# Patient Record
Sex: FEMALE | Race: WHITE | NOT HISPANIC OR LATINO | Employment: OTHER | URBAN - NONMETROPOLITAN AREA
[De-identification: names, ages, dates, MRNs, and addresses within clinical notes are randomized per-mention and may not be internally consistent; named-entity substitution may affect disease eponyms.]

---

## 2017-01-03 RX ORDER — NEBIVOLOL HYDROCHLORIDE 20 MG/1
TABLET ORAL
Qty: 90 TABLET | Refills: 1 | Status: SHIPPED | OUTPATIENT
Start: 2017-01-03 | End: 2017-05-11 | Stop reason: SDUPTHER

## 2017-01-03 RX ORDER — GLIPIZIDE 2.5 MG/1
TABLET, EXTENDED RELEASE ORAL
Qty: 90 TABLET | Refills: 1 | Status: SHIPPED | OUTPATIENT
Start: 2017-01-03 | End: 2017-05-11 | Stop reason: SDUPTHER

## 2017-01-09 RX ORDER — HYDROCHLOROTHIAZIDE 25 MG/1
25 TABLET ORAL DAILY
Qty: 90 TABLET | Refills: 1 | Status: SHIPPED | OUTPATIENT
Start: 2017-01-09 | End: 2017-05-11 | Stop reason: SDUPTHER

## 2017-01-09 RX ORDER — LOSARTAN POTASSIUM 100 MG/1
100 TABLET ORAL DAILY
Qty: 90 TABLET | Refills: 1 | Status: SHIPPED | OUTPATIENT
Start: 2017-01-09 | End: 2017-05-15 | Stop reason: SDUPTHER

## 2017-05-03 ENCOUNTER — OFFICE VISIT (OUTPATIENT)
Dept: GASTROENTEROLOGY | Facility: CLINIC | Age: 72
End: 2017-05-03

## 2017-05-03 VITALS
WEIGHT: 216 LBS | SYSTOLIC BLOOD PRESSURE: 122 MMHG | BODY MASS INDEX: 40.78 KG/M2 | HEART RATE: 88 BPM | DIASTOLIC BLOOD PRESSURE: 72 MMHG | OXYGEN SATURATION: 94 % | HEIGHT: 61 IN

## 2017-05-03 DIAGNOSIS — Z86.010 HX OF COLONIC POLYPS: Primary | ICD-10-CM

## 2017-05-03 DIAGNOSIS — E11.9 TYPE 2 DIABETES MELLITUS WITHOUT COMPLICATION, WITHOUT LONG-TERM CURRENT USE OF INSULIN (HCC): ICD-10-CM

## 2017-05-03 DIAGNOSIS — I10 ESSENTIAL HYPERTENSION: ICD-10-CM

## 2017-05-03 PROCEDURE — S0260 H&P FOR SURGERY: HCPCS | Performed by: CLINICAL NURSE SPECIALIST

## 2017-05-11 RX ORDER — GLIPIZIDE 2.5 MG/1
TABLET, EXTENDED RELEASE ORAL
Qty: 90 TABLET | Refills: 1 | Status: SHIPPED | OUTPATIENT
Start: 2017-05-11 | End: 2017-05-15 | Stop reason: SDUPTHER

## 2017-05-11 RX ORDER — NEBIVOLOL HYDROCHLORIDE 20 MG/1
TABLET ORAL
Qty: 90 TABLET | Refills: 1 | Status: SHIPPED | OUTPATIENT
Start: 2017-05-11 | End: 2017-05-15 | Stop reason: SDUPTHER

## 2017-05-11 RX ORDER — HYDROCHLOROTHIAZIDE 25 MG/1
TABLET ORAL
Qty: 90 TABLET | Refills: 1 | Status: SHIPPED | OUTPATIENT
Start: 2017-05-11 | End: 2017-05-15 | Stop reason: SDUPTHER

## 2017-05-15 ENCOUNTER — OFFICE VISIT (OUTPATIENT)
Dept: FAMILY MEDICINE CLINIC | Facility: CLINIC | Age: 72
End: 2017-05-15

## 2017-05-15 VITALS
WEIGHT: 218 LBS | HEIGHT: 61 IN | HEART RATE: 68 BPM | SYSTOLIC BLOOD PRESSURE: 150 MMHG | BODY MASS INDEX: 41.16 KG/M2 | RESPIRATION RATE: 16 BRPM | OXYGEN SATURATION: 97 % | DIASTOLIC BLOOD PRESSURE: 90 MMHG

## 2017-05-15 DIAGNOSIS — E03.9 ACQUIRED HYPOTHYROIDISM: ICD-10-CM

## 2017-05-15 DIAGNOSIS — E11.9 TYPE 2 DIABETES MELLITUS WITHOUT COMPLICATION, WITHOUT LONG-TERM CURRENT USE OF INSULIN (HCC): ICD-10-CM

## 2017-05-15 DIAGNOSIS — E78.2 MIXED HYPERLIPIDEMIA: ICD-10-CM

## 2017-05-15 DIAGNOSIS — N18.30 CKD (CHRONIC KIDNEY DISEASE) STAGE 3, GFR 30-59 ML/MIN (HCC): ICD-10-CM

## 2017-05-15 DIAGNOSIS — I10 ESSENTIAL HYPERTENSION: Primary | ICD-10-CM

## 2017-05-15 PROCEDURE — 99214 OFFICE O/P EST MOD 30 MIN: CPT | Performed by: FAMILY MEDICINE

## 2017-05-15 RX ORDER — HYDROCHLOROTHIAZIDE 25 MG/1
25 TABLET ORAL DAILY
Qty: 90 TABLET | Refills: 4 | Status: SHIPPED | OUTPATIENT
Start: 2017-05-15 | End: 2018-05-12 | Stop reason: SDUPTHER

## 2017-05-15 RX ORDER — LOSARTAN POTASSIUM 100 MG/1
100 TABLET ORAL DAILY
Qty: 90 TABLET | Refills: 4 | Status: SHIPPED | OUTPATIENT
Start: 2017-05-15 | End: 2018-05-12 | Stop reason: SDUPTHER

## 2017-05-15 RX ORDER — GLIPIZIDE 2.5 MG/1
2.5 TABLET, EXTENDED RELEASE ORAL DAILY
Qty: 90 TABLET | Refills: 4 | Status: SHIPPED | OUTPATIENT
Start: 2017-05-15 | End: 2018-05-12 | Stop reason: SDUPTHER

## 2017-05-15 RX ORDER — NEBIVOLOL 20 MG/1
20 TABLET ORAL DAILY
Qty: 90 TABLET | Refills: 4 | Status: SHIPPED | OUTPATIENT
Start: 2017-05-15 | End: 2018-05-12 | Stop reason: SDUPTHER

## 2017-05-15 RX ORDER — POLYETHYLENE GLYCOL 3350, SODIUM CHLORIDE, POTASSIUM CHLORIDE, SODIUM BICARBONATE, AND SODIUM SULFATE 240; 5.84; 2.98; 6.72; 22.72 G/4L; G/4L; G/4L; G/4L; G/4L
POWDER, FOR SOLUTION ORAL
COMMUNITY
Start: 2017-05-03 | End: 2017-11-13

## 2017-05-15 RX ORDER — LEVOTHYROXINE SODIUM 0.1 MG/1
100 TABLET ORAL DAILY
Qty: 90 TABLET | Refills: 4 | Status: SHIPPED | OUTPATIENT
Start: 2017-05-15 | End: 2018-06-07 | Stop reason: SDUPTHER

## 2017-05-16 LAB
CHOLEST SERPL-MCNC: 213 MG/DL (ref 130–200)
HBA1C MFR BLD: 5.3 %
HDLC SERPL-MCNC: 41 MG/DL
LDLC SERPL CALC-MCNC: 137 MG/DL (ref 0–99)
TRIGL SERPL-MCNC: 176 MG/DL (ref 0–149)
VLDLC SERPL CALC-MCNC: 35.2 MG/DL

## 2017-05-18 DIAGNOSIS — E78.5 HYPERLIPIDEMIA, UNSPECIFIED HYPERLIPIDEMIA TYPE: Primary | ICD-10-CM

## 2017-05-18 RX ORDER — ATORVASTATIN CALCIUM 10 MG/1
10 TABLET, FILM COATED ORAL DAILY
Qty: 30 TABLET | Refills: 4 | Status: SHIPPED | OUTPATIENT
Start: 2017-05-18 | End: 2017-08-16 | Stop reason: SDUPTHER

## 2017-07-31 ENCOUNTER — OUTSIDE FACILITY SERVICE (OUTPATIENT)
Dept: GASTROENTEROLOGY | Facility: CLINIC | Age: 72
End: 2017-07-31

## 2017-07-31 PROCEDURE — 45385 COLONOSCOPY W/LESION REMOVAL: CPT | Performed by: INTERNAL MEDICINE

## 2017-07-31 PROCEDURE — 88305 TISSUE EXAM BY PATHOLOGIST: CPT | Performed by: INTERNAL MEDICINE

## 2017-08-01 ENCOUNTER — LAB REQUISITION (OUTPATIENT)
Dept: LAB | Facility: HOSPITAL | Age: 72
End: 2017-08-01

## 2017-08-01 DIAGNOSIS — Z00.00 ENCOUNTER FOR GENERAL ADULT MEDICAL EXAMINATION WITHOUT ABNORMAL FINDINGS: ICD-10-CM

## 2017-08-02 LAB
CYTO UR: NORMAL
LAB AP CASE REPORT: NORMAL
LAB AP CLINICAL INFORMATION: NORMAL
Lab: NORMAL
PATH REPORT.FINAL DX SPEC: NORMAL
PATH REPORT.GROSS SPEC: NORMAL

## 2017-08-16 DIAGNOSIS — E78.5 HYPERLIPIDEMIA, UNSPECIFIED HYPERLIPIDEMIA TYPE: ICD-10-CM

## 2017-08-16 RX ORDER — ATORVASTATIN CALCIUM 10 MG/1
10 TABLET, FILM COATED ORAL DAILY
Qty: 90 TABLET | Refills: 1 | Status: SHIPPED | OUTPATIENT
Start: 2017-08-16 | End: 2017-12-18 | Stop reason: SDUPTHER

## 2017-09-28 ENCOUNTER — RESULTS ENCOUNTER (OUTPATIENT)
Dept: FAMILY MEDICINE CLINIC | Facility: CLINIC | Age: 72
End: 2017-09-28

## 2017-09-28 DIAGNOSIS — E78.5 HYPERLIPIDEMIA, UNSPECIFIED HYPERLIPIDEMIA TYPE: ICD-10-CM

## 2017-11-13 ENCOUNTER — OFFICE VISIT (OUTPATIENT)
Dept: FAMILY MEDICINE CLINIC | Facility: CLINIC | Age: 72
End: 2017-11-13

## 2017-11-13 VITALS
HEART RATE: 74 BPM | DIASTOLIC BLOOD PRESSURE: 80 MMHG | BODY MASS INDEX: 42.1 KG/M2 | SYSTOLIC BLOOD PRESSURE: 132 MMHG | HEIGHT: 61 IN | WEIGHT: 223 LBS | RESPIRATION RATE: 16 BRPM | OXYGEN SATURATION: 93 %

## 2017-11-13 DIAGNOSIS — E03.9 ACQUIRED HYPOTHYROIDISM: ICD-10-CM

## 2017-11-13 DIAGNOSIS — E78.2 MIXED HYPERLIPIDEMIA: ICD-10-CM

## 2017-11-13 DIAGNOSIS — E11.9 TYPE 2 DIABETES MELLITUS WITHOUT COMPLICATION, WITHOUT LONG-TERM CURRENT USE OF INSULIN (HCC): ICD-10-CM

## 2017-11-13 DIAGNOSIS — N18.30 CKD (CHRONIC KIDNEY DISEASE) STAGE 3, GFR 30-59 ML/MIN (HCC): ICD-10-CM

## 2017-11-13 DIAGNOSIS — I10 ESSENTIAL HYPERTENSION: Primary | ICD-10-CM

## 2017-11-13 DIAGNOSIS — Z23 NEED FOR VACCINATION: ICD-10-CM

## 2017-11-13 PROCEDURE — 99214 OFFICE O/P EST MOD 30 MIN: CPT | Performed by: FAMILY MEDICINE

## 2017-11-13 PROCEDURE — G0008 ADMIN INFLUENZA VIRUS VAC: HCPCS | Performed by: FAMILY MEDICINE

## 2017-11-13 PROCEDURE — 90686 IIV4 VACC NO PRSV 0.5 ML IM: CPT | Performed by: FAMILY MEDICINE

## 2017-11-13 NOTE — PROGRESS NOTES
Subjective   Cee Quiles is a 72 y.o. female.     Chief Complaint   Patient presents with   • Follow-up     6 mo       History of Present Illness     Cee is feeling good--denies any hypoglycemic [id number] lipid tx witout myalgia s--sheis toelraign synthroid wituotu heat or cold intolerances--bp is stable wituotu cp or ha  She continues to follow with [id number] for ckd    Current Outpatient Prescriptions:   •  atorvastatin (LIPITOR) 10 MG tablet, Take 1 tablet by mouth Daily., Disp: 90 tablet, Rfl: 1  •  glipiZIDE (GLUCOTROL) 2.5 MG 24 hr tablet, Take 1 tablet by mouth Daily., Disp: 90 tablet, Rfl: 4  •  hydrochlorothiazide (HYDRODIURIL) 25 MG tablet, Take 1 tablet by mouth Daily., Disp: 90 tablet, Rfl: 4  •  levothyroxine (SYNTHROID) 100 MCG tablet, Take 1 tablet by mouth Daily., Disp: 90 tablet, Rfl: 4  •  losartan (COZAAR) 100 MG tablet, Take 1 tablet by mouth Daily., Disp: 90 tablet, Rfl: 4  •  nebivolol (BYSTOLIC) 20 MG tablet, Take 1 tablet by mouth Daily., Disp: 90 tablet, Rfl: 4  Allergies   Allergen Reactions   • Keflex [Cephalexin] Anaphylaxis   • Lortab [Hydrocodone-Acetaminophen] Nausea Only       Past Medical History:   Diagnosis Date   • Arthritis    • Diabetes    • Diabetes mellitus    • Hx of colonic polyps    • Hyperlipidemia    • Hypertension    • Hypertension    • Hypothyroid    • Kidney disease      Past Surgical History:   Procedure Laterality Date   • CHOLECYSTECTOMY     • COLONOSCOPY W/ POLYPECTOMY  02/08/2012    Pedunculated polyp cecal pit, Hyperplastic polyp at 15 cm repeat exam in 5 years   • KNEE SURGERY     • OVARIAN CYST DRAINAGE         Review of Systems   Constitutional: Negative.    HENT: Negative.    Eyes: Negative.    Respiratory: Negative.    Cardiovascular: Negative.    Gastrointestinal: Negative.    Endocrine: Negative.    Genitourinary: Negative.    Musculoskeletal: Negative.    Skin: Negative.    Allergic/Immunologic: Negative.    Neurological: Negative.   "  Hematological: Negative.    Psychiatric/Behavioral: Negative.        Objective  /80  Pulse 74  Resp 16  Ht 61\" (154.9 cm)  Wt 223 lb (101 kg)  SpO2 93%  BMI 42.14 kg/m2  Physical Exam   Constitutional: She is oriented to person, place, and time. She appears well-developed and well-nourished.   HENT:   Head: Normocephalic and atraumatic.   Right Ear: External ear normal.   Left Ear: External ear normal.   Nose: Nose normal.   Mouth/Throat: Oropharynx is clear and moist.   Eyes: Conjunctivae and EOM are normal. Pupils are equal, round, and reactive to light.   Neck: Normal range of motion. Neck supple.   Cardiovascular: Normal rate, regular rhythm, normal heart sounds and intact distal pulses.    Pulmonary/Chest: Effort normal and breath sounds normal.   Abdominal: Soft. Bowel sounds are normal.   Musculoskeletal: Normal range of motion.    Cee had a diabetic foot exam performed today.    Neurological Sensory Findings - Unaltered hot/cold right ankle/foot discrimination and unaltered hot/cold left ankle/foot discrimination. Unaltered sharp/dull right ankle/foot discrimination and unaltered sharp/dull left ankle/foot discrimination. No right ankle/foot altered proprioception and no left ankle/foot altered proprioception    Vascular Status -  Her exam exhibits right foot vasculature normal. Her exam exhibits no right foot edema. Her exam exhibits left foot vasculature normal. Her exam exhibits no left foot edema.   Skin Integrity  -  Her right foot skin is intact.     Cee 's left foot skin is intact. .  Neurological: She is alert and oriented to person, place, and time. She has normal reflexes.   Skin: Skin is warm and dry.   Psychiatric: She has a normal mood and affect. Her behavior is normal. Judgment and thought content normal.   Nursing note and vitals reviewed.      Assessment/Plan   Cee was seen today for follow-up.    Diagnoses and all orders for this visit:    Essential hypertension  -    "  CBC w AUTO Differential  -     Comprehensive Metabolic Panel    Mixed hyperlipidemia  -     Lipid panel    Type 2 diabetes mellitus without complication, without long-term current use of insulin  -     Hemoglobin A1c  -     MicroAlbumin, Urine, Random - Urine, Clean Catch    Acquired hypothyroidism  -     TSH    CKD (chronic kidney disease) stage 3, GFR 30-59 ml/min    flu shot today  She says her mammo is up to date  She says her colonoscopy is up to date         Orders Placed This Encounter   Procedures   • Comprehensive Metabolic Panel   • Lipid panel   • Hemoglobin A1c   • TSH   • MicroAlbumin, Urine, Random - Urine, Clean Catch   • CBC w AUTO Differential     Order Specific Question:   Manual Differential     Answer:   No   Current outpatient and discharge medications have been reconciled for the patient.  Neville Sandoval MD    Follow up: 6 month(s)

## 2017-11-14 LAB
ALBUMIN SERPL-MCNC: 4.3 G/DL (ref 3.5–5)
ALBUMIN/GLOB SERPL: 1.5 G/DL (ref 1.1–2.5)
ALP SERPL-CCNC: 75 U/L (ref 24–120)
ALT SERPL-CCNC: 28 U/L (ref 0–54)
AST SERPL-CCNC: 19 U/L (ref 7–45)
BASOPHILS # BLD AUTO: 0.03 10*3/MM3 (ref 0–0.2)
BASOPHILS NFR BLD AUTO: 0.4 % (ref 0–2)
BILIRUB SERPL-MCNC: 0.9 MG/DL (ref 0.1–1)
BUN SERPL-MCNC: 22 MG/DL (ref 5–21)
BUN/CREAT SERPL: 18.3 (ref 7–25)
CALCIUM SERPL-MCNC: 9.8 MG/DL (ref 8.4–10.4)
CHLORIDE SERPL-SCNC: 100 MMOL/L (ref 98–110)
CHOLEST SERPL-MCNC: 138 MG/DL (ref 130–200)
CO2 SERPL-SCNC: 28 MMOL/L (ref 24–31)
CREAT SERPL-MCNC: 1.2 MG/DL (ref 0.5–1.4)
EOSINOPHIL # BLD AUTO: 0.22 10*3/MM3 (ref 0–0.7)
EOSINOPHIL NFR BLD AUTO: 3.3 % (ref 0–4)
ERYTHROCYTE [DISTWIDTH] IN BLOOD BY AUTOMATED COUNT: 13.9 % (ref 12–15)
GFR SERPLBLD CREATININE-BSD FMLA CKD-EPI: 44 ML/MIN/1.73
GFR SERPLBLD CREATININE-BSD FMLA CKD-EPI: 54 ML/MIN/1.73
GLOBULIN SER CALC-MCNC: 2.9 GM/DL
GLUCOSE SERPL-MCNC: 114 MG/DL (ref 70–100)
HBA1C MFR BLD: 5.4 %
HCT VFR BLD AUTO: 41.4 % (ref 37–47)
HDLC SERPL-MCNC: 44 MG/DL
HGB BLD-MCNC: 13.7 G/DL (ref 12–16)
IMM GRANULOCYTES # BLD: 0.03 10*3/MM3 (ref 0–0.03)
IMM GRANULOCYTES NFR BLD: 0.4 % (ref 0–5)
LDLC SERPL CALC-MCNC: 72 MG/DL (ref 0–99)
LYMPHOCYTES # BLD AUTO: 1.43 10*3/MM3 (ref 0.72–4.86)
LYMPHOCYTES NFR BLD AUTO: 21.2 % (ref 15–45)
MCH RBC QN AUTO: 28.8 PG (ref 28–32)
MCHC RBC AUTO-ENTMCNC: 33.1 G/DL (ref 33–36)
MCV RBC AUTO: 87.2 FL (ref 82–98)
MICROALBUMIN UR-MCNC: <3 UG/ML
MONOCYTES # BLD AUTO: 0.47 10*3/MM3 (ref 0.19–1.3)
MONOCYTES NFR BLD AUTO: 7 % (ref 4–12)
NEUTROPHILS # BLD AUTO: 4.55 10*3/MM3 (ref 1.87–8.4)
NEUTROPHILS NFR BLD AUTO: 67.7 % (ref 39–78)
PLATELET # BLD AUTO: 260 10*3/MM3 (ref 130–400)
POTASSIUM SERPL-SCNC: 4.1 MMOL/L (ref 3.5–5.3)
PROT SERPL-MCNC: 7.2 G/DL (ref 6.3–8.7)
RBC # BLD AUTO: 4.75 10*6/MM3 (ref 4.2–5.4)
SODIUM SERPL-SCNC: 142 MMOL/L (ref 135–145)
TRIGL SERPL-MCNC: 111 MG/DL (ref 0–149)
TSH SERPL DL<=0.005 MIU/L-ACNC: 4.82 MIU/ML (ref 0.47–4.68)
VLDLC SERPL CALC-MCNC: 22.2 MG/DL
WBC # BLD AUTO: 6.73 10*3/MM3 (ref 4.8–10.8)

## 2017-12-18 DIAGNOSIS — E78.5 HYPERLIPIDEMIA, UNSPECIFIED HYPERLIPIDEMIA TYPE: ICD-10-CM

## 2017-12-19 RX ORDER — ATORVASTATIN CALCIUM 10 MG/1
TABLET, FILM COATED ORAL
Qty: 90 TABLET | Refills: 2 | Status: SHIPPED | OUTPATIENT
Start: 2017-12-19 | End: 2018-07-26 | Stop reason: SDUPTHER

## 2018-05-15 RX ORDER — NEBIVOLOL HYDROCHLORIDE 20 MG/1
20 TABLET ORAL DAILY
Qty: 90 TABLET | Refills: 1 | Status: SHIPPED | OUTPATIENT
Start: 2018-05-15 | End: 2018-11-08 | Stop reason: SDUPTHER

## 2018-05-15 RX ORDER — LOSARTAN POTASSIUM 100 MG/1
100 TABLET ORAL DAILY
Qty: 90 TABLET | Refills: 1 | Status: SHIPPED | OUTPATIENT
Start: 2018-05-15 | End: 2018-11-08 | Stop reason: SDUPTHER

## 2018-05-15 RX ORDER — HYDROCHLOROTHIAZIDE 25 MG/1
25 TABLET ORAL DAILY
Qty: 90 TABLET | Refills: 1 | Status: SHIPPED | OUTPATIENT
Start: 2018-05-15 | End: 2018-11-08 | Stop reason: SDUPTHER

## 2018-05-15 RX ORDER — GLIPIZIDE 2.5 MG/1
2.5 TABLET, EXTENDED RELEASE ORAL DAILY
Qty: 90 TABLET | Refills: 1 | Status: SHIPPED | OUTPATIENT
Start: 2018-05-15 | End: 2018-11-08 | Stop reason: SDUPTHER

## 2018-05-18 ENCOUNTER — OFFICE VISIT (OUTPATIENT)
Dept: FAMILY MEDICINE CLINIC | Facility: CLINIC | Age: 73
End: 2018-05-18

## 2018-05-18 VITALS
HEART RATE: 72 BPM | OXYGEN SATURATION: 98 % | HEIGHT: 61 IN | DIASTOLIC BLOOD PRESSURE: 82 MMHG | BODY MASS INDEX: 42.48 KG/M2 | WEIGHT: 225 LBS | RESPIRATION RATE: 16 BRPM | SYSTOLIC BLOOD PRESSURE: 130 MMHG

## 2018-05-18 DIAGNOSIS — N18.30 CKD (CHRONIC KIDNEY DISEASE) STAGE 3, GFR 30-59 ML/MIN (HCC): ICD-10-CM

## 2018-05-18 DIAGNOSIS — I10 ESSENTIAL HYPERTENSION: Primary | ICD-10-CM

## 2018-05-18 DIAGNOSIS — E11.9 TYPE 2 DIABETES MELLITUS WITHOUT COMPLICATION, WITHOUT LONG-TERM CURRENT USE OF INSULIN (HCC): ICD-10-CM

## 2018-05-18 DIAGNOSIS — E03.9 ACQUIRED HYPOTHYROIDISM: ICD-10-CM

## 2018-05-18 DIAGNOSIS — E78.2 MIXED HYPERLIPIDEMIA: ICD-10-CM

## 2018-05-18 PROCEDURE — 99214 OFFICE O/P EST MOD 30 MIN: CPT | Performed by: FAMILY MEDICINE

## 2018-05-18 PROCEDURE — G0439 PPPS, SUBSEQ VISIT: HCPCS | Performed by: FAMILY MEDICINE

## 2018-05-18 NOTE — PROGRESS NOTES
QUICK REFERENCE INFORMATION:  The ABCs of the Annual Wellness Visit    Subsequent Medicare Wellness Visit    HEALTH RISK ASSESSMENT    1945    Recent Hospitalizations:  No hospitalization(s) within the last year..        Current Medical Providers:  Patient Care Team:  Neville Sandoval MD as PCP - General  Neville Sandoval MD as PCP - Claims Attributed        Smoking Status:  History   Smoking Status   • Never Smoker   Smokeless Tobacco   • Never Used       Alcohol Consumption:  History   Alcohol Use No       Depression Screen:   No flowsheet data found.    Health Habits and Functional and Cognitive Screening:  No flowsheet data found.        Does the patient have evidence of cognitive impairment? No    Aspirin use counseling: Does not need ASA (and currently is not on it)      Recent Lab Results:  CMP:  Lab Results   Component Value Date     (H) 11/13/2017    BUN 22 (H) 11/13/2017    CREATININE 1.20 11/13/2017    EGFRIFNONA 44 (L) 11/13/2017    EGFRIFAFRI 54 (L) 11/13/2017    BCR 18.3 11/13/2017     11/13/2017    K 4.1 11/13/2017    CO2 28.0 11/13/2017    CALCIUM 9.8 11/13/2017    PROTENTOTREF 7.2 11/13/2017    ALBUMIN 4.30 11/13/2017    LABGLOBREF 2.9 11/13/2017    LABIL2 1.5 11/13/2017    BILITOT 0.9 11/13/2017    ALKPHOS 75 11/13/2017    AST 19 11/13/2017    ALT 28 11/13/2017     Lipid Panel:  Lab Results   Component Value Date    TRIG 111 11/13/2017    HDL 44 (L) 11/13/2017    VLDL 22.2 11/13/2017     HbA1c:  Lab Results   Component Value Date    HGBA1C 5.40 11/13/2017       Visual Acuity:  No exam data present    Age-appropriate Screening Schedule:  Refer to the list below for future screening recommendations based on patient's age, sex and/or medical conditions. Orders for these recommended tests are listed in the plan section. The patient has been provided with a written plan.    Health Maintenance   Topic Date Due   • TDAP/TD VACCINES (1 - Tdap) 01/24/1966   • ZOSTER VACCINE (1 of  2) 01/24/1997   • PNEUMOCOCCAL VACCINES (65+ LOW/MEDIUM RISK) (1 of 2 - PCV13) 01/24/2012   • DIABETIC EYE EXAM  10/21/2016   • HEMOGLOBIN A1C  05/13/2018   • INFLUENZA VACCINE  08/01/2018   • DIABETIC FOOT EXAM  11/13/2018   • LIPID PANEL  11/13/2018   • URINE MICROALBUMIN  11/13/2018   • MAMMOGRAM  12/16/2018   • COLONOSCOPY  08/14/2022        Subjective   History of Present Illness    Cee Quiles is a 73 y.o. female who presents for an Subsequent Wellness Visit.    The following portions of the patient's history were reviewed and updated as appropriate: allergies, current medications, past family history, past medical history, past social history, past surgical history and problem list.    Outpatient Medications Prior to Visit   Medication Sig Dispense Refill   • atorvastatin (LIPITOR) 10 MG tablet TAKE 1 TABLET BY MOUTH  DAILY 90 tablet 2   • BYSTOLIC 20 MG tablet TAKE 1 TABLET BY MOUTH  DAILY 90 tablet 1   • glipiZIDE (GLUCOTROL XL) 2.5 MG 24 hr tablet TAKE 1 TABLET BY MOUTH  DAILY 90 tablet 1   • hydrochlorothiazide (HYDRODIURIL) 25 MG tablet TAKE 1 TABLET BY MOUTH  DAILY 90 tablet 1   • levothyroxine (SYNTHROID) 100 MCG tablet Take 1 tablet by mouth Daily. 90 tablet 4   • losartan (COZAAR) 100 MG tablet TAKE 1 TABLET BY MOUTH  DAILY 90 tablet 1     No facility-administered medications prior to visit.        Patient Active Problem List   Diagnosis   • Type 2 diabetes mellitus without complication   • Essential hypertension   • Acquired hypothyroidism   • Chronic pain of right knee   • CKD (chronic kidney disease) stage 3, GFR 30-59 ml/min   • Mixed hyperlipidemia       Advance Care Planning:  has NO advance directive - information provided to the patient today    Identification of Risk Factors:  Risk factors include: weight .    Review of Systems    Compared to one year ago, the patient feels her physical health is the same.  Compared to one year ago, the patient feels her mental health is the  "same.    Objective     Physical Exam    Vitals:    05/18/18 0904   BP: 130/82   Pulse: 72   Resp: 16   SpO2: 98%   Weight: 102 kg (225 lb)   Height: 154.9 cm (61\")       Patient's Body mass index is 42.51 kg/m². BMI is above normal parameters. Recommendations include: exercise counseling and nutrition counseling.      Assessment/Plan   Patient Self-Management and Personalized Health Advice  The patient has been provided with information about: exercise and preventive services including:   · Advance directive.    Visit Diagnoses:    ICD-10-CM ICD-9-CM   1. Essential hypertension I10 401.9   2. Mixed hyperlipidemia E78.2 272.2   3. Type 2 diabetes mellitus without complication, without long-term current use of insulin E11.9 250.00   4. Acquired hypothyroidism E03.9 244.9   5. CKD (chronic kidney disease) stage 3, GFR 30-59 ml/min N18.3 585.3       No orders of the defined types were placed in this encounter.      Outpatient Encounter Prescriptions as of 5/18/2018   Medication Sig Dispense Refill   • atorvastatin (LIPITOR) 10 MG tablet TAKE 1 TABLET BY MOUTH  DAILY 90 tablet 2   • BYSTOLIC 20 MG tablet TAKE 1 TABLET BY MOUTH  DAILY 90 tablet 1   • glipiZIDE (GLUCOTROL XL) 2.5 MG 24 hr tablet TAKE 1 TABLET BY MOUTH  DAILY 90 tablet 1   • hydrochlorothiazide (HYDRODIURIL) 25 MG tablet TAKE 1 TABLET BY MOUTH  DAILY 90 tablet 1   • levothyroxine (SYNTHROID) 100 MCG tablet Take 1 tablet by mouth Daily. 90 tablet 4   • losartan (COZAAR) 100 MG tablet TAKE 1 TABLET BY MOUTH  DAILY 90 tablet 1     No facility-administered encounter medications on file as of 5/18/2018.        Reviewed use of high risk medication in the elderly: no  Reviewed for potential of harmful drug interactions in the elderly: no    Follow Up:  No Follow-up on file.     An After Visit Summary and PPPS with all of these plans were given to the patient.         "

## 2018-05-18 NOTE — PROGRESS NOTES
Subjective   Cee Quiles is a 73 y.o. female.     Chief Complaint   Patient presents with   • Follow-up     6 mo   htn       History of Present Illness     she nots good bp control witout cp or brokos--sh eis seeing dr jara for her ckd--she toleagting synthroid wituout heat or cold intolamnces..she iis toleaing lipitiror wituout myalgias       Current Outpatient Prescriptions:   •  atorvastatin (LIPITOR) 10 MG tablet, TAKE 1 TABLET BY MOUTH  DAILY, Disp: 90 tablet, Rfl: 2  •  BYSTOLIC 20 MG tablet, TAKE 1 TABLET BY MOUTH  DAILY, Disp: 90 tablet, Rfl: 1  •  glipiZIDE (GLUCOTROL XL) 2.5 MG 24 hr tablet, TAKE 1 TABLET BY MOUTH  DAILY, Disp: 90 tablet, Rfl: 1  •  hydrochlorothiazide (HYDRODIURIL) 25 MG tablet, TAKE 1 TABLET BY MOUTH  DAILY, Disp: 90 tablet, Rfl: 1  •  levothyroxine (SYNTHROID) 100 MCG tablet, Take 1 tablet by mouth Daily., Disp: 90 tablet, Rfl: 4  •  losartan (COZAAR) 100 MG tablet, TAKE 1 TABLET BY MOUTH  DAILY, Disp: 90 tablet, Rfl: 1  Allergies   Allergen Reactions   • Keflex [Cephalexin] Anaphylaxis   • Lortab [Hydrocodone-Acetaminophen] Nausea Only       Past Medical History:   Diagnosis Date   • Arthritis    • Diabetes    • Diabetes mellitus    • Hx of colonic polyps    • Hyperlipidemia    • Hypertension    • Hypertension    • Hypothyroid    • Kidney disease      Past Surgical History:   Procedure Laterality Date   • CHOLECYSTECTOMY     • COLONOSCOPY W/ POLYPECTOMY  02/08/2012    Pedunculated polyp cecal pit, Hyperplastic polyp at 15 cm repeat exam in 5 years   • KNEE SURGERY     • OVARIAN CYST DRAINAGE         Review of Systems   Constitutional: Negative.    HENT: Negative.    Eyes: Negative.    Respiratory: Negative.    Cardiovascular: Negative.    Gastrointestinal: Negative.    Endocrine: Negative.    Genitourinary: Negative.    Musculoskeletal: Negative.    Skin: Negative.    Allergic/Immunologic: Negative.    Neurological: Negative.    Hematological: Negative.    Psychiatric/Behavioral:  Negative.        Objective   Physical Exam   Constitutional: She is oriented to person, place, and time. She appears well-developed and well-nourished.   HENT:   Head: Normocephalic and atraumatic.   Right Ear: External ear normal.   Left Ear: External ear normal.   Nose: Nose normal.   Mouth/Throat: Oropharynx is clear and moist.   Eyes: Conjunctivae and EOM are normal. Pupils are equal, round, and reactive to light.   Neck: Normal range of motion. Neck supple.   Cardiovascular: Normal rate, regular rhythm, normal heart sounds and intact distal pulses.    Pulmonary/Chest: Effort normal and breath sounds normal.   Abdominal: Soft. Bowel sounds are normal.   Musculoskeletal: Normal range of motion.    Cee had a diabetic foot exam performed today.   During the foot exam she had a monofilament test performed.    Neurological Sensory Findings - Unaltered hot/cold right ankle/foot discrimination and unaltered hot/cold left ankle/foot discrimination. Unaltered sharp/dull right ankle/foot discrimination and unaltered sharp/dull left ankle/foot discrimination. No right ankle/foot altered proprioception and no left ankle/foot altered proprioception  Vascular Status -  Her right foot exhibits abnormal foot vasculature . Her right foot exhibits no edema. Her left foot exhibits abnormal foot vasculature . Her left foot exhibits no edema.  Skin Integrity  -  Her right foot skin is not intact.  Her left foot skin is not intact..  Neurological: She is alert and oriented to person, place, and time.   Skin: Skin is warm. Capillary refill takes less than 2 seconds.   Psychiatric: She has a normal mood and affect. Her behavior is normal. Judgment and thought content normal.   Nursing note and vitals reviewed.      Assessment/Plan   Cee was seen today for follow-up.    Diagnoses and all orders for this visit:    Essential hypertension    Mixed hyperlipidemia  -     Lipid Panel w/ Chol/HDL Ratio    Type 2 diabetes mellitus without  complication, without long-term current use of insulin  -     Hemoglobin A1c  -     Lipid Panel w/ Chol/HDL Ratio  -     MicroAlbumin, Urine, Random - Urine, Clean Catch    Acquired hypothyroidism    CKD (chronic kidney disease) stage 3, GFR 30-59 ml/min                 Orders Placed This Encounter   Procedures   • Hemoglobin A1c   • Lipid Panel w/ Chol/HDL Ratio   • MicroAlbumin, Urine, Random - Urine, Clean Catch   Current outpatient and discharge medications have been reconciled for the patient.  Reviewed by: Neville Sandoval MD    Follow up: 6 month(s)

## 2018-05-19 LAB
CHOLEST SERPL-MCNC: 140 MG/DL (ref 130–200)
CHOLEST/HDLC SERPL: 3.89 {RATIO}
HBA1C MFR BLD: 5.1 %
HDLC SERPL-MCNC: 36 MG/DL
LDLC SERPL CALC-MCNC: 77 MG/DL (ref 0–99)
MICROALBUMIN UR-MCNC: <3 UG/ML
TRIGL SERPL-MCNC: 135 MG/DL (ref 0–149)
VLDLC SERPL CALC-MCNC: 27 MG/DL

## 2018-06-07 RX ORDER — LEVOTHYROXINE SODIUM 0.1 MG/1
100 TABLET ORAL DAILY
Qty: 90 TABLET | Refills: 1 | Status: SHIPPED | OUTPATIENT
Start: 2018-06-07 | End: 2018-11-08 | Stop reason: SDUPTHER

## 2018-07-26 DIAGNOSIS — E78.5 HYPERLIPIDEMIA, UNSPECIFIED HYPERLIPIDEMIA TYPE: ICD-10-CM

## 2018-07-26 RX ORDER — ATORVASTATIN CALCIUM 10 MG/1
TABLET, FILM COATED ORAL
Qty: 90 TABLET | Refills: 1 | Status: SHIPPED | OUTPATIENT
Start: 2018-07-26 | End: 2019-01-24 | Stop reason: SDUPTHER

## 2018-10-31 ENCOUNTER — FLU SHOT (OUTPATIENT)
Dept: FAMILY MEDICINE CLINIC | Facility: CLINIC | Age: 73
End: 2018-10-31

## 2018-10-31 DIAGNOSIS — E11.9 DIABETES MELLITUS WITHOUT COMPLICATION (HCC): Primary | ICD-10-CM

## 2018-10-31 LAB
CHOLEST SERPL-MCNC: 200 MG/DL (ref 130–200)
HBA1C MFR BLD: 5.1 %
HDLC SERPL-MCNC: 42 MG/DL
LDLC SERPL CALC-MCNC: 131 MG/DL (ref 0–99)
TRIGL SERPL-MCNC: 136 MG/DL (ref 0–149)
VLDLC SERPL CALC-MCNC: 27.2 MG/DL

## 2018-10-31 PROCEDURE — 90662 IIV NO PRSV INCREASED AG IM: CPT | Performed by: FAMILY MEDICINE

## 2018-10-31 PROCEDURE — G0008 ADMIN INFLUENZA VIRUS VAC: HCPCS | Performed by: FAMILY MEDICINE

## 2018-11-08 RX ORDER — NEBIVOLOL HYDROCHLORIDE 20 MG/1
20 TABLET ORAL DAILY
Qty: 90 TABLET | Refills: 3 | Status: SHIPPED | OUTPATIENT
Start: 2018-11-08 | End: 2020-01-17 | Stop reason: SDUPTHER

## 2018-11-08 RX ORDER — GLIPIZIDE 2.5 MG/1
2.5 TABLET, EXTENDED RELEASE ORAL DAILY
Qty: 90 TABLET | Refills: 3 | Status: SHIPPED | OUTPATIENT
Start: 2018-11-08 | End: 2020-01-17 | Stop reason: SDUPTHER

## 2018-11-08 RX ORDER — LEVOTHYROXINE SODIUM 0.1 MG/1
100 TABLET ORAL DAILY
Qty: 90 TABLET | Refills: 3 | Status: SHIPPED | OUTPATIENT
Start: 2018-11-08 | End: 2020-01-17 | Stop reason: SDUPTHER

## 2018-11-08 RX ORDER — LOSARTAN POTASSIUM 100 MG/1
100 TABLET ORAL DAILY
Qty: 90 TABLET | Refills: 3 | Status: SHIPPED | OUTPATIENT
Start: 2018-11-08 | End: 2019-11-18 | Stop reason: RX

## 2018-11-08 RX ORDER — HYDROCHLOROTHIAZIDE 25 MG/1
25 TABLET ORAL DAILY
Qty: 90 TABLET | Refills: 3 | Status: SHIPPED | OUTPATIENT
Start: 2018-11-08 | End: 2020-01-17 | Stop reason: DRUGHIGH

## 2018-11-16 ENCOUNTER — OFFICE VISIT (OUTPATIENT)
Dept: FAMILY MEDICINE CLINIC | Facility: CLINIC | Age: 73
End: 2018-11-16

## 2018-11-16 VITALS
RESPIRATION RATE: 16 BRPM | HEART RATE: 67 BPM | DIASTOLIC BLOOD PRESSURE: 84 MMHG | SYSTOLIC BLOOD PRESSURE: 138 MMHG | BODY MASS INDEX: 42.29 KG/M2 | OXYGEN SATURATION: 97 % | HEIGHT: 61 IN | WEIGHT: 224 LBS

## 2018-11-16 DIAGNOSIS — E03.9 ACQUIRED HYPOTHYROIDISM: ICD-10-CM

## 2018-11-16 DIAGNOSIS — E66.01 MORBIDLY OBESE (HCC): ICD-10-CM

## 2018-11-16 DIAGNOSIS — E78.2 MIXED HYPERLIPIDEMIA: ICD-10-CM

## 2018-11-16 DIAGNOSIS — E11.9 TYPE 2 DIABETES MELLITUS WITHOUT COMPLICATION, WITHOUT LONG-TERM CURRENT USE OF INSULIN (HCC): ICD-10-CM

## 2018-11-16 DIAGNOSIS — N18.30 CKD (CHRONIC KIDNEY DISEASE) STAGE 3, GFR 30-59 ML/MIN (HCC): ICD-10-CM

## 2018-11-16 DIAGNOSIS — Z12.31 SCREENING MAMMOGRAM, ENCOUNTER FOR: ICD-10-CM

## 2018-11-16 DIAGNOSIS — I10 ESSENTIAL HYPERTENSION: Primary | ICD-10-CM

## 2018-11-16 PROCEDURE — 99214 OFFICE O/P EST MOD 30 MIN: CPT | Performed by: FAMILY MEDICINE

## 2018-11-16 NOTE — PROGRESS NOTES
Subjective   Cee Quiles is a 73 y.o. female.     Chief Complaint   Patient presents with   • Follow-up     6 mo   htn       History of Present Illness     she notes good bp control witout cp or ha--bs have been stable wituout hyhpoglycemia---toleragin syntrhoid heat or coltdint=tetlace--stilll seeing nephroilogy q 6mos      Current Outpatient Medications:   •  atorvastatin (LIPITOR) 10 MG tablet, TAKE 1 TABLET BY MOUTH  DAILY, Disp: 90 tablet, Rfl: 1  •  BYSTOLIC 20 MG tablet, TAKE 1 TABLET BY MOUTH  DAILY, Disp: 90 tablet, Rfl: 3  •  glipiZIDE (GLUCOTROL XL) 2.5 MG 24 hr tablet, TAKE 1 TABLET BY MOUTH  DAILY, Disp: 90 tablet, Rfl: 3  •  hydrochlorothiazide (HYDRODIURIL) 25 MG tablet, TAKE 1 TABLET BY MOUTH  DAILY, Disp: 90 tablet, Rfl: 3  •  levothyroxine (SYNTHROID, LEVOTHROID) 100 MCG tablet, TAKE 1 TABLET BY MOUTH  DAILY, Disp: 90 tablet, Rfl: 3  •  losartan (COZAAR) 100 MG tablet, TAKE 1 TABLET BY MOUTH  DAILY, Disp: 90 tablet, Rfl: 3  Allergies   Allergen Reactions   • Keflex [Cephalexin] Anaphylaxis   • Lortab [Hydrocodone-Acetaminophen] Nausea Only       Past Medical History:   Diagnosis Date   • Arthritis    • Diabetes (CMS/HCC)    • Diabetes mellitus (CMS/HCC)    • Hx of colonic polyps    • Hyperlipidemia    • Hypertension    • Hypertension    • Hypothyroid    • Kidney disease      Past Surgical History:   Procedure Laterality Date   • CHOLECYSTECTOMY     • COLONOSCOPY W/ POLYPECTOMY  02/08/2012    Pedunculated polyp cecal pit, Hyperplastic polyp at 15 cm repeat exam in 5 years   • KNEE SURGERY     • OVARIAN CYST DRAINAGE         Review of Systems   Constitutional: Negative.    HENT: Negative.    Eyes: Negative.    Respiratory: Negative.    Cardiovascular: Negative.    Gastrointestinal: Negative.    Endocrine: Negative.    Genitourinary: Negative.    Musculoskeletal: Negative.    Skin: Negative.    Allergic/Immunologic: Negative.    Neurological: Negative.    Hematological: Negative.   "  Psychiatric/Behavioral: Negative.        Objective  /84   Pulse 67   Resp 16   Ht 154.9 cm (61\")   Wt 102 kg (224 lb)   SpO2 97%   BMI 42.32 kg/m²   Physical Exam   Constitutional: She is oriented to person, place, and time. She appears well-developed and well-nourished.   HENT:   Head: Normocephalic and atraumatic.   Right Ear: External ear normal.   Left Ear: External ear normal.   Nose: Nose normal.   Mouth/Throat: Oropharynx is clear and moist.   Eyes: Conjunctivae and EOM are normal. Pupils are equal, round, and reactive to light.   Neck: Normal range of motion. Neck supple. Thyromegaly present.   Cardiovascular: Normal rate, regular rhythm, normal heart sounds and intact distal pulses.   Pulmonary/Chest: Effort normal and breath sounds normal.   Abdominal: Soft. Bowel sounds are normal.   Musculoskeletal: Normal range of motion.   Neurological: She is alert and oriented to person, place, and time.   Skin: Skin is warm. Capillary refill takes less than 2 seconds.   Psychiatric: She has a normal mood and affect. Her behavior is normal. Judgment and thought content normal.   Nursing note and vitals reviewed.      Assessment/Plan   Cee was seen today for follow-up.    Diagnoses and all orders for this visit:    Essential hypertension    Mixed hyperlipidemia    Type 2 diabetes mellitus without complication, without long-term current use of insulin (CMS/[facility])    Acquired hypothyroidism    CKD (chronic kidney disease) stage 3, GFR 30-59 ml/min (CMS/[facility])    Morbidly obese (CMS/[facility])    Screening mammogram, encounter for  -     Mammo Screening Bilateral With CAD; Future          She is seeing kirstin tinoco her next colon       Orders Placed This Encounter   Procedures   • Mammo Screening Bilateral With CAD     Standing Status:   Future     Standing Expiration Date:   11/16/2019     Order Specific Question:   Reason for Exam:     Answer:   screening mammo       Follow up: 6 month(s)  "

## 2018-12-17 DIAGNOSIS — R92.8 ABNORMAL MAMMOGRAM: Primary | ICD-10-CM

## 2018-12-17 DIAGNOSIS — Z12.31 SCREENING MAMMOGRAM, ENCOUNTER FOR: ICD-10-CM

## 2018-12-21 DIAGNOSIS — R92.8 ABNORMAL MAMMOGRAM: Primary | ICD-10-CM

## 2018-12-21 DIAGNOSIS — E11.22 TYPE 2 DIABETES MELLITUS WITH DIABETIC CHRONIC KIDNEY DISEASE, UNSPECIFIED CKD STAGE, UNSPECIFIED WHETHER LONG TERM INSULIN USE (HCC): ICD-10-CM

## 2018-12-21 DIAGNOSIS — R92.8 ABNORMAL MAMMOGRAM: ICD-10-CM

## 2019-01-11 ENCOUNTER — TRANSCRIBE ORDERS (OUTPATIENT)
Dept: ADMINISTRATIVE | Facility: HOSPITAL | Age: 74
End: 2019-01-11

## 2019-01-11 DIAGNOSIS — N63.11 MASS OF UPPER OUTER QUADRANT OF RIGHT BREAST: ICD-10-CM

## 2019-01-11 DIAGNOSIS — N63.0 LUMP OR MASS IN BREAST: ICD-10-CM

## 2019-01-11 DIAGNOSIS — N63.10 BREAST MASS, RIGHT: ICD-10-CM

## 2019-01-11 DIAGNOSIS — N63.15 BREAST LUMP ON RIGHT SIDE AT 12 O'CLOCK POSITION: Primary | ICD-10-CM

## 2019-01-16 ENCOUNTER — HOSPITAL ENCOUNTER (OUTPATIENT)
Dept: MAMMOGRAPHY | Facility: HOSPITAL | Age: 74
Discharge: HOME | End: 2019-01-16
Attending: SPECIALIST

## 2019-01-16 ENCOUNTER — HOSPITAL ENCOUNTER (OUTPATIENT)
Dept: ULTRASOUND IMAGING | Facility: HOSPITAL | Age: 74
Discharge: HOME | End: 2019-01-16
Attending: SPECIALIST | Admitting: RADIOLOGY

## 2019-01-16 VITALS — OXYGEN SATURATION: 93 % | HEART RATE: 67 BPM | SYSTOLIC BLOOD PRESSURE: 138 MMHG | DIASTOLIC BLOOD PRESSURE: 61 MMHG

## 2019-01-16 DIAGNOSIS — N63.11 MASS OF UPPER OUTER QUADRANT OF RIGHT BREAST: ICD-10-CM

## 2019-01-16 PROCEDURE — 88342 IMHCHEM/IMCYTCHM 1ST ANTB: CPT | Performed by: SPECIALIST

## 2019-01-16 PROCEDURE — 88341 IMHCHEM/IMCYTCHM EA ADD ANTB: CPT | Performed by: SPECIALIST

## 2019-01-16 PROCEDURE — 88361 TUMOR IMMUNOHISTOCHEM/COMPUT: CPT

## 2019-01-16 PROCEDURE — 76642 ULTRASOUND BREAST LIMITED: CPT

## 2019-01-16 PROCEDURE — 88305 TISSUE EXAM BY PATHOLOGIST: CPT | Performed by: SPECIALIST

## 2019-01-16 RX ORDER — LIDOCAINE HYDROCHLORIDE AND EPINEPHRINE 10; 10 MG/ML; UG/ML
5 INJECTION, SOLUTION INFILTRATION; PERINEURAL ONCE
Status: DISCONTINUED | OUTPATIENT
Start: 2019-01-16 | End: 2019-02-13 | Stop reason: HOSPADM

## 2019-01-16 RX ORDER — LIDOCAINE HYDROCHLORIDE 10 MG/ML
5 INJECTION, SOLUTION INFILTRATION; PERINEURAL ONCE
Status: DISCONTINUED | OUTPATIENT
Start: 2019-01-16 | End: 2019-02-13 | Stop reason: HOSPADM

## 2019-01-23 ENCOUNTER — TRANSCRIBE ORDERS (OUTPATIENT)
Dept: ADMINISTRATIVE | Facility: HOSPITAL | Age: 74
End: 2019-01-23

## 2019-01-23 DIAGNOSIS — D05.01 LOBULAR CARCINOMA IN SITU (LCIS) OF BOTH BREASTS: Primary | ICD-10-CM

## 2019-01-23 DIAGNOSIS — D05.02 LOBULAR CARCINOMA IN SITU (LCIS) OF BOTH BREASTS: Primary | ICD-10-CM

## 2019-01-23 DIAGNOSIS — D05.91 CARCINOMA IN SITU OF RIGHT BREAST, UNSPECIFIED TYPE: Primary | ICD-10-CM

## 2019-01-24 DIAGNOSIS — E78.5 HYPERLIPIDEMIA, UNSPECIFIED HYPERLIPIDEMIA TYPE: ICD-10-CM

## 2019-01-24 RX ORDER — ATORVASTATIN CALCIUM 10 MG/1
TABLET, FILM COATED ORAL
Qty: 90 TABLET | Refills: 1 | Status: ON HOLD | OUTPATIENT
Start: 2019-01-24 | End: 2019-02-12

## 2019-01-30 ENCOUNTER — HOSPITAL ENCOUNTER (OUTPATIENT)
Dept: MRI IMAGING | Facility: HOSPITAL | Age: 74
Discharge: HOME | End: 2019-01-30
Attending: SPECIALIST | Admitting: SPECIALIST

## 2019-01-30 DIAGNOSIS — D05.01 LOBULAR CARCINOMA IN SITU (LCIS) OF BOTH BREASTS: ICD-10-CM

## 2019-01-30 DIAGNOSIS — D05.02 LOBULAR CARCINOMA IN SITU (LCIS) OF BOTH BREASTS: ICD-10-CM

## 2019-01-30 LAB
CREAT BLD-MCNC: 1.39 MG/DL (ref 0.5–1.4)
GFR SERPL CREATININE-BSD FRML MDRD: 37 ML/MIN/1.73

## 2019-01-30 PROCEDURE — 82565 ASSAY OF CREATININE: CPT | Performed by: SPECIALIST

## 2019-01-30 PROCEDURE — 82565 ASSAY OF CREATININE: CPT

## 2019-01-30 PROCEDURE — A9577 INJ MULTIHANCE: HCPCS | Performed by: SPECIALIST

## 2019-01-30 PROCEDURE — 0 GADOBENATE DIMEGLUMINE 529 MG/ML SOLUTION: Performed by: SPECIALIST

## 2019-01-30 PROCEDURE — C8937 CAD BREAST MRI: HCPCS

## 2019-01-30 PROCEDURE — C8908 MRI W/O FOL W/CONT, BREAST,: HCPCS

## 2019-01-30 RX ADMIN — GADOBENATE DIMEGLUMINE 20 ML: 529 INJECTION, SOLUTION INTRAVENOUS at 15:25

## 2019-01-31 LAB — CREAT BLDA-MCNC: 1.5 MG/DL (ref 0.6–1.3)

## 2019-02-01 ENCOUNTER — HOSPITAL ENCOUNTER (OUTPATIENT)
Dept: GENERAL RADIOLOGY | Facility: HOSPITAL | Age: 74
Discharge: HOME | End: 2019-02-01
Admitting: SPECIALIST

## 2019-02-01 ENCOUNTER — TRANSCRIBE ORDERS (OUTPATIENT)
Dept: ADMINISTRATIVE | Facility: HOSPITAL | Age: 74
End: 2019-02-01

## 2019-02-01 ENCOUNTER — APPOINTMENT (OUTPATIENT)
Dept: PREADMISSION TESTING | Facility: HOSPITAL | Age: 74
End: 2019-02-01

## 2019-02-01 VITALS
WEIGHT: 222.22 LBS | SYSTOLIC BLOOD PRESSURE: 153 MMHG | HEART RATE: 62 BPM | DIASTOLIC BLOOD PRESSURE: 81 MMHG | BODY MASS INDEX: 43.63 KG/M2 | RESPIRATION RATE: 16 BRPM | OXYGEN SATURATION: 96 % | HEIGHT: 60 IN

## 2019-02-01 DIAGNOSIS — R92.8 ABNORMAL MAMMOGRAM: Primary | ICD-10-CM

## 2019-02-01 LAB
ALBUMIN SERPL-MCNC: 4.5 G/DL (ref 3.5–5)
ALBUMIN/GLOB SERPL: 1.4 G/DL (ref 1.1–2.5)
ALP SERPL-CCNC: 68 U/L (ref 24–120)
ALT SERPL W P-5'-P-CCNC: <15 U/L (ref 0–54)
ANION GAP SERPL CALCULATED.3IONS-SCNC: 8 MMOL/L (ref 4–13)
AST SERPL-CCNC: 23 U/L (ref 7–45)
BASOPHILS # BLD AUTO: 0.04 10*3/MM3 (ref 0–0.2)
BASOPHILS NFR BLD AUTO: 0.6 % (ref 0–2)
BILIRUB SERPL-MCNC: 0.7 MG/DL (ref 0.1–1)
BUN BLD-MCNC: 27 MG/DL (ref 5–21)
BUN/CREAT SERPL: 19.9 (ref 7–25)
CALCIUM SPEC-SCNC: 9.3 MG/DL (ref 8.4–10.4)
CHLORIDE SERPL-SCNC: 100 MMOL/L (ref 98–110)
CO2 SERPL-SCNC: 30 MMOL/L (ref 24–31)
CREAT BLD-MCNC: 1.36 MG/DL (ref 0.5–1.4)
DEPRECATED RDW RBC AUTO: 41.5 FL (ref 40–54)
EOSINOPHIL # BLD AUTO: 0.17 10*3/MM3 (ref 0–0.7)
EOSINOPHIL NFR BLD AUTO: 2.7 % (ref 0–4)
ERYTHROCYTE [DISTWIDTH] IN BLOOD BY AUTOMATED COUNT: 13.3 % (ref 12–15)
GFR SERPL CREATININE-BSD FRML MDRD: 38 ML/MIN/1.73
GLOBULIN UR ELPH-MCNC: 3.2 GM/DL
GLUCOSE BLD-MCNC: 78 MG/DL (ref 70–100)
HCT VFR BLD AUTO: 40.3 % (ref 37–47)
HGB BLD-MCNC: 13.7 G/DL (ref 12–16)
IMM GRANULOCYTES # BLD AUTO: 0.04 10*3/MM3 (ref 0–0.03)
IMM GRANULOCYTES NFR BLD AUTO: 0.6 % (ref 0–5)
LYMPHOCYTES # BLD AUTO: 1.18 10*3/MM3 (ref 0.72–4.86)
LYMPHOCYTES NFR BLD AUTO: 18.5 % (ref 15–45)
MCH RBC QN AUTO: 29.4 PG (ref 28–32)
MCHC RBC AUTO-ENTMCNC: 34 G/DL (ref 33–36)
MCV RBC AUTO: 86.5 FL (ref 82–98)
MONOCYTES # BLD AUTO: 0.35 10*3/MM3 (ref 0.19–1.3)
MONOCYTES NFR BLD AUTO: 5.5 % (ref 4–12)
NEUTROPHILS # BLD AUTO: 4.6 10*3/MM3 (ref 1.87–8.4)
NEUTROPHILS NFR BLD AUTO: 72.1 % (ref 39–78)
NRBC BLD AUTO-RTO: 0 /100 WBC (ref 0–0)
PLATELET # BLD AUTO: 260 10*3/MM3 (ref 130–400)
PMV BLD AUTO: 9.4 FL (ref 6–12)
POTASSIUM BLD-SCNC: 3.9 MMOL/L (ref 3.5–5.3)
PROT SERPL-MCNC: 7.7 G/DL (ref 6.3–8.7)
RBC # BLD AUTO: 4.66 10*6/MM3 (ref 4.2–5.4)
SODIUM BLD-SCNC: 138 MMOL/L (ref 135–145)
WBC NRBC COR # BLD: 6.38 10*3/MM3 (ref 4.8–10.8)

## 2019-02-01 PROCEDURE — 71046 X-RAY EXAM CHEST 2 VIEWS: CPT

## 2019-02-01 PROCEDURE — 93010 ELECTROCARDIOGRAM REPORT: CPT | Performed by: INTERNAL MEDICINE

## 2019-02-01 PROCEDURE — 93005 ELECTROCARDIOGRAM TRACING: CPT

## 2019-02-01 PROCEDURE — 80053 COMPREHEN METABOLIC PANEL: CPT | Performed by: SPECIALIST

## 2019-02-01 PROCEDURE — 85025 COMPLETE CBC W/AUTO DIFF WBC: CPT | Performed by: SPECIALIST

## 2019-02-01 PROCEDURE — 36415 COLL VENOUS BLD VENIPUNCTURE: CPT

## 2019-02-01 RX ORDER — MECLIZINE HYDROCHLORIDE 25 MG/1
25 TABLET ORAL 3 TIMES DAILY PRN
COMMUNITY

## 2019-02-01 RX ORDER — ASPIRIN 325 MG
325 TABLET ORAL AS NEEDED
COMMUNITY
End: 2020-05-18

## 2019-02-01 NOTE — DISCHARGE INSTRUCTIONS
DAY OF SURGERY INSTRUCTIONS        YOUR SURGEON: DR. CHELSI HARRIS    PROCEDURE: RIGHT BREAST LUMPECTOMY WITH SENTINEL LYMPH NODE BIOPSY    DATE OF SURGERY: February 12,2019    ARRIVAL TIME: AS DIRECTED BY OFFICE    YOU MAY TAKE THE FOLLOWING MEDICATION(S) THE MORNING OF SURGERY WITH A SIP OF WATER: BYSTOLIC                MANAGING PAIN AFTER SURGERY    We know you are probably wondering what your pain will be like after surgery.  Following surgery it is unrealistic to expect you will not have pain.   Pain is how our bodies let us know that something is wrong or cautions us to be careful.  That said, our goal is to make your pain tolerable.    Methods we may use to treat your pain include (oral or IV medications, PCAs, epidurals, nerve blocks, etc.)   While some procedures require IV pain medications for a short time after surgery, transitioning to pain medications by mouth allows for better management of pain.   Your nurse will encourage you to take oral pain medications whenever possible.  IV medications work almost immediately, but only last a short while.  Taking medications by mouth allows for a more constant level of medication in your blood stream for a longer period of time.      Once your pain is out of control it is harder to get back under control.  It is important you are aware when your next dose of pain medication is due.  If you are admitted, your nurse may write the time of your next dose on the white board in your room to help you remember.      We are interested in your pain and encourage you to inform us about aggravating factors during your visit.   Many times a simple repositioning every few hours can make a big difference.    If your physician says it is okay, do not let your pain prevent you from getting out of bed. Be sure to call your nurse for assistance prior to getting up so you do not fall.      Before surgery, please decide your tolerable pain goal.  These faces help describe the pain  ratings we use on a 0-10 scale.   Be prepared to tell us your goal and whether or not you take pain or anxiety medications at home.          BEFORE YOU COME TO THE HOSPITAL  (Pre-op instructions)  • Do not eat, drink, smoke or chew gum after midnight the night before surgery.  This also includes no mints.  • Morning of surgery take only the medicines you have been instructed with a sip of water unless otherwise instructed  by your physician.  • Do not shave, wear makeup or dark nail polish.  • Remove all jewelry including rings.  • Leave anything you consider valuable at home.  • Leave your suitcase in the car until after your surgery.  • Bring the following with you if applicable:  o Picture ID and insurance, Medicare or Medicaid cards  o Co-pay/deductible required by insurance (cash, check, credit card)  o Copy of advance directive, living will or power-of- documents if not brought to PAT  o CPAP or BIPAP mask and tubing  o Relaxation aids (MP3 player, book, magazine)  • On the day of surgery check in at registration located at the main entrance of the hospital.       Outpatient Surgery Guidelines, Adult  Outpatient procedures are those for which the person having the procedure is allowed to go home the same day as the procedure. Various procedures are done on an outpatient basis. You should follow some general guidelines if you will be having an outpatient procedure.  LET YOUR HEALTH CARE PROVIDER KNOW ABOUT:  · Any allergies you have.  · All medicines you are taking, including vitamins, herbs, eye drops, creams, and over-the-counter medicines.  · Previous problems you or members of your family have had with the use of anesthetics.  · Any blood disorders you have.  · Previous surgeries you have had.  · Medical conditions you have.  RISKS AND COMPLICATIONS  Your health care provider will discuss possible risks and complications with you before surgery. Common risks and complications include:    · Problems  due to the use of anesthetics.  · Blood loss and replacement (does not apply to minor surgical procedures).  · Temporary increase in pain due to surgery.  · Uncorrected pain or problems that the surgery was meant to correct.  · Infection.  · New damage.  BEFORE THE PROCEDURE  · Ask your health care provider about changing or stopping your regular medicines. You may need to stop taking certain medicines in the days or weeks before the procedure.  · Stop smoking at least 2 weeks before surgery. This lowers your risk for complications during and after surgery. Ask your health care provider for help with this if needed.  · Eat your usual meals and a light supper the day before surgery. Continue fluid intake. Do not drink alcohol.  · Do not eat or drink after midnight the night before your surgery.   · Arrange for someone to take you home and to stay with you for 24 hours after the procedure. Medicine given for your procedure may affect your ability to drive or to care for yourself.  · Call your health care provider's office if you develop an illness or problem that may prevent you from safely having your procedure.  AFTER THE PROCEDURE  After surgery, you will be taken to a recovery area, where your progress will be monitored. If there are no complications, you will be allowed to go home when you are awake, stable, and taking fluids well. You may have numbness around the surgical site. Healing will take some time. You will have tenderness at the surgical site and may have some swelling and bruising. You may also have some nausea.  HOME CARE INSTRUCTIONS  · Do not drive for 24 hours, or as directed by your health care provider. Do not drive while taking prescription pain medicines.  · Do not drink alcohol for 24 hours.  · Do not make important decisions or sign legal documents for 24 hours.  · You may resume a normal diet and activities as directed.  · Do not lift anything heavier than 10 pounds (4.5 kg) or play contact  sports until your health care provider says it is okay.  · Change your bandages (dressings) as directed.  · Only take over-the-counter or prescription medicines as directed by your health care provider.  · Follow up with your health care provider as directed.  SEEK MEDICAL CARE IF:  · You have increased bleeding (more than a small spot) from the surgical site.  · You have redness, swelling, or increasing pain in the wound.  · You see pus coming from the wound.  · You have a fever.  · You notice a bad smell coming from the wound or dressing.  · You feel lightheaded or faint.  · You develop a rash.  · You have trouble breathing.  · You develop allergies.  MAKE SURE YOU:  · Understand these instructions.  · Will watch your condition.  · Will get help right away if you are not doing well or get worse.     This information is not intended to replace advice given to you by your health care provider. Make sure you discuss any questions you have with your health care provider.     Document Released: 09/12/2002 Document Revised: 05/03/2016 Document Reviewed: 05/22/2014  [employer] Interactive Patient Education ©2016 [employer]       Fall Prevention in Hospitals, Adult  As a hospital patient, your condition and the treatments you receive can increase your risk for falls. Some additional risk factors for falls in a hospital include:  · Being in an unfamiliar environment.  · Being on bed rest.  · Your surgery.  · Taking certain medicines.  · Your tubing requirements, such as intravenous (IV) therapy or catheters.  It is important that you learn how to decrease fall risks while at the hospital. Below are important tips that can help prevent falls.  SAFETY TIPS FOR PREVENTING FALLS  Talk about your risk of falling.  · Ask your health care provider why you are at risk for falling. Is it your medicine, illness, tubing placement, or something else?  · Make a plan with your health care provider to keep you safe from falls.  · Ask  your health care provider or pharmacist about side effects of your medicines. Some medicines can make you dizzy or affect your coordination.  Ask for help.  · Ask for help before getting out of bed. You may need to press your call button.  · Ask for assistance in getting safely to the toilet.  · Ask for a walker or cane to be put at your bedside. Ask that most of the side rails on your bed be placed up before your health care provider leaves the room.  · Ask family or friends to sit with you.  · Ask for things that are out of your reach, such as your glasses, hearing aids, telephone, bedside table, or call button.  Follow these tips to avoid falling:  · Stay lying or seated, rather than standing, while waiting for help.  · Wear rubber-soled slippers or shoes whenever you walk in the hospital.  · Avoid quick, sudden movements.  ¨ Change positions slowly.  ¨ Sit on the side of your bed before standing.  ¨ Stand up slowly and wait before you start to walk.  · Let your health care provider know if there is a spill on the floor.  · Pay careful attention to the medical equipment, electrical cords, and tubes around you.  · When you need help, use your call button by your bed or in the bathroom. Wait for one of your health care providers to help you.  · If you feel dizzy or unsure of your footing, return to bed and wait for assistance.  · Avoid being distracted by the TV, telephone, or another person in your room.  · Do not lean or support yourself on rolling objects, such as IV poles or bedside tables.     This information is not intended to replace advice given to you by your health care provider. Make sure you discuss any questions you have with your health care provider.     Document Released: 12/15/2001 Document Revised: 01/08/2016 Document Reviewed: 08/25/2013  [employer] Interactive Patient Education ©2016 [employer]       Surgical Site Infections FAQs  What is a Surgical Site Infection (SSI)?  A surgical site  infection is an infection that occurs after surgery in the part of the body where the surgery took place. Most patients who have surgery do not develop an infection. However, infections develop in about 1 to 3 out of every 100 patients who have surgery.  Some of the common symptoms of a surgical site infection are:  · Redness and pain around the area where you had surgery  · Drainage of cloudy fluid from your surgical wound  · Fever  Can SSIs be treated?  Yes. Most surgical site infections can be treated with antibiotics. The antibiotic given to you depends on the bacteria (germs) causing the infection. Sometimes patients with SSIs also need another surgery to treat the infection.  What are some of the things that hospitals are doing to prevent SSIs?  To prevent SSIs, doctors, nurses, and other healthcare providers:  · Clean their hands and arms up to their elbows with an antiseptic agent just before the surgery.  · Clean their hands with soap and water or an alcohol-based hand rub before and after caring for each patient.  · May remove some of your hair immediately before your surgery using electric clippers if the hair is in the same area where the procedure will occur. They should not shave you with a razor.  · Wear special hair covers, masks, gowns, and gloves during surgery to keep the surgery area clean.  · Give you antibiotics before your surgery starts. In most cases, you should get antibiotics within 60 minutes before the surgery starts and the antibiotics should be stopped within 24 hours after surgery.  · Clean the skin at the site of your surgery with a special soap that kills germs.  What can I do to help prevent SSIs?  Before your surgery:  · Tell your doctor about other medical problems you may have. Health problems such as allergies, diabetes, and obesity could affect your surgery and your treatment.  · Quit smoking. Patients who smoke get more infections. Talk to your doctor about how you can quit  before your surgery.  · Do not shave near where you will have surgery. Shaving with a razor can irritate your skin and make it easier to develop an infection.  At the time of your surgery:  · Speak up if someone tries to shave you with a razor before surgery. Ask why you need to be shaved and talk with your surgeon if you have any concerns.  · Ask if you will get antibiotics before surgery.  After your surgery:  · Make sure that your healthcare providers clean their hands before examining you, either with soap and water or an alcohol-based hand rub.  · If you do not see your providers clean their hands, please ask them to do so.  · Family and friends who visit you should not touch the surgical wound or dressings.  · Family and friends should clean their hands with soap and water or an alcohol-based hand rub before and after visiting you. If you do not see them clean their hands, ask them to clean their hands.  What do I need to do when I go home from the hospital?  · Before you go home, your doctor or nurse should explain everything you need to know about taking care of your wound. Make sure you understand how to care for your wound before you leave the hospital.  · Always clean your hands before and after caring for your wound.  · Before you go home, make sure you know who to contact if you have questions or problems after you get home.  · If you have any symptoms of an infection, such as redness and pain at the surgery site, drainage, or fever, call your doctor immediately.  If you have additional questions, please ask your doctor or nurse.  Developed and co-sponsored by The Society for Healthcare Epidemiology of [place] (SHEA); Infectious Diseases Society of [place] (IDSA); American Hospital Association; Association for Professionals in Infection Control and Epidemiology (APIC); Centers for Disease Control and Prevention (CDC); and The Joint Commission.     This information is not intended to replace advice given  to you by your health care provider. Make sure you discuss any questions you have with your health care provider.     Document Released: 12/23/2014 Document Revised: 01/08/2016 Document Reviewed: 03/02/2016  [employer] Interactive Patient Education ©2016 [employer]       [facility]  CHG 4% Patient Instruction Sheet    Preparing the Skin Before Surgery  Preparing or “prepping” skin before surgery can reduce the risk of infection at the surgical site. To make the process easier,[facility] has chosen 4% Chlorhexidine Gluconate (CHG) antiseptic solution.   The steps below outline the prepping process and should be carefully followed.                                                                                                                                                      Prep the skin at the following time(s):                                                      We recommend you shower the night before surgery, and again the morning of surgery with the 4% CHG antiseptic solution using half of the bottle and a cloth each time.  Dress in clean clothes/sleepwear after showering.  See instructions below for application.          Do not apply any lotions or moisturizers.       Do not shave the area to be prepped for at least 2 days prior to surgery.    Clipping the hair may be done immediately prior to your surgery at the hospital    if needed.    Directions:  Thoroughly rinse your body with water.  Apply 4% CHG to a cloth and wash skin gently, paying special attention to the operative site.  Rinse again thoroughly.  Once you have begun using this product do not apply anything else to your skin. If itching or redness persists, rinse affected areas and discontinue use.    When using this product:  • Keep out of eyes, ears, and mouth.  • If solution should contact these areas, rinse out promptly and thoroughly with water.  • For external use only.  • Do not use in genital area, on your face or  head.      PATIENT/FAMILY/RESPONSIBLE PARTY VERBALIZES UNDERSTANDING OF ABOVE EDUCATION.  COPY OF PAIN SCALE GIVEN AND REVIEWED WITH VERBALIZED UNDERSTANDING.

## 2019-02-06 ENCOUNTER — APPOINTMENT (OUTPATIENT)
Dept: MAMMOGRAPHY | Facility: HOSPITAL | Age: 74
End: 2019-02-06
Attending: SPECIALIST

## 2019-02-06 ENCOUNTER — APPOINTMENT (OUTPATIENT)
Dept: ULTRASOUND IMAGING | Facility: HOSPITAL | Age: 74
End: 2019-02-06
Attending: SPECIALIST

## 2019-02-06 ENCOUNTER — APPOINTMENT (OUTPATIENT)
Dept: NUCLEAR MEDICINE | Facility: HOSPITAL | Age: 74
End: 2019-02-06
Attending: SPECIALIST

## 2019-02-07 ENCOUNTER — HOSPITAL ENCOUNTER (OUTPATIENT)
Dept: ULTRASOUND IMAGING | Facility: HOSPITAL | Age: 74
End: 2019-02-07
Attending: SPECIALIST

## 2019-02-07 ENCOUNTER — HOSPITAL ENCOUNTER (OUTPATIENT)
Dept: ULTRASOUND IMAGING | Facility: HOSPITAL | Age: 74
Discharge: HOME | End: 2019-02-07
Attending: SPECIALIST | Admitting: SPECIALIST

## 2019-02-07 ENCOUNTER — HOSPITAL ENCOUNTER (OUTPATIENT)
Dept: MAMMOGRAPHY | Facility: HOSPITAL | Age: 74
Discharge: HOME | End: 2019-02-07
Attending: SPECIALIST

## 2019-02-07 DIAGNOSIS — R92.8 ABNORMAL MAMMOGRAM: ICD-10-CM

## 2019-02-07 PROCEDURE — 76642 ULTRASOUND BREAST LIMITED: CPT

## 2019-02-07 PROCEDURE — 88305 TISSUE EXAM BY PATHOLOGIST: CPT | Performed by: SPECIALIST

## 2019-02-07 RX ORDER — LIDOCAINE HYDROCHLORIDE AND EPINEPHRINE 10; 10 MG/ML; UG/ML
5 INJECTION, SOLUTION INFILTRATION; PERINEURAL ONCE
Status: DISCONTINUED | OUTPATIENT
Start: 2019-02-07 | End: 2019-02-13 | Stop reason: HOSPADM

## 2019-02-07 RX ORDER — LIDOCAINE HYDROCHLORIDE 10 MG/ML
5 INJECTION, SOLUTION INFILTRATION; PERINEURAL ONCE
Status: DISCONTINUED | OUTPATIENT
Start: 2019-02-07 | End: 2019-02-13 | Stop reason: HOSPADM

## 2019-02-08 LAB
CYTO UR: NORMAL
LAB AP CASE REPORT: NORMAL
LAB AP CLINICAL INFORMATION: NORMAL
LAB AP INTRADEPARTMENTAL CONSULT: NORMAL
PATH REPORT.FINAL DX SPEC: NORMAL
PATH REPORT.GROSS SPEC: NORMAL

## 2019-02-11 ENCOUNTER — TRANSCRIBE ORDERS (OUTPATIENT)
Dept: ADMINISTRATIVE | Facility: HOSPITAL | Age: 74
End: 2019-02-11

## 2019-02-11 DIAGNOSIS — D05.91 CARCINOMA IN SITU OF RIGHT BREAST, UNSPECIFIED TYPE: Primary | ICD-10-CM

## 2019-02-12 ENCOUNTER — HOSPITAL ENCOUNTER (OUTPATIENT)
Dept: NUCLEAR MEDICINE | Facility: HOSPITAL | Age: 74
Discharge: HOME | End: 2019-02-12
Attending: SPECIALIST

## 2019-02-12 ENCOUNTER — ANESTHESIA EVENT (OUTPATIENT)
Dept: PERIOP | Facility: HOSPITAL | Age: 74
End: 2019-02-12

## 2019-02-12 ENCOUNTER — ANESTHESIA (OUTPATIENT)
Dept: PERIOP | Facility: HOSPITAL | Age: 74
End: 2019-02-12

## 2019-02-12 ENCOUNTER — HOSPITAL ENCOUNTER (OUTPATIENT)
Facility: HOSPITAL | Age: 74
Discharge: HOME | End: 2019-02-13
Attending: SPECIALIST | Admitting: SPECIALIST

## 2019-02-12 DIAGNOSIS — D05.91 CARCINOMA IN SITU OF RIGHT BREAST, UNSPECIFIED TYPE: ICD-10-CM

## 2019-02-12 DIAGNOSIS — C50.919 BREAST CA (HCC): ICD-10-CM

## 2019-02-12 DIAGNOSIS — Z74.09 IMPAIRED FUNCTIONAL MOBILITY AND ACTIVITY TOLERANCE: ICD-10-CM

## 2019-02-12 LAB
GLUCOSE BLDC GLUCOMTR-MCNC: 123 MG/DL (ref 70–130)
GLUCOSE BLDC GLUCOMTR-MCNC: 183 MG/DL (ref 70–130)

## 2019-02-12 PROCEDURE — 25010000002 DEXAMETHASONE PER 1 MG: Performed by: NURSE ANESTHETIST, CERTIFIED REGISTERED

## 2019-02-12 PROCEDURE — A9270 NON-COVERED ITEM OR SERVICE: HCPCS | Performed by: SPECIALIST

## 2019-02-12 PROCEDURE — 63710000001 METHYLCELLULOSE (LAXATIVE) 500 MG TABLET: Performed by: SPECIALIST

## 2019-02-12 PROCEDURE — 25010000002 FENTANYL CITRATE (PF) 250 MCG/5ML SOLUTION: Performed by: NURSE ANESTHETIST, CERTIFIED REGISTERED

## 2019-02-12 PROCEDURE — G0378 HOSPITAL OBSERVATION PER HR: HCPCS

## 2019-02-12 PROCEDURE — 25010000002 ONDANSETRON PER 1 MG: Performed by: NURSE ANESTHETIST, CERTIFIED REGISTERED

## 2019-02-12 PROCEDURE — 25010000002 VANCOMYCIN PER 500 MG: Performed by: SPECIALIST

## 2019-02-12 PROCEDURE — 63710000001 SUCRALFATE 1 GM/10ML SUSPENSION: Performed by: SPECIALIST

## 2019-02-12 PROCEDURE — 63710000001 LEVOTHYROXINE 100 MCG TABLET: Performed by: SPECIALIST

## 2019-02-12 PROCEDURE — A9270 NON-COVERED ITEM OR SERVICE: HCPCS | Performed by: ANESTHESIOLOGY

## 2019-02-12 PROCEDURE — 63710000001 ATORVASTATIN 10 MG TABLET: Performed by: SPECIALIST

## 2019-02-12 PROCEDURE — 25010000002 DEXAMETHASONE PER 1 MG: Performed by: ANESTHESIOLOGY

## 2019-02-12 PROCEDURE — 63710000001 OXYCODONE-ACETAMINOPHEN 10-325 MG TABLET: Performed by: ANESTHESIOLOGY

## 2019-02-12 PROCEDURE — 78195 LYMPH SYSTEM IMAGING: CPT

## 2019-02-12 PROCEDURE — A9541 TC99M SULFUR COLLOID: HCPCS | Performed by: SPECIALIST

## 2019-02-12 PROCEDURE — 0 TECHNETIUM FILTERED SULFUR COLLOID: Performed by: SPECIALIST

## 2019-02-12 PROCEDURE — 82962 GLUCOSE BLOOD TEST: CPT

## 2019-02-12 PROCEDURE — 88307 TISSUE EXAM BY PATHOLOGIST: CPT | Performed by: SPECIALIST

## 2019-02-12 PROCEDURE — 63710000001 HYDROCHLOROTHIAZIDE 25 MG TABLET: Performed by: SPECIALIST

## 2019-02-12 PROCEDURE — 63710000001 FAMOTIDINE 20 MG TABLET: Performed by: SPECIALIST

## 2019-02-12 PROCEDURE — 25010000002 PROPOFOL 1000 MG/ML EMULSION: Performed by: NURSE ANESTHETIST, CERTIFIED REGISTERED

## 2019-02-12 PROCEDURE — 63710000001 OXYCODONE-ACETAMINOPHEN 5-325 MG TABLET: Performed by: SPECIALIST

## 2019-02-12 PROCEDURE — 25010000002 PROPOFOL 10 MG/ML EMULSION: Performed by: NURSE ANESTHETIST, CERTIFIED REGISTERED

## 2019-02-12 PROCEDURE — 25010000002 VANCOMYCIN 1 G RECONSTITUTED SOLUTION: Performed by: SPECIALIST

## 2019-02-12 RX ORDER — MAGNESIUM HYDROXIDE 1200 MG/15ML
LIQUID ORAL AS NEEDED
Status: DISCONTINUED | OUTPATIENT
Start: 2019-02-12 | End: 2019-02-12 | Stop reason: HOSPADM

## 2019-02-12 RX ORDER — FENTANYL CITRATE 50 UG/ML
INJECTION, SOLUTION INTRAMUSCULAR; INTRAVENOUS AS NEEDED
Status: DISCONTINUED | OUTPATIENT
Start: 2019-02-12 | End: 2019-02-12 | Stop reason: SURG

## 2019-02-12 RX ORDER — FENTANYL CITRATE 50 UG/ML
25 INJECTION, SOLUTION INTRAMUSCULAR; INTRAVENOUS AS NEEDED
Status: DISCONTINUED | OUTPATIENT
Start: 2019-02-12 | End: 2019-02-12

## 2019-02-12 RX ORDER — OXYCODONE AND ACETAMINOPHEN 10; 325 MG/1; MG/1
1 TABLET ORAL ONCE AS NEEDED
Status: COMPLETED | OUTPATIENT
Start: 2019-02-12 | End: 2019-02-12

## 2019-02-12 RX ORDER — ROCURONIUM BROMIDE 10 MG/ML
INJECTION, SOLUTION INTRAVENOUS AS NEEDED
Status: DISCONTINUED | OUTPATIENT
Start: 2019-02-12 | End: 2019-02-12 | Stop reason: SURG

## 2019-02-12 RX ORDER — OXYCODONE HYDROCHLORIDE AND ACETAMINOPHEN 5; 325 MG/1; MG/1
1 TABLET ORAL EVERY 4 HOURS PRN
Status: DISCONTINUED | OUTPATIENT
Start: 2019-02-12 | End: 2019-02-14 | Stop reason: HOSPADM

## 2019-02-12 RX ORDER — SIMETHICONE 80 MG
80 TABLET,CHEWABLE ORAL 4 TIMES DAILY PRN
Status: DISCONTINUED | OUTPATIENT
Start: 2019-02-12 | End: 2019-02-14 | Stop reason: HOSPADM

## 2019-02-12 RX ORDER — MECLIZINE HYDROCHLORIDE 25 MG/1
25 TABLET ORAL DAILY PRN
Status: DISCONTINUED | OUTPATIENT
Start: 2019-02-12 | End: 2019-02-14 | Stop reason: HOSPADM

## 2019-02-12 RX ORDER — SODIUM CHLORIDE, SODIUM LACTATE, POTASSIUM CHLORIDE, CALCIUM CHLORIDE 600; 310; 30; 20 MG/100ML; MG/100ML; MG/100ML; MG/100ML
1000 INJECTION, SOLUTION INTRAVENOUS CONTINUOUS
Status: DISCONTINUED | OUTPATIENT
Start: 2019-02-12 | End: 2019-02-12

## 2019-02-12 RX ORDER — FAMOTIDINE 10 MG/ML
20 INJECTION, SOLUTION INTRAVENOUS
Status: DISCONTINUED | OUTPATIENT
Start: 2019-02-12 | End: 2019-02-12 | Stop reason: HOSPADM

## 2019-02-12 RX ORDER — LIDOCAINE AND PRILOCAINE 25; 25 MG/G; MG/G
CREAM TOPICAL ONCE
Status: COMPLETED | OUTPATIENT
Start: 2019-02-12 | End: 2019-02-12

## 2019-02-12 RX ORDER — PHENYLEPHRINE HCL IN 0.9% NACL 0.8MG/10ML
SYRINGE (ML) INTRAVENOUS AS NEEDED
Status: DISCONTINUED | OUTPATIENT
Start: 2019-02-12 | End: 2019-02-12 | Stop reason: SURG

## 2019-02-12 RX ORDER — ONDANSETRON 2 MG/ML
INJECTION INTRAMUSCULAR; INTRAVENOUS AS NEEDED
Status: DISCONTINUED | OUTPATIENT
Start: 2019-02-12 | End: 2019-02-12 | Stop reason: SURG

## 2019-02-12 RX ORDER — SODIUM CHLORIDE 0.9 % (FLUSH) 0.9 %
3 SYRINGE (ML) INJECTION EVERY 12 HOURS SCHEDULED
Status: DISCONTINUED | OUTPATIENT
Start: 2019-02-12 | End: 2019-02-12 | Stop reason: HOSPADM

## 2019-02-12 RX ORDER — MEPERIDINE HYDROCHLORIDE 25 MG/ML
12.5 INJECTION INTRAMUSCULAR; INTRAVENOUS; SUBCUTANEOUS
Status: DISCONTINUED | OUTPATIENT
Start: 2019-02-12 | End: 2019-02-12

## 2019-02-12 RX ORDER — METOCLOPRAMIDE HYDROCHLORIDE 5 MG/ML
5 INJECTION INTRAMUSCULAR; INTRAVENOUS
Status: DISCONTINUED | OUTPATIENT
Start: 2019-02-12 | End: 2019-02-12

## 2019-02-12 RX ORDER — ALBUTEROL SULFATE 2.5 MG/3ML
2.5 SOLUTION RESPIRATORY (INHALATION) EVERY 6 HOURS PRN
Status: DISCONTINUED | OUTPATIENT
Start: 2019-02-12 | End: 2019-02-14 | Stop reason: HOSPADM

## 2019-02-12 RX ORDER — ATORVASTATIN CALCIUM 10 MG/1
10 TABLET, FILM COATED ORAL DAILY
Status: DISCONTINUED | OUTPATIENT
Start: 2019-02-12 | End: 2019-02-14 | Stop reason: HOSPADM

## 2019-02-12 RX ORDER — LABETALOL HYDROCHLORIDE 5 MG/ML
5 INJECTION, SOLUTION INTRAVENOUS
Status: DISCONTINUED | OUTPATIENT
Start: 2019-02-12 | End: 2019-02-12

## 2019-02-12 RX ORDER — SODIUM CHLORIDE 0.9 % (FLUSH) 0.9 %
1-10 SYRINGE (ML) INJECTION AS NEEDED
Status: DISCONTINUED | OUTPATIENT
Start: 2019-02-12 | End: 2019-02-12 | Stop reason: HOSPADM

## 2019-02-12 RX ORDER — NEBIVOLOL 2.5 MG/1
20 TABLET ORAL
Status: DISCONTINUED | OUTPATIENT
Start: 2019-02-13 | End: 2019-02-14 | Stop reason: HOSPADM

## 2019-02-12 RX ORDER — DEXAMETHASONE SODIUM PHOSPHATE 4 MG/ML
4 INJECTION, SOLUTION INTRA-ARTICULAR; INTRALESIONAL; INTRAMUSCULAR; INTRAVENOUS; SOFT TISSUE ONCE AS NEEDED
Status: COMPLETED | OUTPATIENT
Start: 2019-02-12 | End: 2019-02-12

## 2019-02-12 RX ORDER — VANCOMYCIN HYDROCHLORIDE 1 G/200ML
1000 INJECTION, SOLUTION INTRAVENOUS EVERY 12 HOURS
Status: COMPLETED | OUTPATIENT
Start: 2019-02-12 | End: 2019-02-13

## 2019-02-12 RX ORDER — FLUMAZENIL 0.1 MG/ML
0.2 INJECTION INTRAVENOUS AS NEEDED
Status: DISCONTINUED | OUTPATIENT
Start: 2019-02-12 | End: 2019-02-12

## 2019-02-12 RX ORDER — ACETAMINOPHEN 500 MG
1000 TABLET ORAL ONCE
Status: COMPLETED | OUTPATIENT
Start: 2019-02-12 | End: 2019-02-12

## 2019-02-12 RX ORDER — HYDROCHLOROTHIAZIDE 25 MG/1
25 TABLET ORAL DAILY
Status: DISCONTINUED | OUTPATIENT
Start: 2019-02-12 | End: 2019-02-14 | Stop reason: HOSPADM

## 2019-02-12 RX ORDER — KETAMINE HYDROCHLORIDE 50 MG/ML
INJECTION, SOLUTION, CONCENTRATE INTRAMUSCULAR; INTRAVENOUS AS NEEDED
Status: DISCONTINUED | OUTPATIENT
Start: 2019-02-12 | End: 2019-02-12 | Stop reason: SURG

## 2019-02-12 RX ORDER — DEXAMETHASONE SODIUM PHOSPHATE 4 MG/ML
INJECTION, SOLUTION INTRA-ARTICULAR; INTRALESIONAL; INTRAMUSCULAR; INTRAVENOUS; SOFT TISSUE AS NEEDED
Status: DISCONTINUED | OUTPATIENT
Start: 2019-02-12 | End: 2019-02-12 | Stop reason: SURG

## 2019-02-12 RX ORDER — PROPOFOL 10 MG/ML
VIAL (ML) INTRAVENOUS AS NEEDED
Status: DISCONTINUED | OUTPATIENT
Start: 2019-02-12 | End: 2019-02-12 | Stop reason: SURG

## 2019-02-12 RX ORDER — GLIPIZIDE 5 MG/1
1.25 TABLET ORAL
Status: DISCONTINUED | OUTPATIENT
Start: 2019-02-12 | End: 2019-02-14 | Stop reason: HOSPADM

## 2019-02-12 RX ORDER — ONDANSETRON 2 MG/ML
4 INJECTION INTRAMUSCULAR; INTRAVENOUS AS NEEDED
Status: DISCONTINUED | OUTPATIENT
Start: 2019-02-12 | End: 2019-02-12

## 2019-02-12 RX ORDER — LEVOTHYROXINE SODIUM 0.1 MG/1
100 TABLET ORAL
Status: DISCONTINUED | OUTPATIENT
Start: 2019-02-12 | End: 2019-02-14 | Stop reason: HOSPADM

## 2019-02-12 RX ORDER — ONDANSETRON 8 MG/1
8 TABLET, ORALLY DISINTEGRATING ORAL EVERY 6 HOURS PRN
Status: DISCONTINUED | OUTPATIENT
Start: 2019-02-12 | End: 2019-02-14 | Stop reason: HOSPADM

## 2019-02-12 RX ORDER — HYDRALAZINE HYDROCHLORIDE 20 MG/ML
5 INJECTION INTRAMUSCULAR; INTRAVENOUS
Status: DISCONTINUED | OUTPATIENT
Start: 2019-02-12 | End: 2019-02-12

## 2019-02-12 RX ORDER — FENTANYL CITRATE 50 UG/ML
INJECTION, SOLUTION INTRAMUSCULAR; INTRAVENOUS AS NEEDED
Status: DISCONTINUED | OUTPATIENT
Start: 2019-02-12 | End: 2019-02-12

## 2019-02-12 RX ORDER — SODIUM CHLORIDE 0.9 % (FLUSH) 0.9 %
3 SYRINGE (ML) INJECTION AS NEEDED
Status: DISCONTINUED | OUTPATIENT
Start: 2019-02-12 | End: 2019-02-12 | Stop reason: HOSPADM

## 2019-02-12 RX ORDER — SUCRALFATE ORAL 1 G/10ML
1 SUSPENSION ORAL EVERY 6 HOURS SCHEDULED
Status: DISCONTINUED | OUTPATIENT
Start: 2019-02-12 | End: 2019-02-14 | Stop reason: HOSPADM

## 2019-02-12 RX ORDER — IPRATROPIUM BROMIDE AND ALBUTEROL SULFATE 2.5; .5 MG/3ML; MG/3ML
3 SOLUTION RESPIRATORY (INHALATION) ONCE AS NEEDED
Status: DISCONTINUED | OUTPATIENT
Start: 2019-02-12 | End: 2019-02-12

## 2019-02-12 RX ORDER — LORAZEPAM 1 MG/1
1 TABLET ORAL EVERY 6 HOURS PRN
Status: DISCONTINUED | OUTPATIENT
Start: 2019-02-12 | End: 2019-02-14 | Stop reason: HOSPADM

## 2019-02-12 RX ORDER — ATORVASTATIN CALCIUM 10 MG/1
10 TABLET, FILM COATED ORAL DAILY
COMMUNITY
End: 2019-06-28 | Stop reason: SDUPTHER

## 2019-02-12 RX ORDER — MORPHINE SULFATE 2 MG/ML
2 INJECTION, SOLUTION INTRAMUSCULAR; INTRAVENOUS
Status: DISCONTINUED | OUTPATIENT
Start: 2019-02-12 | End: 2019-02-12

## 2019-02-12 RX ORDER — SODIUM CHLORIDE, SODIUM LACTATE, POTASSIUM CHLORIDE, CALCIUM CHLORIDE 600; 310; 30; 20 MG/100ML; MG/100ML; MG/100ML; MG/100ML
100 INJECTION, SOLUTION INTRAVENOUS CONTINUOUS
Status: DISCONTINUED | OUTPATIENT
Start: 2019-02-12 | End: 2019-02-12

## 2019-02-12 RX ORDER — NALOXONE HCL 0.4 MG/ML
0.04 VIAL (ML) INJECTION AS NEEDED
Status: DISCONTINUED | OUTPATIENT
Start: 2019-02-12 | End: 2019-02-12

## 2019-02-12 RX ORDER — SODIUM CHLORIDE AND POTASSIUM CHLORIDE 150; 450 MG/100ML; MG/100ML
75 INJECTION, SOLUTION INTRAVENOUS CONTINUOUS
Status: DISCONTINUED | OUTPATIENT
Start: 2019-02-12 | End: 2019-02-14 | Stop reason: HOSPADM

## 2019-02-12 RX ORDER — FAMOTIDINE 20 MG/1
20 TABLET, FILM COATED ORAL 2 TIMES DAILY
Status: DISCONTINUED | OUTPATIENT
Start: 2019-02-12 | End: 2019-02-14 | Stop reason: HOSPADM

## 2019-02-12 RX ADMIN — EPHEDRINE SULFATE 15 MG: 50 INJECTION INTRAMUSCULAR; INTRAVENOUS; SUBCUTANEOUS at 11:55

## 2019-02-12 RX ADMIN — POTASSIUM CHLORIDE AND SODIUM CHLORIDE 75 ML/HR: 450; 150 INJECTION, SOLUTION INTRAVENOUS at 16:03

## 2019-02-12 RX ADMIN — HYDROCHLOROTHIAZIDE 25 MG: 25 TABLET ORAL at 16:03

## 2019-02-12 RX ADMIN — DEXAMETHASONE SODIUM PHOSPHATE 4 MG: 4 INJECTION, SOLUTION INTRA-ARTICULAR; INTRALESIONAL; INTRAMUSCULAR; INTRAVENOUS; SOFT TISSUE at 09:03

## 2019-02-12 RX ADMIN — TECHNETIUM TC 99M SULFUR COLLOID 1 DOSE: KIT at 07:45

## 2019-02-12 RX ADMIN — SODIUM CHLORIDE, POTASSIUM CHLORIDE, SODIUM LACTATE AND CALCIUM CHLORIDE: 600; 310; 30; 20 INJECTION, SOLUTION INTRAVENOUS at 13:27

## 2019-02-12 RX ADMIN — PROPOFOL: 10 INJECTION, EMULSION INTRAVENOUS at 13:00

## 2019-02-12 RX ADMIN — PROPOFOL 100 MCG/KG/MIN: 10 INJECTION, EMULSION INTRAVENOUS at 10:00

## 2019-02-12 RX ADMIN — FAMOTIDINE 20 MG: 20 TABLET, FILM COATED ORAL at 21:07

## 2019-02-12 RX ADMIN — PROPOFOL 150 MG: 10 INJECTION, EMULSION INTRAVENOUS at 09:57

## 2019-02-12 RX ADMIN — Medication 80 MCG: at 11:15

## 2019-02-12 RX ADMIN — FENTANYL CITRATE 50 MCG: 50 INJECTION INTRAMUSCULAR; INTRAVENOUS at 09:57

## 2019-02-12 RX ADMIN — VANCOMYCIN HYDROCHLORIDE 1000 MG: 1 INJECTION, SOLUTION INTRAVENOUS at 21:07

## 2019-02-12 RX ADMIN — Medication 80 MCG: at 11:33

## 2019-02-12 RX ADMIN — OXYCODONE HYDROCHLORIDE AND ACETAMINOPHEN 1 TABLET: 5; 325 TABLET ORAL at 18:56

## 2019-02-12 RX ADMIN — OXYCODONE HYDROCHLORIDE AND ACETAMINOPHEN 1 TABLET: 10; 325 TABLET ORAL at 14:56

## 2019-02-12 RX ADMIN — SUCRALFATE 1 G: 1 SUSPENSION ORAL at 23:50

## 2019-02-12 RX ADMIN — ATORVASTATIN CALCIUM 10 MG: 10 TABLET, FILM COATED ORAL at 16:03

## 2019-02-12 RX ADMIN — ROCURONIUM BROMIDE 35 MG: 10 INJECTION INTRAVENOUS at 09:57

## 2019-02-12 RX ADMIN — FENTANYL CITRATE 50 MCG: 50 INJECTION INTRAMUSCULAR; INTRAVENOUS at 13:31

## 2019-02-12 RX ADMIN — ACETAMINOPHEN 1000 MG: 500 TABLET, FILM COATED ORAL at 09:03

## 2019-02-12 RX ADMIN — SUCRALFATE 1 G: 1 SUSPENSION ORAL at 16:03

## 2019-02-12 RX ADMIN — KETAMINE HYDROCHLORIDE 200 MG: 50 INJECTION, SOLUTION INTRAMUSCULAR; INTRAVENOUS at 10:00

## 2019-02-12 RX ADMIN — METHYLCELLULOSE 1000 MG: 500 TABLET ORAL at 21:08

## 2019-02-12 RX ADMIN — FENTANYL CITRATE 50 MCG: 50 INJECTION INTRAMUSCULAR; INTRAVENOUS at 10:45

## 2019-02-12 RX ADMIN — Medication 80 MCG: at 10:15

## 2019-02-12 RX ADMIN — Medication 80 MCG: at 11:30

## 2019-02-12 RX ADMIN — LIDOCAINE AND PRILOCAINE: 25; 25 CREAM TOPICAL at 06:45

## 2019-02-12 RX ADMIN — VANCOMYCIN HYDROCHLORIDE 1000 MG: 1 INJECTION, POWDER, LYOPHILIZED, FOR SOLUTION INTRAVENOUS at 09:16

## 2019-02-12 RX ADMIN — DEXAMETHASONE SODIUM PHOSPHATE 4 MG: 4 INJECTION, SOLUTION INTRAMUSCULAR; INTRAVENOUS at 13:27

## 2019-02-12 RX ADMIN — FENTANYL CITRATE 50 MCG: 50 INJECTION INTRAMUSCULAR; INTRAVENOUS at 10:20

## 2019-02-12 RX ADMIN — ONDANSETRON HYDROCHLORIDE 4 MG: 2 SOLUTION INTRAMUSCULAR; INTRAVENOUS at 13:27

## 2019-02-12 RX ADMIN — SODIUM CHLORIDE, POTASSIUM CHLORIDE, SODIUM LACTATE AND CALCIUM CHLORIDE 1000 ML: 600; 310; 30; 20 INJECTION, SOLUTION INTRAVENOUS at 06:24

## 2019-02-12 RX ADMIN — LEVOTHYROXINE SODIUM 100 MCG: 100 TABLET ORAL at 16:03

## 2019-02-12 RX ADMIN — FENTANYL CITRATE 50 MCG: 50 INJECTION INTRAMUSCULAR; INTRAVENOUS at 12:05

## 2019-02-12 RX ADMIN — FAMOTIDINE 20 MG: 10 INJECTION, SOLUTION INTRAVENOUS at 09:04

## 2019-02-12 NOTE — ANESTHESIA PREPROCEDURE EVALUATION
Anesthesia Evaluation     Patient summary reviewed and Nursing notes reviewed   history of anesthetic complications: PONV  NPO Solid Status: > 8 hours  NPO Liquid Status: > 8 hours           Airway   Mallampati: II  TM distance: >3 FB  Neck ROM: full  No difficulty expected  Dental      Pulmonary     breath sounds clear to auscultation  (-) asthma, sleep apnea, not a smoker  Cardiovascular   Exercise tolerance: good (4-7 METS) (Limited by knees, no soa or chest pain)    ECG reviewed  Patient on routine beta blocker and Beta blocker given within 24 hours of surgery  Rhythm: regular  Rate: normal    (+) hypertension, hyperlipidemia,       Neuro/Psych  (+) dizziness/light headedness,     (-) seizures, TIA, headaches  GI/Hepatic/Renal/Endo    (+) morbid obesity,  renal disease CRI, diabetes mellitus, hypothyroidism,   (-) GERD, liver disease    Musculoskeletal     Abdominal    Substance History      OB/GYN          Other   (+) arthritis   history of cancer                    Anesthesia Plan    ASA 3     general   total IV anesthesia(Pt with severe PONV, plan for TIVA )  intravenous induction   Anesthetic plan, all risks, benefits, and alternatives have been provided, discussed and informed consent has been obtained with: patient.

## 2019-02-12 NOTE — ANESTHESIA PROCEDURE NOTES
Airway  Urgency: elective    Airway not difficult    General Information and Staff    Patient location during procedure: OR  CRNA: Norris Morton CRNA    Indications and Patient Condition  Indications for airway management: airway protection    Preoxygenated: yes  MILS maintained throughout  Mask difficulty assessment: 1 - vent by mask    Final Airway Details  Final airway type: endotracheal airway      Successful airway: ETT  Cuffed: yes   Successful intubation technique: direct laryngoscopy  Facilitating devices/methods: intubating stylet  Endotracheal tube insertion site: oral  Blade: Vivian  Blade size: 3  ETT size (mm): 7.0  Cormack-Lehane Classification: grade I - full view of glottis  Placement verified by: chest auscultation and capnometry   Cuff volume (mL): 8  Measured from: lips  ETT to lips (cm): 21  Number of attempts at approach: 1

## 2019-02-12 NOTE — ANESTHESIA POSTPROCEDURE EVALUATION
Patient: Cee Quiles    Procedure Summary     Date:  02/12/19 Room / Location:   [facility] OR 04 /  [facility]    Anesthesia Start:  0954 Anesthesia Stop:      Procedure:  BILATERAL MASTECTOMY WITH RIGHT BREAST SENTINEL LYMPH NODE BIOPSY - RADIOLOGIST WILL INJECT (Bilateral Breast) Diagnosis:  (RIGHT BREAST CANCER)    Surgeon:  Michael Hodges MD Provider:  Diana Bloom CRNA    Anesthesia Type:  general ASA Status:  3          Anesthesia Type: general  Last vitals  BP   130/64 (02/12/19 1530)   Temp   97.5 °F (36.4 °C) (02/12/19 1530)   Pulse   74 (02/12/19 1530)   Resp   16 (02/12/19 1530)     SpO2   97 % (02/12/19 1530)     Post Anesthesia Care and Evaluation    Patient location during evaluation: PACU  Patient participation: complete - patient participated  Level of consciousness: awake and alert  Pain management: adequate  Airway patency: patent  Anesthetic complications: No anesthetic complications  PONV Status: none  Cardiovascular status: acceptable and hemodynamically stable  Respiratory status: acceptable  Hydration status: acceptable    Comments: Blood pressure 130/64, pulse 74, temperature 97.5 °F (36.4 °C), temperature source Oral, resp. rate 16, SpO2 97 %, not currently breastfeeding.    Patient discharged from PACU based upon Memo score. Please see RN notes for further details

## 2019-02-13 VITALS
HEART RATE: 66 BPM | TEMPERATURE: 98.3 F | SYSTOLIC BLOOD PRESSURE: 111 MMHG | WEIGHT: 222.22 LBS | OXYGEN SATURATION: 93 % | HEIGHT: 60 IN | RESPIRATION RATE: 16 BRPM | DIASTOLIC BLOOD PRESSURE: 56 MMHG | BODY MASS INDEX: 43.63 KG/M2

## 2019-02-13 LAB
ALBUMIN SERPL-MCNC: 4 G/DL (ref 3.5–5)
ALBUMIN/GLOB SERPL: 1.5 G/DL (ref 1.1–2.5)
ALP SERPL-CCNC: 49 U/L (ref 24–120)
ALT SERPL W P-5'-P-CCNC: 28 U/L (ref 0–54)
ANION GAP SERPL CALCULATED.3IONS-SCNC: 10 MMOL/L (ref 4–13)
AST SERPL-CCNC: 23 U/L (ref 7–45)
BASOPHILS # BLD AUTO: 0.01 10*3/MM3 (ref 0–0.2)
BASOPHILS NFR BLD AUTO: 0.1 % (ref 0–2)
BILIRUB SERPL-MCNC: 0.9 MG/DL (ref 0.1–1)
BUN BLD-MCNC: 20 MG/DL (ref 5–21)
BUN/CREAT SERPL: 17.4 (ref 7–25)
CALCIUM SPEC-SCNC: 8.9 MG/DL (ref 8.4–10.4)
CHLORIDE SERPL-SCNC: 95 MMOL/L (ref 98–110)
CO2 SERPL-SCNC: 28 MMOL/L (ref 24–31)
CREAT BLD-MCNC: 1.15 MG/DL (ref 0.5–1.4)
DEPRECATED RDW RBC AUTO: 41.6 FL (ref 40–54)
EOSINOPHIL # BLD AUTO: 0 10*3/MM3 (ref 0–0.7)
EOSINOPHIL NFR BLD AUTO: 0 % (ref 0–4)
ERYTHROCYTE [DISTWIDTH] IN BLOOD BY AUTOMATED COUNT: 13.4 % (ref 12–15)
GFR SERPL CREATININE-BSD FRML MDRD: 46 ML/MIN/1.73
GLOBULIN UR ELPH-MCNC: 2.6 GM/DL
GLUCOSE BLD-MCNC: 151 MG/DL (ref 70–100)
HCT VFR BLD AUTO: 36.8 % (ref 37–47)
HGB BLD-MCNC: 12.2 G/DL (ref 12–16)
IMM GRANULOCYTES # BLD AUTO: 0.04 10*3/MM3 (ref 0–0.05)
IMM GRANULOCYTES NFR BLD AUTO: 0.4 % (ref 0–5)
LYMPHOCYTES # BLD AUTO: 0.48 10*3/MM3 (ref 0.72–4.86)
LYMPHOCYTES NFR BLD AUTO: 4.5 % (ref 15–45)
MCH RBC QN AUTO: 28.6 PG (ref 28–32)
MCHC RBC AUTO-ENTMCNC: 33.2 G/DL (ref 33–36)
MCV RBC AUTO: 86.4 FL (ref 82–98)
MONOCYTES # BLD AUTO: 0.62 10*3/MM3 (ref 0.19–1.3)
MONOCYTES NFR BLD AUTO: 5.8 % (ref 4–12)
NEUTROPHILS # BLD AUTO: 9.46 10*3/MM3 (ref 1.87–8.4)
NEUTROPHILS NFR BLD AUTO: 89.2 % (ref 39–78)
NRBC BLD AUTO-RTO: 0 /100 WBC (ref 0–0)
PLATELET # BLD AUTO: 211 10*3/MM3 (ref 130–400)
PMV BLD AUTO: 9.2 FL (ref 6–12)
POTASSIUM BLD-SCNC: 4.2 MMOL/L (ref 3.5–5.3)
PROT SERPL-MCNC: 6.6 G/DL (ref 6.3–8.7)
RBC # BLD AUTO: 4.26 10*6/MM3 (ref 4.2–5.4)
SODIUM BLD-SCNC: 133 MMOL/L (ref 135–145)
WBC NRBC COR # BLD: 10.61 10*3/MM3 (ref 4.8–10.8)

## 2019-02-13 PROCEDURE — 63710000001 FAMOTIDINE 20 MG TABLET: Performed by: SPECIALIST

## 2019-02-13 PROCEDURE — 63710000001 METHYLCELLULOSE (LAXATIVE) 500 MG TABLET: Performed by: SPECIALIST

## 2019-02-13 PROCEDURE — 63710000001 ONDANSETRON PER 8 MG: Performed by: SPECIALIST

## 2019-02-13 PROCEDURE — A9270 NON-COVERED ITEM OR SERVICE: HCPCS | Performed by: SPECIALIST

## 2019-02-13 PROCEDURE — 63710000001 NEBIVOLOL 5 MG TABLET: Performed by: SPECIALIST

## 2019-02-13 PROCEDURE — 25010000002 PROMETHAZINE PER 50 MG: Performed by: SPECIALIST

## 2019-02-13 PROCEDURE — 63710000001 ATORVASTATIN 10 MG TABLET: Performed by: SPECIALIST

## 2019-02-13 PROCEDURE — 63710000001 HYDROCHLOROTHIAZIDE 25 MG TABLET: Performed by: SPECIALIST

## 2019-02-13 PROCEDURE — 25010000002 ENOXAPARIN PER 10 MG: Performed by: SPECIALIST

## 2019-02-13 PROCEDURE — 80053 COMPREHEN METABOLIC PANEL: CPT | Performed by: SPECIALIST

## 2019-02-13 PROCEDURE — 63710000001 LEVOTHYROXINE 100 MCG TABLET: Performed by: SPECIALIST

## 2019-02-13 PROCEDURE — 97150 GROUP THERAPEUTIC PROCEDURES: CPT

## 2019-02-13 PROCEDURE — 97161 PT EVAL LOW COMPLEX 20 MIN: CPT

## 2019-02-13 PROCEDURE — 97110 THERAPEUTIC EXERCISES: CPT

## 2019-02-13 PROCEDURE — 63710000001 SUCRALFATE 1 GM/10ML SUSPENSION: Performed by: SPECIALIST

## 2019-02-13 PROCEDURE — 63710000001 GLIPIZIDE 5 MG TABLET: Performed by: SPECIALIST

## 2019-02-13 PROCEDURE — 25010000002 VANCOMYCIN PER 500 MG: Performed by: SPECIALIST

## 2019-02-13 PROCEDURE — G0378 HOSPITAL OBSERVATION PER HR: HCPCS

## 2019-02-13 PROCEDURE — 85025 COMPLETE CBC W/AUTO DIFF WBC: CPT | Performed by: SPECIALIST

## 2019-02-13 RX ORDER — METHYLCELLULOSE 2 G/19G
2 POWDER, FOR SOLUTION ORAL DAILY
Qty: 60 G | Refills: 6 | Status: SHIPPED | OUTPATIENT
Start: 2019-02-13 | End: 2019-03-15

## 2019-02-13 RX ORDER — PROMETHAZINE HYDROCHLORIDE 25 MG/ML
12.5 INJECTION, SOLUTION INTRAMUSCULAR; INTRAVENOUS EVERY 6 HOURS PRN
Status: DISCONTINUED | OUTPATIENT
Start: 2019-02-13 | End: 2019-02-14 | Stop reason: HOSPADM

## 2019-02-13 RX ORDER — HYDROCODONE BITARTRATE AND ACETAMINOPHEN 7.5; 325 MG/1; MG/1
1 TABLET ORAL EVERY 4 HOURS PRN
Qty: 40 TABLET | Refills: 0 | Status: SHIPPED | OUTPATIENT
Start: 2019-02-13 | End: 2019-11-18

## 2019-02-13 RX ORDER — SULFAMETHOXAZOLE AND TRIMETHOPRIM 800; 160 MG/1; MG/1
1 TABLET ORAL DAILY
Qty: 28 TABLET | Refills: 0 | Status: SHIPPED | OUTPATIENT
Start: 2019-02-13 | End: 2019-04-03

## 2019-02-13 RX ORDER — ONDANSETRON HCL 8 MG
8 TABLET ORAL EVERY 8 HOURS PRN
Qty: 10 TABLET | Refills: 1 | Status: SHIPPED | OUTPATIENT
Start: 2019-02-13 | End: 2019-11-18

## 2019-02-13 RX ADMIN — PROMETHAZINE HYDROCHLORIDE 12.5 MG: 25 INJECTION INTRAMUSCULAR; INTRAVENOUS at 05:12

## 2019-02-13 RX ADMIN — GLIPIZIDE 1.25 MG: 5 TABLET ORAL at 18:16

## 2019-02-13 RX ADMIN — HYDROCHLOROTHIAZIDE 25 MG: 25 TABLET ORAL at 09:34

## 2019-02-13 RX ADMIN — SUCRALFATE 1 G: 1 SUSPENSION ORAL at 05:50

## 2019-02-13 RX ADMIN — VANCOMYCIN HYDROCHLORIDE 1000 MG: 1 INJECTION, SOLUTION INTRAVENOUS at 08:30

## 2019-02-13 RX ADMIN — ONDANSETRON 8 MG: 8 TABLET, ORALLY DISINTEGRATING ORAL at 04:19

## 2019-02-13 RX ADMIN — ATORVASTATIN CALCIUM 10 MG: 10 TABLET, FILM COATED ORAL at 08:30

## 2019-02-13 RX ADMIN — VANCOMYCIN HYDROCHLORIDE 1000 MG: 1 INJECTION, SOLUTION INTRAVENOUS at 21:07

## 2019-02-13 RX ADMIN — POTASSIUM CHLORIDE AND SODIUM CHLORIDE 75 ML/HR: 450; 150 INJECTION, SOLUTION INTRAVENOUS at 11:04

## 2019-02-13 RX ADMIN — LEVOTHYROXINE SODIUM 100 MCG: 100 TABLET ORAL at 05:50

## 2019-02-13 RX ADMIN — SUCRALFATE 1 G: 1 SUSPENSION ORAL at 18:16

## 2019-02-13 RX ADMIN — SUCRALFATE 1 G: 1 SUSPENSION ORAL at 11:45

## 2019-02-13 RX ADMIN — ENOXAPARIN SODIUM 30 MG: 30 INJECTION SUBCUTANEOUS at 15:54

## 2019-02-13 RX ADMIN — METHYLCELLULOSE 1000 MG: 500 TABLET ORAL at 21:09

## 2019-02-13 RX ADMIN — NEBIVOLOL HYDROCHLORIDE 20 MG: 5 TABLET ORAL at 05:50

## 2019-02-13 RX ADMIN — FAMOTIDINE 20 MG: 20 TABLET, FILM COATED ORAL at 21:07

## 2019-02-13 RX ADMIN — FAMOTIDINE 20 MG: 20 TABLET, FILM COATED ORAL at 08:30

## 2019-02-13 RX ADMIN — GLIPIZIDE 1.25 MG: 5 TABLET ORAL at 08:30

## 2019-02-15 ENCOUNTER — TRANSITIONAL CARE MANAGEMENT TELEPHONE ENCOUNTER (OUTPATIENT)
Dept: FAMILY MEDICINE CLINIC | Facility: CLINIC | Age: 74
End: 2019-02-15

## 2019-02-15 ENCOUNTER — TELEPHONE (OUTPATIENT)
Dept: FAMILY MEDICINE CLINIC | Facility: CLINIC | Age: 74
End: 2019-02-15

## 2019-02-15 LAB
CYTO UR: NORMAL
LAB AP CASE REPORT: NORMAL
LAB AP CLINICAL INFORMATION: NORMAL
LAB AP SYNOPTIC CHECKLIST: NORMAL
PATH REPORT.FINAL DX SPEC: NORMAL
PATH REPORT.GROSS SPEC: NORMAL

## 2019-02-15 NOTE — TELEPHONE ENCOUNTER
Please put in for an appt 02-22-19 at 9am hospital folloow up her  has appt somewere else that day so this  Time is the time that works

## 2019-02-22 ENCOUNTER — OFFICE VISIT (OUTPATIENT)
Dept: FAMILY MEDICINE CLINIC | Facility: CLINIC | Age: 74
End: 2019-02-22

## 2019-02-22 VITALS
HEIGHT: 61 IN | DIASTOLIC BLOOD PRESSURE: 78 MMHG | OXYGEN SATURATION: 97 % | BODY MASS INDEX: 40.59 KG/M2 | WEIGHT: 215 LBS | RESPIRATION RATE: 16 BRPM | HEART RATE: 68 BPM | SYSTOLIC BLOOD PRESSURE: 120 MMHG

## 2019-02-22 DIAGNOSIS — C50.919 MALIGNANT NEOPLASM OF FEMALE BREAST, UNSPECIFIED ESTROGEN RECEPTOR STATUS, UNSPECIFIED LATERALITY, UNSPECIFIED SITE OF BREAST (HCC): Primary | ICD-10-CM

## 2019-02-22 PROCEDURE — 99213 OFFICE O/P EST LOW 20 MIN: CPT | Performed by: FAMILY MEDICINE

## 2019-02-22 NOTE — PROGRESS NOTES
Subjective   Cee Quiles is a 74 y.o. female.     Chief Complaint   Patient presents with   • Follow-up     double mastectomy surgery        History of Present Illness     she has been dealing with breast cancer with dr patel---she has another appt next week--she says she might not have to have rad or chemo therapy      Current Outpatient Medications:   •  aspirin 325 MG tablet, Take 325 mg by mouth Daily., Disp: , Rfl:   •  atorvastatin (LIPITOR) 10 MG tablet, Take 10 mg by mouth Daily., Disp: , Rfl:   •  BYSTOLIC 20 MG tablet, TAKE 1 TABLET BY MOUTH  DAILY, Disp: 90 tablet, Rfl: 3  •  CITRUCEL oral powder, Take 2 application by mouth Daily for 30 doses., Disp: 60 g, Rfl: 6  •  glipiZIDE (GLUCOTROL XL) 2.5 MG 24 hr tablet, TAKE 1 TABLET BY MOUTH  DAILY, Disp: 90 tablet, Rfl: 3  •  hydrochlorothiazide (HYDRODIURIL) 25 MG tablet, TAKE 1 TABLET BY MOUTH  DAILY, Disp: 90 tablet, Rfl: 3  •  HYDROcodone-acetaminophen (NORCO) 7.5-325 MG per tablet, Take 1 tablet by mouth Every 4 (Four) Hours As Needed for Moderate Pain  for up to 40 doses., Disp: 40 tablet, Rfl: 0  •  levothyroxine (SYNTHROID, LEVOTHROID) 100 MCG tablet, TAKE 1 TABLET BY MOUTH  DAILY, Disp: 90 tablet, Rfl: 3  •  losartan (COZAAR) 100 MG tablet, TAKE 1 TABLET BY MOUTH  DAILY, Disp: 90 tablet, Rfl: 3  •  meclizine (ANTIVERT) 25 MG tablet, Take 25 mg by mouth 3 (Three) Times a Day As Needed for dizziness., Disp: , Rfl:   •  sulfamethoxazole-trimethoprim (BACTRIM DS) 800-160 MG per tablet, Take 1 tablet by mouth Daily., Disp: 28 tablet, Rfl: 0  •  ZOFRAN 8 MG tablet, Take 1 tablet by mouth Every 8 (Eight) Hours As Needed for Nausea or Vomiting for up to 10 doses., Disp: 10 tablet, Rfl: 1  Allergies   Allergen Reactions   • Keflex [Cephalexin] Anaphylaxis   • Lortab [Hydrocodone-Acetaminophen] Nausea Only       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, left     WRIST   • Cancer (CMS/HCC)      "LOBULAR RIGHT BREAST   • Chronic knee pain     BILATERAL   • Colon polyp    • Cyst of ovary, left    • Diabetes (CMS/HCC)    • Diabetes mellitus (CMS/HCC)    • Dizziness    • Elevated cholesterol    • Gallstones    • Hx of colonic polyps    • Hyperlipidemia    • Hypertension    • Hypertension    • Hypothyroid    • Kidney disease    • PONV (postoperative nausea and vomiting)    • Torn meniscus      Past Surgical History:   Procedure Laterality Date   • CHOLECYSTECTOMY     • COLONOSCOPY W/ POLYPECTOMY  02/08/2012    Pedunculated polyp cecal pit, Hyperplastic polyp at 15 cm repeat exam in 5 years   • KNEE SURGERY Right    • MASTECTOMY W/ SENTINEL NODE BIOPSY Bilateral 2/12/2019    Procedure: BILATERAL MASTECTOMY WITH RIGHT BREAST SENTINEL LYMPH NODE BIOPSY - RADIOLOGIST WILL INJECT;  Surgeon: Michael Hodges MD;  Location: [facility] OR;  Service: General   • OVARIAN CYST DRAINAGE Left    • TUMOR REMOVAL Left        Review of Systems   Constitutional: Negative.    HENT: Negative.    Eyes: Negative.    Respiratory: Negative.    Cardiovascular: Negative.    Gastrointestinal: Negative.    Endocrine: Negative.    Genitourinary: Negative.    Musculoskeletal: Negative.    Skin: Negative.    Neurological: Negative.    Hematological: Negative.    Psychiatric/Behavioral: Negative.        Objective  /78   Pulse 68   Resp 16   Ht 154.9 cm (61\")   Wt 97.5 kg (215 lb)   SpO2 97%   BMI 40.62 kg/m²   Physical Exam   Constitutional: She is oriented to person, place, and time. She appears well-developed and well-nourished.   HENT:   Head: Normocephalic and atraumatic.   Right Ear: External ear normal.   Left Ear: External ear normal.   Nose: Nose normal.   Mouth/Throat: Oropharynx is clear and moist.   Eyes: Conjunctivae and EOM are normal. Pupils are equal, round, and reactive to light.   Neck: Normal range of motion. Neck supple.   Cardiovascular: Normal rate, regular rhythm, normal heart sounds and intact distal pulses. "   Pulmonary/Chest: Effort normal and breath sounds normal.   Abdominal: Soft. Bowel sounds are normal.   Musculoskeletal: Normal range of motion.   Neurological: She is alert and oriented to person, place, and time.   Skin: Skin is warm.   Psychiatric: She has a normal mood and affect. Her behavior is normal. Judgment and thought content normal.   Vitals reviewed.      Assessment/Plan   Cee was seen today for follow-up.    Diagnoses and all orders for this visit:    Malignant neoplasm of female breast, unspecified estrogen receptor status, unspecified laterality, unspecified site of breast (CMS/HCC)    Patient's Body mass index is 40.62 kg/m². BMI is above normal parameters. Recommendations include: no follow-up required.             No orders of the defined types were placed in this encounter.    Current outpatient and discharge medications have been reconciled for the patient.  Reviewed by: Neville Sandoval MD  Follow up: 3 month(s)

## 2019-03-07 ENCOUNTER — LAB REQUISITION (OUTPATIENT)
Dept: LAB | Facility: HOSPITAL | Age: 74
End: 2019-03-07

## 2019-03-07 DIAGNOSIS — Z00.00 ENCOUNTER FOR GENERAL ADULT MEDICAL EXAMINATION WITHOUT ABNORMAL FINDINGS: ICD-10-CM

## 2019-03-07 LAB
ALBUMIN SERPL-MCNC: 4.1 G/DL (ref 3.5–5)
ALBUMIN/GLOB SERPL: 1.3 G/DL (ref 1.1–2.5)
ALP SERPL-CCNC: 75 U/L (ref 24–120)
ALT SERPL W P-5'-P-CCNC: 25 U/L (ref 0–54)
ANION GAP SERPL CALCULATED.3IONS-SCNC: 13 MMOL/L (ref 4–13)
AST SERPL-CCNC: 17 U/L (ref 7–45)
BILIRUB SERPL-MCNC: 0.6 MG/DL (ref 0.1–1)
BUN BLD-MCNC: 30 MG/DL (ref 5–21)
BUN/CREAT SERPL: 17.6 (ref 7–25)
CALCIUM SPEC-SCNC: 9.7 MG/DL (ref 8.4–10.4)
CEA SERPL-MCNC: 1.08 NG/ML (ref 0–5)
CHLORIDE SERPL-SCNC: 98 MMOL/L (ref 98–110)
CO2 SERPL-SCNC: 23 MMOL/L (ref 24–31)
CREAT BLD-MCNC: 1.7 MG/DL (ref 0.5–1.4)
GFR SERPL CREATININE-BSD FRML MDRD: 30 ML/MIN/1.73
GLOBULIN UR ELPH-MCNC: 3.1 GM/DL
GLUCOSE BLD-MCNC: 116 MG/DL (ref 70–100)
POTASSIUM BLD-SCNC: 5 MMOL/L (ref 3.5–5.3)
PROT SERPL-MCNC: 7.2 G/DL (ref 6.3–8.7)
SODIUM BLD-SCNC: 134 MMOL/L (ref 135–145)

## 2019-03-07 PROCEDURE — 80053 COMPREHEN METABOLIC PANEL: CPT | Performed by: INTERNAL MEDICINE

## 2019-03-07 PROCEDURE — 86300 IMMUNOASSAY TUMOR CA 15-3: CPT | Performed by: INTERNAL MEDICINE

## 2019-03-07 PROCEDURE — 82378 CARCINOEMBRYONIC ANTIGEN: CPT | Performed by: INTERNAL MEDICINE

## 2019-03-08 LAB — CANCER AG27-29 SERPL-ACNC: 24.2 U/ML (ref 0–38.6)

## 2019-03-22 ENCOUNTER — TRANSCRIBE ORDERS (OUTPATIENT)
Dept: ONCOLOGY | Facility: CLINIC | Age: 74
End: 2019-03-22

## 2019-03-22 ENCOUNTER — TRANSCRIBE ORDERS (OUTPATIENT)
Dept: ADMINISTRATIVE | Facility: HOSPITAL | Age: 74
End: 2019-03-22

## 2019-03-22 DIAGNOSIS — C50.811 MALIGNANT NEOPLASM OF OVERLAPPING SITES OF RIGHT FEMALE BREAST, UNSPECIFIED ESTROGEN RECEPTOR STATUS (HCC): Primary | ICD-10-CM

## 2019-03-22 DIAGNOSIS — Z78.0 POSTMENOPAUSAL: Primary | ICD-10-CM

## 2019-03-22 LAB
CYTO UR: NORMAL
LAB AP CASE REPORT: NORMAL
LAB AP CLINICAL INFORMATION: NORMAL
Lab: NORMAL
PATH REPORT.ADDENDUM SPEC: NORMAL
PATH REPORT.FINAL DX SPEC: NORMAL
PATH REPORT.GROSS SPEC: NORMAL

## 2019-03-27 ENCOUNTER — APPOINTMENT (OUTPATIENT)
Dept: BONE DENSITY | Facility: HOSPITAL | Age: 74
End: 2019-03-27

## 2019-03-29 ENCOUNTER — HOSPITAL ENCOUNTER (OUTPATIENT)
Dept: BONE DENSITY | Facility: HOSPITAL | Age: 74
Discharge: HOME | End: 2019-03-29
Admitting: INTERNAL MEDICINE

## 2019-03-29 DIAGNOSIS — Z78.0 POSTMENOPAUSAL: ICD-10-CM

## 2019-03-29 PROCEDURE — 77080 DXA BONE DENSITY AXIAL: CPT

## 2019-04-01 PROBLEM — Z78.0 POSTMENOPAUSAL: Status: ACTIVE | Noted: 2019-04-01

## 2019-04-03 ENCOUNTER — APPOINTMENT (OUTPATIENT)
Dept: LAB | Facility: HOSPITAL | Age: 74
End: 2019-04-03

## 2019-04-03 ENCOUNTER — INFUSION (OUTPATIENT)
Dept: ONCOLOGY | Facility: HOSPITAL | Age: 74
End: 2019-04-03

## 2019-04-03 VITALS
WEIGHT: 219 LBS | DIASTOLIC BLOOD PRESSURE: 65 MMHG | TEMPERATURE: 97.5 F | BODY MASS INDEX: 41.35 KG/M2 | SYSTOLIC BLOOD PRESSURE: 134 MMHG | HEART RATE: 70 BPM | OXYGEN SATURATION: 97 % | RESPIRATION RATE: 18 BRPM | HEIGHT: 61 IN

## 2019-04-03 DIAGNOSIS — Z78.0 POSTMENOPAUSAL: Primary | ICD-10-CM

## 2019-04-03 LAB
ALBUMIN SERPL-MCNC: 4.2 G/DL (ref 3.5–5)
ALBUMIN/GLOB SERPL: 1.4 G/DL (ref 1.1–2.5)
ALP SERPL-CCNC: 106 U/L (ref 24–120)
ALT SERPL W P-5'-P-CCNC: <15 U/L (ref 0–54)
ANION GAP SERPL CALCULATED.3IONS-SCNC: 12 MMOL/L (ref 4–13)
AST SERPL-CCNC: 21 U/L (ref 7–45)
BASOPHILS # BLD AUTO: 0.04 10*3/MM3 (ref 0–0.2)
BASOPHILS NFR BLD AUTO: 0.5 % (ref 0–2)
BILIRUB SERPL-MCNC: 0.6 MG/DL (ref 0.1–1)
BUN BLD-MCNC: 26 MG/DL (ref 5–21)
BUN/CREAT SERPL: 20 (ref 7–25)
CALCIUM SPEC-SCNC: 9.4 MG/DL (ref 8.4–10.4)
CEA SERPL-MCNC: 1.37 NG/ML (ref 0–5)
CHLORIDE SERPL-SCNC: 100 MMOL/L (ref 98–110)
CO2 SERPL-SCNC: 28 MMOL/L (ref 24–31)
CREAT BLD-MCNC: 1.3 MG/DL (ref 0.5–1.4)
DEPRECATED RDW RBC AUTO: 43.1 FL (ref 40–54)
EOSINOPHIL # BLD AUTO: 0 10*3/MM3 (ref 0–0.7)
EOSINOPHIL NFR BLD AUTO: 0 % (ref 0–4)
ERYTHROCYTE [DISTWIDTH] IN BLOOD BY AUTOMATED COUNT: 13.8 % (ref 12–15)
GFR SERPL CREATININE-BSD FRML MDRD: 40 ML/MIN/1.73
GLOBULIN UR ELPH-MCNC: 3.1 GM/DL
GLUCOSE BLD-MCNC: 102 MG/DL (ref 70–100)
HCT VFR BLD AUTO: 36.8 % (ref 37–47)
HGB BLD-MCNC: 12.8 G/DL (ref 12–16)
IMM GRANULOCYTES # BLD AUTO: 0.07 10*3/MM3 (ref 0–0.05)
IMM GRANULOCYTES NFR BLD AUTO: 1 % (ref 0–5)
LYMPHOCYTES # BLD AUTO: 1.33 10*3/MM3 (ref 0.72–4.86)
LYMPHOCYTES NFR BLD AUTO: 18.1 % (ref 15–45)
MCH RBC QN AUTO: 29.8 PG (ref 28–32)
MCHC RBC AUTO-ENTMCNC: 34.8 G/DL (ref 33–36)
MCV RBC AUTO: 85.6 FL (ref 82–98)
MONOCYTES # BLD AUTO: 0.51 10*3/MM3 (ref 0.19–1.3)
MONOCYTES NFR BLD AUTO: 7 % (ref 4–12)
NEUTROPHILS # BLD AUTO: 5.38 10*3/MM3 (ref 1.87–8.4)
NEUTROPHILS NFR BLD AUTO: 73.4 % (ref 39–78)
NRBC BLD AUTO-RTO: 0 /100 WBC (ref 0–0)
PLATELET # BLD AUTO: 263 10*3/MM3 (ref 130–400)
PMV BLD AUTO: 9 FL (ref 6–12)
POTASSIUM BLD-SCNC: 3.9 MMOL/L (ref 3.5–5.3)
PROT SERPL-MCNC: 7.3 G/DL (ref 6.3–8.7)
RBC # BLD AUTO: 4.3 10*6/MM3 (ref 4.2–5.4)
SODIUM BLD-SCNC: 140 MMOL/L (ref 135–145)
WBC NRBC COR # BLD: 7.33 10*3/MM3 (ref 4.8–10.8)

## 2019-04-03 PROCEDURE — 36415 COLL VENOUS BLD VENIPUNCTURE: CPT | Performed by: INTERNAL MEDICINE

## 2019-04-03 PROCEDURE — 82378 CARCINOEMBRYONIC ANTIGEN: CPT | Performed by: INTERNAL MEDICINE

## 2019-04-03 PROCEDURE — 96372 THER/PROPH/DIAG INJ SC/IM: CPT

## 2019-04-03 PROCEDURE — 85025 COMPLETE CBC W/AUTO DIFF WBC: CPT | Performed by: INTERNAL MEDICINE

## 2019-04-03 PROCEDURE — 25010000002 DENOSUMAB 60 MG/ML SOLUTION: Performed by: INTERNAL MEDICINE

## 2019-04-03 PROCEDURE — 86300 IMMUNOASSAY TUMOR CA 15-3: CPT | Performed by: INTERNAL MEDICINE

## 2019-04-03 PROCEDURE — 80053 COMPREHEN METABOLIC PANEL: CPT | Performed by: INTERNAL MEDICINE

## 2019-04-03 RX ORDER — LETROZOLE 2.5 MG/1
2.5 TABLET, FILM COATED ORAL DAILY
COMMUNITY
End: 2019-09-25 | Stop reason: SDUPTHER

## 2019-04-03 RX ADMIN — DENOSUMAB 60 MG: 60 INJECTION SUBCUTANEOUS at 09:19

## 2019-04-03 NOTE — PATIENT INSTRUCTIONS
Denosumab injection  What is this medicine?  DENOSUMAB (den oh tonja mab) slows bone breakdown. Prolia is used to treat osteoporosis in women after menopause and in men, and in people who are taking corticosteroids for 6 months or more. Xgeva is used to treat a high calcium level due to cancer and to prevent bone fractures and other bone problems caused by multiple myeloma or cancer bone metastases. Xgeva is also used to treat giant cell tumor of the bone.  This medicine may be used for other purposes; ask your health care provider or pharmacist if you have questions.  COMMON BRAND NAME(S): Prolia, XGEVA  What should I tell my health care provider before I take this medicine?  They need to know if you have any of these conditions:  -dental disease  -having surgery or tooth extraction  -infection  -kidney disease  -low levels of calcium or Vitamin D in the blood  -malnutrition  -on hemodialysis  -skin conditions or sensitivity  -thyroid or parathyroid disease  -an unusual reaction to denosumab, other medicines, foods, dyes, or preservatives  -pregnant or trying to get pregnant  -breast-feeding  How should I use this medicine?  This medicine is for injection under the skin. It is given by a health care professional in a hospital or clinic setting.  If you are getting Prolia, a special MedGuide will be given to you by the pharmacist with each prescription and refill. Be sure to read this information carefully each time.  For Prolia, talk to your pediatrician regarding the use of this medicine in children. Special care may be needed. For Xgeva, talk to your pediatrician regarding the use of this medicine in children. While this drug may be prescribed for children as young as 13 years for selected conditions, precautions do apply.  Overdosage: If you think you have taken too much of this medicine contact a poison control center or emergency room at once.  NOTE: This medicine is only for you. Do not share this medicine with  others.  What if I miss a dose?  It is important not to miss your dose. Call your doctor or health care professional if you are unable to keep an appointment.  What may interact with this medicine?  Do not take this medicine with any of the following medications:  -other medicines containing denosumab  This medicine may also interact with the following medications:  -medicines that lower your chance of fighting infection  -steroid medicines like prednisone or cortisone  This list may not describe all possible interactions. Give your health care provider a list of all the medicines, herbs, non-prescription drugs, or dietary supplements you use. Also tell them if you smoke, drink alcohol, or use illegal drugs. Some items may interact with your medicine.  What should I watch for while using this medicine?  Visit your doctor or health care professional for regular checks on your progress. Your doctor or health care professional may order blood tests and other tests to see how you are doing.  Call your doctor or health care professional for advice if you get a fever, chills or sore throat, or other symptoms of a cold or flu. Do not treat yourself. This drug may decrease your body's ability to fight infection. Try to avoid being around people who are sick.  You should make sure you get enough calcium and vitamin D while you are taking this medicine, unless your doctor tells you not to. Discuss the foods you eat and the vitamins you take with your health care professional.  See your dentist regularly. Brush and floss your teeth as directed. Before you have any dental work done, tell your dentist you are receiving this medicine.  Do not become pregnant while taking this medicine or for 5 months after stopping it. Talk with your doctor or health care professional about your birth control options while taking this medicine. Women should inform their doctor if they wish to become pregnant or think they might be pregnant. There  is a potential for serious side effects to an unborn child. Talk to your health care professional or pharmacist for more information.  What side effects may I notice from receiving this medicine?  Side effects that you should report to your doctor or health care professional as soon as possible:  -allergic reactions like skin rash, itching or hives, swelling of the face, lips, or tongue  -bone pain  -breathing problems  -dizziness  -jaw pain, especially after dental work  -redness, blistering, peeling of the skin  -signs and symptoms of infection like fever or chills; cough; sore throat; pain or trouble passing urine  -signs of low calcium like fast heartbeat, muscle cramps or muscle pain; pain, tingling, numbness in the hands or feet; seizures  -unusual bleeding or bruising  -unusually weak or tired  Side effects that usually do not require medical attention (report to your doctor or health care professional if they continue or are bothersome):  -constipation  -diarrhea  -headache  -joint pain  -loss of appetite  -muscle pain  -runny nose  -tiredness  -upset stomach  This list may not describe all possible side effects. Call your doctor for medical advice about side effects. You may report side effects to FDA at 7-809-FDA-7108.  Where should I keep my medicine?  This medicine is only given in a clinic, doctor's office, or other health care setting and will not be stored at home.  NOTE: This sheet is a summary. It may not cover all possible information. If you have questions about this medicine, talk to your doctor, pharmacist, or health care provider.  © 2019 [employer] (2018-05-23 14:50:05)

## 2019-04-04 LAB — CANCER AG27-29 SERPL-ACNC: 29.5 U/ML (ref 0–38.6)

## 2019-04-30 DIAGNOSIS — E03.9 ACQUIRED HYPOTHYROIDISM: ICD-10-CM

## 2019-04-30 DIAGNOSIS — E78.5 HYPERLIPIDEMIA, UNSPECIFIED HYPERLIPIDEMIA TYPE: Primary | ICD-10-CM

## 2019-04-30 DIAGNOSIS — E11.22 TYPE 2 DIABETES MELLITUS WITH DIABETIC CHRONIC KIDNEY DISEASE, UNSPECIFIED CKD STAGE, UNSPECIFIED WHETHER LONG TERM INSULIN USE (HCC): ICD-10-CM

## 2019-05-01 LAB
CHOLEST SERPL-MCNC: 189 MG/DL (ref 0–200)
HBA1C MFR BLD: 5.1 % (ref 4.8–5.6)
HDLC SERPL-MCNC: 37 MG/DL (ref 40–60)
LDLC SERPL CALC-MCNC: 118 MG/DL (ref 0–100)
TRIGL SERPL-MCNC: 170 MG/DL (ref 0–150)
TSH SERPL DL<=0.005 MIU/L-ACNC: 3.74 MIU/ML (ref 0.27–4.2)
VLDLC SERPL CALC-MCNC: 34 MG/DL

## 2019-05-17 ENCOUNTER — OFFICE VISIT (OUTPATIENT)
Dept: FAMILY MEDICINE CLINIC | Facility: CLINIC | Age: 74
End: 2019-05-17

## 2019-05-17 VITALS
WEIGHT: 215 LBS | HEIGHT: 61 IN | SYSTOLIC BLOOD PRESSURE: 118 MMHG | BODY MASS INDEX: 40.59 KG/M2 | RESPIRATION RATE: 16 BRPM | DIASTOLIC BLOOD PRESSURE: 76 MMHG | HEART RATE: 71 BPM | OXYGEN SATURATION: 97 %

## 2019-05-17 DIAGNOSIS — N18.30 TYPE 2 DIABETES MELLITUS WITH STAGE 3 CHRONIC KIDNEY DISEASE, WITHOUT LONG-TERM CURRENT USE OF INSULIN (HCC): ICD-10-CM

## 2019-05-17 DIAGNOSIS — E11.22 TYPE 2 DIABETES MELLITUS WITH STAGE 3 CHRONIC KIDNEY DISEASE, WITHOUT LONG-TERM CURRENT USE OF INSULIN (HCC): ICD-10-CM

## 2019-05-17 DIAGNOSIS — N18.30 CKD (CHRONIC KIDNEY DISEASE) STAGE 3, GFR 30-59 ML/MIN (HCC): ICD-10-CM

## 2019-05-17 DIAGNOSIS — I10 ESSENTIAL HYPERTENSION: Primary | ICD-10-CM

## 2019-05-17 DIAGNOSIS — E78.2 MIXED HYPERLIPIDEMIA: ICD-10-CM

## 2019-05-17 PROCEDURE — 99214 OFFICE O/P EST MOD 30 MIN: CPT | Performed by: FAMILY MEDICINE

## 2019-05-17 NOTE — PROGRESS NOTES
Subjective   Cee Quiles is a 74 y.o. female.     Chief Complaint   Patient presents with   • Follow-up     6 mo   htn        History of Present Illness     she nots good bp control witout cp or ha--she is toleraging lipitiro without myalgia s---she thinks bs are doing well wituotu hyhpoglcyemai...she is toelraiting synthroid witout heat or cold intolerances      Current Outpatient Medications:   •  aspirin 325 MG tablet, Take 325 mg by mouth As Needed., Disp: , Rfl:   •  atorvastatin (LIPITOR) 10 MG tablet, Take 10 mg by mouth Daily., Disp: , Rfl:   •  BYSTOLIC 20 MG tablet, TAKE 1 TABLET BY MOUTH  DAILY, Disp: 90 tablet, Rfl: 3  •  Denosumab (PROLIA SC), Inject  under the skin into the appropriate area as directed., Disp: , Rfl:   •  glipiZIDE (GLUCOTROL XL) 2.5 MG 24 hr tablet, TAKE 1 TABLET BY MOUTH  DAILY, Disp: 90 tablet, Rfl: 3  •  hydrochlorothiazide (HYDRODIURIL) 25 MG tablet, TAKE 1 TABLET BY MOUTH  DAILY, Disp: 90 tablet, Rfl: 3  •  HYDROcodone-acetaminophen (NORCO) 7.5-325 MG per tablet, Take 1 tablet by mouth Every 4 (Four) Hours As Needed for Moderate Pain  for up to 40 doses., Disp: 40 tablet, Rfl: 0  •  letrozole (FEMARA) 2.5 MG tablet, Take 2.5 mg by mouth Daily., Disp: , Rfl:   •  levothyroxine (SYNTHROID, LEVOTHROID) 100 MCG tablet, TAKE 1 TABLET BY MOUTH  DAILY, Disp: 90 tablet, Rfl: 3  •  losartan (COZAAR) 100 MG tablet, TAKE 1 TABLET BY MOUTH  DAILY, Disp: 90 tablet, Rfl: 3  •  meclizine (ANTIVERT) 25 MG tablet, Take 25 mg by mouth 3 (Three) Times a Day As Needed for dizziness., Disp: , Rfl:   •  ZOFRAN 8 MG tablet, Take 1 tablet by mouth Every 8 (Eight) Hours As Needed for Nausea or Vomiting for up to 10 doses., Disp: 10 tablet, Rfl: 1  Allergies   Allergen Reactions   • Keflex [Cephalexin] Anaphylaxis   • Lortab [Hydrocodone-Acetaminophen] Nausea Only       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, left     WRIST   • Cancer  "(CMS/HCC)     LOBULAR RIGHT BREAST   • Chronic knee pain     BILATERAL   • Colon polyp    • Cyst of ovary, left    • Diabetes (CMS/HCC)    • Diabetes mellitus (CMS/HCC)    • Dizziness    • Elevated cholesterol    • Gallstones    • Hx of colonic polyps    • Hyperlipidemia    • Hypertension    • Hypertension    • Hypothyroid    • Kidney disease    • PONV (postoperative nausea and vomiting)    • Torn meniscus      Past Surgical History:   Procedure Laterality Date   • CHOLECYSTECTOMY     • COLONOSCOPY W/ POLYPECTOMY  02/08/2012    Pedunculated polyp cecal pit, Hyperplastic polyp at 15 cm repeat exam in 5 years   • KNEE SURGERY Right    • MASTECTOMY W/ SENTINEL NODE BIOPSY Bilateral 2/12/2019    Procedure: BILATERAL MASTECTOMY WITH RIGHT BREAST SENTINEL LYMPH NODE BIOPSY - RADIOLOGIST WILL INJECT;  Surgeon: Michael Hodges MD;  Location: [facility] OR;  Service: General   • OVARIAN CYST DRAINAGE Left    • TUMOR REMOVAL Left        Review of Systems   Constitutional: Negative.    HENT: Negative.    Eyes: Negative.    Respiratory: Negative.    Cardiovascular: Negative.    Gastrointestinal: Negative.    Endocrine: Negative.    Genitourinary: Negative.    Musculoskeletal: Negative.    Skin: Negative.    Allergic/Immunologic: Negative.    Neurological: Negative.    Hematological: Negative.    Psychiatric/Behavioral: Negative.        Objective  /76   Pulse 71   Resp 16   Ht 154.9 cm (61\")   Wt 97.5 kg (215 lb)   SpO2 97%   BMI 40.62 kg/m²   Physical Exam   Constitutional: She is oriented to person, place, and time. She appears well-developed and well-nourished.   HENT:   Head: Normocephalic and atraumatic.   Right Ear: External ear normal.   Left Ear: External ear normal.   Nose: Nose normal.   Mouth/Throat: Oropharynx is clear and moist.   Eyes: Conjunctivae and EOM are normal. Pupils are equal, round, and reactive to light.   Neck: Normal range of motion. Neck supple.   Cardiovascular: Normal rate, regular rhythm, " normal heart sounds and intact distal pulses.   Pulmonary/Chest: Effort normal and breath sounds normal.   Abdominal: Soft. Bowel sounds are normal.   Musculoskeletal: Normal range of motion.    Cee had a diabetic foot exam performed today.   During the foot exam she had a monofilament test performed.    Neurological Sensory Findings - Unaltered hot/cold right ankle/foot discrimination and unaltered hot/cold left ankle/foot discrimination. Unaltered sharp/dull right ankle/foot discrimination and unaltered sharp/dull left ankle/foot discrimination. No right ankle/foot altered proprioception and no left ankle/foot altered proprioception  Vascular Status -  Her right foot exhibits abnormal foot vasculature . Her left foot exhibits abnormal foot vasculature .  Skin Integrity  -  Her right foot skin is not intact.  Her left foot skin is not intact..  Neurological: She is alert and oriented to person, place, and time.   Skin: Skin is warm. Capillary refill takes less than 2 seconds.   Psychiatric: She has a normal mood and affect. Her behavior is normal. Judgment and thought content normal.   Nursing note and vitals reviewed.      Assessment/Plan   Cee was seen today for follow-up.    Diagnoses and all orders for this visit:    Essential hypertension    Mixed hyperlipidemia    Type 2 diabetes mellitus with stage 3 chronic kidney disease, without long-term current use of insulin (CMS/[facility])    CKD (chronic kidney disease) stage 3, GFR 30-59 ml/min (CMS/[facility])        Patient's Body mass index is 40.62 kg/m². BMI is above normal parameters. Recommendations include: nutrition counseling.         No orders of the defined types were placed in this encounter.    Current outpatient and discharge medications have been reconciled for the patient.  Reviewed by: Neville Sandoval MD  Follow up: 6 month(s)

## 2019-06-28 RX ORDER — ATORVASTATIN CALCIUM 10 MG/1
TABLET, FILM COATED ORAL
Qty: 90 TABLET | Refills: 1 | Status: SHIPPED | OUTPATIENT
Start: 2019-06-28 | End: 2020-01-06

## 2019-07-12 ENCOUNTER — TRANSCRIBE ORDERS (OUTPATIENT)
Dept: ADMINISTRATIVE | Facility: HOSPITAL | Age: 74
End: 2019-07-12

## 2019-07-12 ENCOUNTER — LAB (OUTPATIENT)
Dept: LAB | Facility: HOSPITAL | Age: 74
End: 2019-07-12

## 2019-07-12 DIAGNOSIS — C50.919 PRIMARY MALIGNANT NEOPLASM OF FEMALE BREAST (HCC): Primary | ICD-10-CM

## 2019-07-12 DIAGNOSIS — C50.919 PRIMARY MALIGNANT NEOPLASM OF FEMALE BREAST (HCC): ICD-10-CM

## 2019-07-12 LAB
ALBUMIN SERPL-MCNC: 4 G/DL (ref 3.5–5)
ALBUMIN/GLOB SERPL: 1.3 G/DL (ref 1.1–2.5)
ALP SERPL-CCNC: 73 U/L (ref 24–120)
ALT SERPL W P-5'-P-CCNC: <15 U/L (ref 0–54)
ANION GAP SERPL CALCULATED.3IONS-SCNC: 10 MMOL/L (ref 4–13)
AST SERPL-CCNC: 22 U/L (ref 7–45)
BASOPHILS # BLD AUTO: 0.03 10*3/MM3 (ref 0–0.2)
BASOPHILS NFR BLD AUTO: 0.5 % (ref 0–2)
BILIRUB SERPL-MCNC: 0.6 MG/DL (ref 0.1–1)
BUN BLD-MCNC: 25 MG/DL (ref 5–21)
BUN/CREAT SERPL: 18.8 (ref 7–25)
CALCIUM SPEC-SCNC: 10 MG/DL (ref 8.4–10.4)
CEA SERPL-MCNC: 1.71 NG/ML (ref 0–5)
CHLORIDE SERPL-SCNC: 99 MMOL/L (ref 98–110)
CO2 SERPL-SCNC: 27 MMOL/L (ref 24–31)
CREAT BLD-MCNC: 1.33 MG/DL (ref 0.5–1.4)
DEPRECATED RDW RBC AUTO: 40.7 FL (ref 40–54)
EOSINOPHIL # BLD AUTO: 0.36 10*3/MM3 (ref 0–0.7)
EOSINOPHIL NFR BLD AUTO: 5.7 % (ref 0–4)
ERYTHROCYTE [DISTWIDTH] IN BLOOD BY AUTOMATED COUNT: 13.2 % (ref 12–15)
GFR SERPL CREATININE-BSD FRML MDRD: 39 ML/MIN/1.73
GLOBULIN UR ELPH-MCNC: 3.1 GM/DL
GLUCOSE BLD-MCNC: 109 MG/DL (ref 70–100)
HCT VFR BLD AUTO: 36.6 % (ref 37–47)
HGB BLD-MCNC: 12.5 G/DL (ref 12–16)
IMM GRANULOCYTES # BLD AUTO: 0.04 10*3/MM3 (ref 0–0.05)
IMM GRANULOCYTES NFR BLD AUTO: 0.6 % (ref 0–5)
LYMPHOCYTES # BLD AUTO: 1.36 10*3/MM3 (ref 0.72–4.86)
LYMPHOCYTES NFR BLD AUTO: 21.4 % (ref 15–45)
MCH RBC QN AUTO: 28.7 PG (ref 28–32)
MCHC RBC AUTO-ENTMCNC: 34.2 G/DL (ref 33–36)
MCV RBC AUTO: 83.9 FL (ref 82–98)
MONOCYTES # BLD AUTO: 0.45 10*3/MM3 (ref 0.19–1.3)
MONOCYTES NFR BLD AUTO: 7.1 % (ref 4–12)
NEUTROPHILS # BLD AUTO: 4.12 10*3/MM3 (ref 1.87–8.4)
NEUTROPHILS NFR BLD AUTO: 64.7 % (ref 39–78)
NRBC BLD AUTO-RTO: 0 /100 WBC (ref 0–0.2)
PLATELET # BLD AUTO: 263 10*3/MM3 (ref 130–400)
PMV BLD AUTO: 9 FL (ref 6–12)
POTASSIUM BLD-SCNC: 3.8 MMOL/L (ref 3.5–5.3)
PROT SERPL-MCNC: 7.1 G/DL (ref 6.3–8.7)
RBC # BLD AUTO: 4.36 10*6/MM3 (ref 4.2–5.4)
SODIUM BLD-SCNC: 136 MMOL/L (ref 135–145)
WBC NRBC COR # BLD: 6.36 10*3/MM3 (ref 4.8–10.8)

## 2019-07-12 PROCEDURE — 80053 COMPREHEN METABOLIC PANEL: CPT

## 2019-07-12 PROCEDURE — 82378 CARCINOEMBRYONIC ANTIGEN: CPT

## 2019-07-12 PROCEDURE — 85025 COMPLETE CBC W/AUTO DIFF WBC: CPT

## 2019-07-12 PROCEDURE — 86300 IMMUNOASSAY TUMOR CA 15-3: CPT

## 2019-07-12 PROCEDURE — 36415 COLL VENOUS BLD VENIPUNCTURE: CPT

## 2019-07-13 LAB — CANCER AG27-29 SERPL-ACNC: 21.7 U/ML (ref 0–38.6)

## 2019-09-13 DIAGNOSIS — C50.919 MALIGNANT NEOPLASM OF FEMALE BREAST, UNSPECIFIED ESTROGEN RECEPTOR STATUS, UNSPECIFIED LATERALITY, UNSPECIFIED SITE OF BREAST (HCC): Primary | ICD-10-CM

## 2019-09-25 RX ORDER — LETROZOLE 2.5 MG/1
2.5 TABLET, FILM COATED ORAL DAILY
Qty: 90 TABLET | Refills: 1 | Status: SHIPPED | OUTPATIENT
Start: 2019-09-25 | End: 2020-01-06

## 2019-10-02 ENCOUNTER — INFUSION (OUTPATIENT)
Dept: ONCOLOGY | Facility: HOSPITAL | Age: 74
End: 2019-10-02

## 2019-10-02 ENCOUNTER — TELEPHONE (OUTPATIENT)
Dept: ONCOLOGY | Facility: CLINIC | Age: 74
End: 2019-10-02

## 2019-10-02 ENCOUNTER — APPOINTMENT (OUTPATIENT)
Dept: LAB | Facility: HOSPITAL | Age: 74
End: 2019-10-02

## 2019-10-02 VITALS
RESPIRATION RATE: 18 BRPM | OXYGEN SATURATION: 94 % | SYSTOLIC BLOOD PRESSURE: 156 MMHG | DIASTOLIC BLOOD PRESSURE: 68 MMHG | BODY MASS INDEX: 40.59 KG/M2 | WEIGHT: 215 LBS | TEMPERATURE: 97 F | HEART RATE: 56 BPM | HEIGHT: 61 IN

## 2019-10-02 DIAGNOSIS — E83.42 HYPOMAGNESEMIA: ICD-10-CM

## 2019-10-02 DIAGNOSIS — Z78.0 POSTMENOPAUSAL: ICD-10-CM

## 2019-10-02 DIAGNOSIS — M81.0 OSTEOPOROSIS, UNSPECIFIED OSTEOPOROSIS TYPE, UNSPECIFIED PATHOLOGICAL FRACTURE PRESENCE: Primary | ICD-10-CM

## 2019-10-02 LAB
ALBUMIN SERPL-MCNC: 4 G/DL (ref 3.5–5.2)
ALBUMIN/GLOB SERPL: 1.2 G/DL
ALP SERPL-CCNC: 63 U/L (ref 39–117)
ALT SERPL W P-5'-P-CCNC: 12 U/L (ref 1–33)
ANION GAP SERPL CALCULATED.3IONS-SCNC: 12 MMOL/L (ref 5–15)
AST SERPL-CCNC: 13 U/L (ref 1–32)
BILIRUB SERPL-MCNC: 0.5 MG/DL (ref 0.2–1.2)
BUN BLD-MCNC: 24 MG/DL (ref 8–23)
BUN/CREAT SERPL: 19.8 (ref 7–25)
CALCIUM SPEC-SCNC: 9.6 MG/DL (ref 8.6–10.5)
CHLORIDE SERPL-SCNC: 96 MMOL/L (ref 98–107)
CO2 SERPL-SCNC: 27 MMOL/L (ref 22–29)
CREAT BLD-MCNC: 1.21 MG/DL (ref 0.57–1)
GFR SERPL CREATININE-BSD FRML MDRD: 44 ML/MIN/1.73
GLOBULIN UR ELPH-MCNC: 3.3 GM/DL
GLUCOSE BLD-MCNC: 134 MG/DL (ref 65–99)
MAGNESIUM SERPL-MCNC: 1.3 MG/DL (ref 1.6–2.4)
PHOSPHATE SERPL-MCNC: 3.9 MG/DL (ref 2.5–4.5)
POTASSIUM BLD-SCNC: 4 MMOL/L (ref 3.5–5.2)
PROT SERPL-MCNC: 7.3 G/DL (ref 6–8.5)
SODIUM BLD-SCNC: 135 MMOL/L (ref 136–145)

## 2019-10-02 PROCEDURE — 25010000002 MAGNESIUM SULFATE 2 GM/50ML SOLUTION: Performed by: INTERNAL MEDICINE

## 2019-10-02 PROCEDURE — 84100 ASSAY OF PHOSPHORUS: CPT

## 2019-10-02 PROCEDURE — 36415 COLL VENOUS BLD VENIPUNCTURE: CPT | Performed by: INTERNAL MEDICINE

## 2019-10-02 PROCEDURE — 80053 COMPREHEN METABOLIC PANEL: CPT | Performed by: INTERNAL MEDICINE

## 2019-10-02 PROCEDURE — 83735 ASSAY OF MAGNESIUM: CPT

## 2019-10-02 PROCEDURE — 96372 THER/PROPH/DIAG INJ SC/IM: CPT

## 2019-10-02 PROCEDURE — 25010000002 DENOSUMAB 60 MG/ML SOLUTION PREFILLED SYRINGE: Performed by: INTERNAL MEDICINE

## 2019-10-02 PROCEDURE — 96366 THER/PROPH/DIAG IV INF ADDON: CPT

## 2019-10-02 PROCEDURE — 96365 THER/PROPH/DIAG IV INF INIT: CPT

## 2019-10-02 RX ORDER — MAGNESIUM SULFATE HEPTAHYDRATE 40 MG/ML
2 INJECTION, SOLUTION INTRAVENOUS ONCE
Status: COMPLETED | OUTPATIENT
Start: 2019-10-02 | End: 2019-10-02

## 2019-10-02 RX ORDER — SODIUM CHLORIDE 9 MG/ML
250 INJECTION, SOLUTION INTRAVENOUS ONCE
Status: CANCELLED | OUTPATIENT
Start: 2019-10-02

## 2019-10-02 RX ORDER — SODIUM CHLORIDE 9 MG/ML
250 INJECTION, SOLUTION INTRAVENOUS ONCE
Status: COMPLETED | OUTPATIENT
Start: 2019-10-02 | End: 2019-10-02

## 2019-10-02 RX ADMIN — SODIUM CHLORIDE 250 ML: 9 INJECTION, SOLUTION INTRAVENOUS at 10:15

## 2019-10-02 RX ADMIN — DENOSUMAB 60 MG: 60 INJECTION SUBCUTANEOUS at 10:27

## 2019-10-02 RX ADMIN — MAGNESIUM SULFATE HEPTAHYDRATE 2 G: 40 INJECTION, SOLUTION INTRAVENOUS at 10:24

## 2019-10-02 NOTE — TELEPHONE ENCOUNTER
Notified Dr. Cartwright that Magnesum level is 1.3.  Verbal order from Dr. Cartwright for Magnesium 2 g IV over 1 hour.

## 2019-10-08 ENCOUNTER — TELEPHONE (OUTPATIENT)
Dept: FAMILY MEDICINE CLINIC | Facility: CLINIC | Age: 74
End: 2019-10-08

## 2019-10-08 RX ORDER — VALSARTAN 320 MG/1
320 TABLET ORAL DAILY
Qty: 90 TABLET | Refills: 1 | Status: SHIPPED | OUTPATIENT
Start: 2019-10-08 | End: 2020-01-06

## 2019-10-08 NOTE — TELEPHONE ENCOUNTER
Pt called and stated that the losartan she is on has been on recall she got a letter online from mail in order so she asked that u change her medication? [phone]

## 2019-11-12 ENCOUNTER — LAB (OUTPATIENT)
Dept: LAB | Facility: HOSPITAL | Age: 74
End: 2019-11-12

## 2019-11-12 DIAGNOSIS — C50.919 MALIGNANT NEOPLASM OF FEMALE BREAST, UNSPECIFIED ESTROGEN RECEPTOR STATUS, UNSPECIFIED LATERALITY, UNSPECIFIED SITE OF BREAST (HCC): ICD-10-CM

## 2019-11-12 LAB
ALBUMIN SERPL-MCNC: 4.2 G/DL (ref 3.5–5.2)
ALBUMIN/GLOB SERPL: 1.4 G/DL
ALP SERPL-CCNC: 58 U/L (ref 39–117)
ALT SERPL W P-5'-P-CCNC: 8 U/L (ref 1–33)
ANION GAP SERPL CALCULATED.3IONS-SCNC: 11 MMOL/L (ref 5–15)
AST SERPL-CCNC: 15 U/L (ref 1–32)
BASOPHILS # BLD AUTO: 0.04 10*3/MM3 (ref 0–0.2)
BASOPHILS NFR BLD AUTO: 0.6 % (ref 0–1.5)
BILIRUB SERPL-MCNC: 0.4 MG/DL (ref 0.2–1.2)
BUN BLD-MCNC: 36 MG/DL (ref 8–23)
BUN/CREAT SERPL: 23.5 (ref 7–25)
CALCIUM SPEC-SCNC: 8.9 MG/DL (ref 8.6–10.5)
CEA SERPL-MCNC: 1.51 NG/ML
CHLORIDE SERPL-SCNC: 100 MMOL/L (ref 98–107)
CO2 SERPL-SCNC: 27 MMOL/L (ref 22–29)
CREAT BLD-MCNC: 1.53 MG/DL (ref 0.57–1)
DEPRECATED RDW RBC AUTO: 41.5 FL (ref 37–54)
EOSINOPHIL # BLD AUTO: 0.2 10*3/MM3 (ref 0–0.4)
EOSINOPHIL NFR BLD AUTO: 3.1 % (ref 0.3–6.2)
ERYTHROCYTE [DISTWIDTH] IN BLOOD BY AUTOMATED COUNT: 13.4 % (ref 12.3–15.4)
GFR SERPL CREATININE-BSD FRML MDRD: 33 ML/MIN/1.73
GLOBULIN UR ELPH-MCNC: 3.1 GM/DL
GLUCOSE BLD-MCNC: 124 MG/DL (ref 65–99)
HCT VFR BLD AUTO: 35.9 % (ref 34–46.6)
HGB BLD-MCNC: 12.4 G/DL (ref 12–15.9)
IMM GRANULOCYTES # BLD AUTO: 0.03 10*3/MM3 (ref 0–0.05)
IMM GRANULOCYTES NFR BLD AUTO: 0.5 % (ref 0–0.5)
LYMPHOCYTES # BLD AUTO: 1.19 10*3/MM3 (ref 0.7–3.1)
LYMPHOCYTES NFR BLD AUTO: 18.3 % (ref 19.6–45.3)
MCH RBC QN AUTO: 29 PG (ref 26.6–33)
MCHC RBC AUTO-ENTMCNC: 34.5 G/DL (ref 31.5–35.7)
MCV RBC AUTO: 83.9 FL (ref 79–97)
MONOCYTES # BLD AUTO: 0.43 10*3/MM3 (ref 0.1–0.9)
MONOCYTES NFR BLD AUTO: 6.6 % (ref 5–12)
NEUTROPHILS # BLD AUTO: 4.62 10*3/MM3 (ref 1.7–7)
NEUTROPHILS NFR BLD AUTO: 70.9 % (ref 42.7–76)
NRBC BLD AUTO-RTO: 0 /100 WBC (ref 0–0.2)
PLATELET # BLD AUTO: 300 10*3/MM3 (ref 140–450)
PMV BLD AUTO: 9.1 FL (ref 6–12)
POTASSIUM BLD-SCNC: 4 MMOL/L (ref 3.5–5.2)
PROT SERPL-MCNC: 7.3 G/DL (ref 6–8.5)
RBC # BLD AUTO: 4.28 10*6/MM3 (ref 3.77–5.28)
SODIUM BLD-SCNC: 138 MMOL/L (ref 136–145)
WBC NRBC COR # BLD: 6.51 10*3/MM3 (ref 3.4–10.8)

## 2019-11-12 PROCEDURE — 36415 COLL VENOUS BLD VENIPUNCTURE: CPT

## 2019-11-12 PROCEDURE — 82378 CARCINOEMBRYONIC ANTIGEN: CPT

## 2019-11-12 PROCEDURE — 85025 COMPLETE CBC W/AUTO DIFF WBC: CPT

## 2019-11-12 PROCEDURE — 86300 IMMUNOASSAY TUMOR CA 15-3: CPT

## 2019-11-12 PROCEDURE — 80053 COMPREHEN METABOLIC PANEL: CPT

## 2019-11-12 NOTE — PROGRESS NOTES
MGW ONC [facility] HEMATOLOGY AND ONCOLOGY  [address]  [facility] [address] [phone]    Patient Name: Cee Quiles  Encounter Date: 11/19/2019  YOB: 1945  Patient Number: [id number]      REASON FOR FOLLOW-UP:  Cee Quiles is a pleasant 74-year-old  female who is seen on followup for Stage IA right breast cancer, receptor positive, and HER-2/jeremy negative.  Low recurrence score of 6, absolute benefit of chemo is less than 1%.   She is on adjuvant Femara for the past 8 months.  She is seen with spouse, Jason.  She is a reliable historian.         Oncology/Hematology History    DIAGNOSTIC ABNORMALITIES:  The patient was found to have a right breast mass by mammogram at [facility].   Right breast core biopsy 01/17/2019 showed infiltrating lobular carcinoma, grade 1. Greatest dimension of tumor measured 11.1 mm. ER 98%, TX 92%, HER-2/jeremy +1 and negative FISH, and Ki-67 at 7%,. Oncotype DX report. Low recurrence score of 6, absolute benefit of chemo is less than 1%  Breast MRI [facility] 01/30/2019, 1.8 x 0.7 x 0.6 cm, non mass-like linear enhancement at 1 o'clock. Second suspicious area of enhancement in the right breast just behind the nipple measuring 1 x 1 x 0.9 cm. Two areas of linear non mass-like enhancement has anterior depth at 1 o'clock in the left breast.   The patient was seen by Dr. Hodges 02/01/2019. Planned for bilateral mastectomy and sentinel lymph node evaluation.   Ultrasound biopsy left breast showed atypical lobular hyperplasia. No histologic evidence of malignancy.   The patient had a followup with Dr. Hodges 02/13/2019.  CBC 02/13/2019 revealed a WBC of 10.6, hemoglobin 12.2, hematocrit 36.8, MCV 86.4, platelet count 211,000 with ANC of 9.4. CMP remarkable for a GFR of 46 mL/minute.  Pathology report 02/15/2019 left breast showed no evidence of in situ or invasive  carcinoma. Right breast showed an infiltrating lobular carcinoma, grade 1 with residual 8 mm.       PREVIOUS INTERVENTIONS:   Bilateral mastectomy with right breast sentinel lymph node biopsy on 02/12/2019.  Adjuvant Femara 2.5 mg p.o. daily 03/22/2019 through present.        Breast CA (CMS/HCC)    2/12/2019 Initial Diagnosis     Breast CA (CMS/[facility])            PAST MEDICAL HISTORY:  ALLERGIES:  Allergies   Allergen Reactions   • Keflex [Cephalexin] Anaphylaxis   • Lortab [Hydrocodone-Acetaminophen] Nausea Only     CURRENT MEDICATIONS:  Outpatient Encounter Medications as of 11/19/2019   Medication Sig Dispense Refill   • aspirin 325 MG tablet Take 325 mg by mouth As Needed.     • atorvastatin (LIPITOR) 10 MG tablet TAKE 1 TABLET BY MOUTH  DAILY 90 tablet 1   • BYSTOLIC 20 MG tablet TAKE 1 TABLET BY MOUTH  DAILY 90 tablet 3   • Denosumab (PROLIA SC) Inject  under the skin into the appropriate area as directed.     • glipiZIDE (GLUCOTROL XL) 2.5 MG 24 hr tablet TAKE 1 TABLET BY MOUTH  DAILY 90 tablet 3   • hydrochlorothiazide (HYDRODIURIL) 25 MG tablet TAKE 1 TABLET BY MOUTH  DAILY (Patient taking differently: Take 12.5 mg by mouth Daily.) 90 tablet 3   • letrozole (FEMARA) 2.5 MG tablet Take 1 tablet by mouth Daily. 90 tablet 1   • levothyroxine (SYNTHROID, LEVOTHROID) 100 MCG tablet TAKE 1 TABLET BY MOUTH  DAILY 90 tablet 3   • meclizine (ANTIVERT) 25 MG tablet Take 25 mg by mouth 3 (Three) Times a Day As Needed for dizziness.     • valsartan (DIOVAN) 320 MG tablet Take 1 tablet by mouth Daily. 90 tablet 1   • [DISCONTINUED] HYDROcodone-acetaminophen (NORCO) 7.5-325 MG per tablet Take 1 tablet by mouth Every 4 (Four) Hours As Needed for Moderate Pain  for up to 40 doses. 40 tablet 0   • [DISCONTINUED] losartan (COZAAR) 100 MG tablet TAKE 1 TABLET BY MOUTH  DAILY 90 tablet 3   • [DISCONTINUED] ZOFRAN 8 MG tablet Take 1 tablet by mouth Every 8 (Eight) Hours As Needed for Nausea or Vomiting for up to 10 doses. 10  tablet 1     Facility-Administered Encounter Medications as of 11/19/2019   Medication Dose Route Frequency Provider Last Rate Last Dose   • [COMPLETED] pneumococcal conj. 13-valent (PREVNAR-13) vaccine 0.5 mL  0.5 mL Intramuscular Once Neville Sandoval MD   0.5 mL at 11/18/19 1653     ADULT ILLNESSES:  Patient Active Problem List   Diagnosis Code   • Type 2 diabetes mellitus with diabetic chronic kidney disease (CMS/[facility]) E11.22   • Essential hypertension I10   • Acquired hypothyroidism E03.9   • Chronic pain of right knee M25.561, G89.29   • CKD (chronic kidney disease) stage 3, GFR 30-59 ml/min (CMS/[facility]) N18.3   • Mixed hyperlipidemia E78.2   • Morbidly obese (CMS/[facility]) E66.01   • Breast CA (CMS/[facility]) C50.919   • Postmenopausal Z78.0   • Hypomagnesemia E83.42     SURGERIES:  Past Surgical History:   Procedure Laterality Date   • CHOLECYSTECTOMY     • COLONOSCOPY W/ POLYPECTOMY  02/08/2012    Pedunculated polyp cecal pit, Hyperplastic polyp at 15 cm repeat exam in 5 years   • KNEE SURGERY Right    • MASTECTOMY W/ SENTINEL NODE BIOPSY Bilateral 2/12/2019    Procedure: BILATERAL MASTECTOMY WITH RIGHT BREAST SENTINEL LYMPH NODE BIOPSY - RADIOLOGIST WILL INJECT;  Surgeon: Michael Hodges MD;  Location: [facility] OR;  Service: General   • OVARIAN CYST DRAINAGE Left    • TUMOR REMOVAL Left      HEALTH MAINTENANCE ITEMS:  Health Maintenance Due   Topic Date Due   • TDAP/TD VACCINES (1 - Tdap) 01/24/1966   • ZOSTER VACCINE (1 of 2) 01/24/1997   • HEPATITIS C SCREENING  10/21/2016   • MEDICARE ANNUAL WELLNESS  05/18/2019   • URINE MICROALBUMIN  05/18/2019   • DIABETIC EYE EXAM  11/08/2019       <no information>  Last Completed Colonoscopy       Status Date      COLONOSCOPY Done 7/31/2017 Ext Proc: GA COLSC FLX W/RMVL OF TUMOR POLYP LESION SNARE TQ     Patient has more history with this topic...        Immunization History   Administered Date(s) Administered   • Flu Vaccine Quad PF >18YRS 11/13/2017   • Flu Vaccine Quad  "PF >36MO 11/13/2017   • Fluad Quad 11/18/2019   • Fluzone High Dose =>65 Years ([employer] ONLY) 10/23/2015, 10/31/2016, 10/31/2018   • Pneumococcal Conjugate 13-Valent (PCV13) 11/18/2019     Last Completed Mammogram       Status Date      MAMMOGRAM Done 12/9/2015 Ext Proc: SC SCREENINGMAMMOGRAPHYDIGITAL     Patient has more history with this topic...            FAMILY HISTORY:  Family History   Problem Relation Age of Onset   • Colon cancer Neg Hx    • Colon polyps Neg Hx      SOCIAL HISTORY:  Social History     Socioeconomic History   • Marital status:      Spouse name: Not on file   • Number of children: Not on file   • Years of education: Not on file   • Highest education level: Not on file   Occupational History   • Occupation: retired   Tobacco Use   • Smoking status: Never Smoker   • Smokeless tobacco: Never Used   Substance and Sexual Activity   • Alcohol use: No   • Drug use: No   • Sexual activity: Defer       REVIEW OF SYSTEMS:    Review of Systems   Constitutional: Negative for chills, diaphoresis, fatigue and fever.        \"I feel fine.  The pill I don't see a difference.\"   HENT: Negative for mouth sores, nosebleeds and trouble swallowing.    Eyes: Negative for redness and visual disturbance.   Respiratory: Negative for shortness of breath and wheezing.    Cardiovascular: Negative for chest pain, palpitations and leg swelling.   Gastrointestinal: Negative for abdominal distention, blood in stool, constipation, diarrhea, nausea and vomiting.   Endocrine: Negative for cold intolerance and heat intolerance.   Genitourinary: Negative for difficulty urinating, flank pain, frequency and vaginal bleeding.   Musculoskeletal: Negative for arthralgias.   Skin: Negative for pallor.   Allergic/Immunologic: Negative for food allergies.   Neurological: Negative for dizziness, tremors, speech difficulty, weakness and headaches.   Hematological: Negative for adenopathy. Does not bruise/bleed easily. " "  Psychiatric/Behavioral: Negative for agitation, confusion and hallucinations. The patient is not nervous/anxious.        VITAL SIGNS: /94   Pulse 76   Temp 97.8 °F (36.6 °C) (Temporal)   Resp 16   Ht 154.9 cm (61\")   Wt 97.1 kg (214 lb)   SpO2 98%   Breastfeeding? No   BMI 40.43 kg/m²   Pain Score    11/19/19 0922   PainSc: 0-No pain       PHYSICAL EXAMINATION:     Physical Exam   Constitutional: She is oriented to person, place, and time. She appears well-developed and well-nourished. No distress.   HENT:   Head: Normocephalic and atraumatic.   Eyes: EOM are normal. Pupils are equal, round, and reactive to light. No scleral icterus.   Neck: Trachea normal. Neck supple.   Cardiovascular: Normal rate, regular rhythm and normal pulses.   No murmur heard.  Pulmonary/Chest: Effort normal and breath sounds normal. She has no wheezes. She has no rhonchi. She has no rales. Chest wall is not dull to percussion.   Bilateral mastectomy scars.   Abdominal: Soft. Normal appearance and bowel sounds are normal. There is no tenderness. There is no rebound and no guarding.   Musculoskeletal: She exhibits no edema.   Lymphadenopathy:     She has no cervical adenopathy.     She has no axillary adenopathy.        Right: No inguinal and no supraclavicular adenopathy present.        Left: No inguinal and no supraclavicular adenopathy present.   Neurological: She is alert and oriented to person, place, and time. She has normal strength. No sensory deficit.   Skin: Skin is warm and dry. She is not diaphoretic. No pallor.   Psychiatric: She has a normal mood and affect. Her behavior is normal. Judgment and thought content normal.   Vitals reviewed.      LABS    Lab Results - Last 18 Months   Lab Units 11/13/19  0950 11/12/19  0923 07/12/19  0916 04/03/19  0840 02/13/19  0542 02/01/19  1304   HEMOGLOBIN g/dL 12.0 12.4 12.5 12.8 12.2 13.7   HEMATOCRIT % 35.4 35.9 36.6* 36.8* 36.8* 40.3   MCV fL 86.1 83.9 83.9 85.6 86.4 86.5 "   WBC 10*3/mm3 6.58 6.51 6.36 7.33 10.61 6.38   RDW % 13.6 13.4 13.2 13.8 13.4 13.3   MPV fL  --  9.1 9.0 9.0 9.2 9.4   PLATELETS 10*3/mm3 286 300 263 263 211 260   IMM GRAN % %  --  0.5 0.6 1.0 0.4 0.6   NEUTROS ABS 10*3/mm3 3.99 4.62 4.12 5.38 9.46* 4.60   LYMPHS ABS 10*3/mm3 1.75 1.19 1.36 1.33 0.48* 1.18   MONOS ABS 10*3/mm3 0.54 0.43 0.45 0.51 0.62 0.35   EOS ABS 10*3/mm3 0.22 0.20 0.36 0.00 0.00 0.17   BASOS ABS 10*3/mm3 0.05 0.04 0.03 0.04 0.01 0.04   IMMATURE GRANS (ABS) 10*3/mm3  --  0.03 0.04 0.07* 0.04 0.04*   NRBC /100 WBC 0.0 0.0 0.0 0.0 0.0 0.0       Lab Results - Last 18 Months   Lab Units 11/13/19  0950 11/12/19  0923 10/02/19  0906 07/12/19  0916 04/03/19  0840 03/07/19  0900 02/13/19  0542   GLUCOSE mg/dL  --  124* 134* 109* 102* 116* 151*   SODIUM mmol/L 135* 138 135* 136 140 134* 133*   POTASSIUM mmol/L 4.5 4.0 4.0 3.8 3.9 5.0 4.2   TOTAL CO2 mmol/L 26.6  --   --   --   --   --   --    CO2 mmol/L  --  27.0 27.0 27.0 28.0 23.0* 28.0   CHLORIDE mmol/L 98 100 96* 99 100 98 95*   ANION GAP mmol/L  --  11.0 12.0 10.0 12.0 13.0 10.0   CREATININE mg/dL 1.34* 1.53* 1.21* 1.33 1.30 1.70* 1.15   BUN mg/dL 28* 36* 24* 25* 26* 30* 20   BUN / CREAT RATIO  20.9 23.5 19.8 18.8 20.0 17.6 17.4   CALCIUM mg/dL 9.1 8.9 9.6 10.0 9.4 9.7 8.9   EGFR IF NONAFRICN AM mL/min/1.73 39* 33* 44* 39* 40* 30* 46*   ALK PHOS U/L 50 58 63 73 106 75 49   TOTAL PROTEIN g/dL  --  7.3 7.3 7.1 7.3 7.2 6.6   ALT (SGPT) U/L 9 8 12 <15 <15 25 28   AST (SGOT) U/L 12 15 13 22 21 17 23   BILIRUBIN mg/dL 0.6 0.4 0.5 0.6 0.6 0.6 0.9   ALBUMIN g/dL 4.30 4.20 4.00 4.00 4.20 4.10 4.00   GLOBULIN gm/dL  --  3.1 3.3 3.1 3.1 3.1 2.6       Lab Results - Last 18 Months   Lab Units 11/12/19  0923 07/12/19  0916 04/03/19  0840 03/07/19  0900   CEA ng/mL 1.51 1.71 1.37 1.08       Lab Results - Last 18 Months   Lab Units 11/13/19  0950 04/30/19  0754   TSH uIU/mL 1.970 3.740       [unfilled]    Patient's Body mass index is 40.43 kg/m². BMI is  "above normal parameters. Recommendations include: referral to primary care.      ASSESSMENT:  1.    Carcinoma of the right breast, infiltrating lobular.  12 o'clock. Low recurrence score of 6, absolute benefit of chemo is less than 1%:  Current Stage: AJCC Stage IA (T1c N0, M0, G1)   Tumor size 1.91 cm.  Hormone Status: ER 98%, DE 92%, HER-2/jeremy negative by FISH.  Baseline Node Status: 0/2 positive.  Treatment status: Post bilateral mastectomy and right sentinel node biopsy.  On adjuvant Femara 2.5 mg 03/22/2019.  Plan for 10 years.   2.    Performance status of 0.   3.    Obesity BMI 40.4.   4.    Osteopenia.   5.    Type 2 diabetes.   6.    Chronic kidney disease Stage III, GFR 30 mL/minute 03/07/2019.       PLAN:  1.     Re:  Tolerating Femara.   2.     Re:  Heme status.  Hemoglobin 12 gm.  3.     Re:  Pre-office CMP.  GFR 39 mL/minute.  4.     Re:  Pre-office CEA. Normal.  5.     Re:  Pre-office CA27-29. Normal.   6.    Refer to PCP for obesity.   7.    Schedule Prolia 60 mg subcutaneously every 6 months.  Observe for potential osteonecrosis of the jaw.  8.    eRx for Femara 2.5 mg p.o. daily, 90, 3 refills if needed.  Stop and call if intolerant.  Observe for potential bone loss, thrombosis, and hot flashes.  Plan for 10 years.  High benefit for extended therapy.   9.    Next bone density 03/2021.   10.  Continue ongoing management per primary care physician and other specialists.  11.  Plan of care discussed with patient and spouse.  Understanding expressed.  Patient agreeable to proceed.  12.  She will return the completed Advance Directive next visit. \"We chose not to.\"  13.  Continue currently identified medications.  14.  Refer to PCP for obesity.  15.  Return to office in 4 months with pre-office CBC with differential, CEA, CA27-29, and CMP.       TIME SPENT: Face-to-face time on this encounter as defined by the American Medical Association in the 2019 Current Procedural " Terminology code book is 29 minutes.          cc: Michael Hodges MD (referring)       MD Neville Vieira MD (PCP)

## 2019-11-13 DIAGNOSIS — E11.22 TYPE 2 DIABETES MELLITUS WITH STAGE 3 CHRONIC KIDNEY DISEASE, WITHOUT LONG-TERM CURRENT USE OF INSULIN (HCC): ICD-10-CM

## 2019-11-13 DIAGNOSIS — E03.9 ACQUIRED HYPOTHYROIDISM: ICD-10-CM

## 2019-11-13 DIAGNOSIS — E78.2 MIXED HYPERLIPIDEMIA: ICD-10-CM

## 2019-11-13 DIAGNOSIS — I10 ESSENTIAL HYPERTENSION: Primary | ICD-10-CM

## 2019-11-13 DIAGNOSIS — N18.30 TYPE 2 DIABETES MELLITUS WITH STAGE 3 CHRONIC KIDNEY DISEASE, WITHOUT LONG-TERM CURRENT USE OF INSULIN (HCC): ICD-10-CM

## 2019-11-13 LAB — CANCER AG27-29 SERPL-ACNC: 20.3 U/ML (ref 0–38.6)

## 2019-11-14 LAB
ALBUMIN SERPL-MCNC: 4.3 G/DL (ref 3.5–5.2)
ALBUMIN/GLOB SERPL: 1.6 G/DL
ALP SERPL-CCNC: 50 U/L (ref 39–117)
ALT SERPL-CCNC: 9 U/L (ref 1–33)
AST SERPL-CCNC: 12 U/L (ref 1–32)
BASOPHILS # BLD AUTO: 0.05 10*3/MM3 (ref 0–0.2)
BASOPHILS NFR BLD AUTO: 0.8 % (ref 0–1.5)
BILIRUB SERPL-MCNC: 0.6 MG/DL (ref 0.2–1.2)
BUN SERPL-MCNC: 28 MG/DL (ref 8–23)
BUN/CREAT SERPL: 20.9 (ref 7–25)
CALCIUM SERPL-MCNC: 9.1 MG/DL (ref 8.6–10.5)
CHLORIDE SERPL-SCNC: 98 MMOL/L (ref 98–107)
CHOLEST SERPL-MCNC: 182 MG/DL (ref 0–200)
CO2 SERPL-SCNC: 26.6 MMOL/L (ref 22–29)
CREAT SERPL-MCNC: 1.34 MG/DL (ref 0.57–1)
EOSINOPHIL # BLD AUTO: 0.22 10*3/MM3 (ref 0–0.4)
EOSINOPHIL NFR BLD AUTO: 3.3 % (ref 0.3–6.2)
ERYTHROCYTE [DISTWIDTH] IN BLOOD BY AUTOMATED COUNT: 13.6 % (ref 12.3–15.4)
GLOBULIN SER CALC-MCNC: 2.7 GM/DL
GLUCOSE SERPL-MCNC: 99 MG/DL (ref 65–99)
HBA1C MFR BLD: 5.5 % (ref 4.8–5.6)
HCT VFR BLD AUTO: 35.4 % (ref 34–46.6)
HDLC SERPL-MCNC: 35 MG/DL (ref 40–60)
HGB BLD-MCNC: 12 G/DL (ref 12–15.9)
IMM GRANULOCYTES # BLD AUTO: 0.03 10*3/MM3 (ref 0–0.05)
IMM GRANULOCYTES NFR BLD AUTO: 0.5 % (ref 0–0.5)
LDLC SERPL CALC-MCNC: 117 MG/DL (ref 0–100)
LYMPHOCYTES # BLD AUTO: 1.75 10*3/MM3 (ref 0.7–3.1)
LYMPHOCYTES NFR BLD AUTO: 26.6 % (ref 19.6–45.3)
MCH RBC QN AUTO: 29.2 PG (ref 26.6–33)
MCHC RBC AUTO-ENTMCNC: 33.9 G/DL (ref 31.5–35.7)
MCV RBC AUTO: 86.1 FL (ref 79–97)
MONOCYTES # BLD AUTO: 0.54 10*3/MM3 (ref 0.1–0.9)
MONOCYTES NFR BLD AUTO: 8.2 % (ref 5–12)
NEUTROPHILS # BLD AUTO: 3.99 10*3/MM3 (ref 1.7–7)
NEUTROPHILS NFR BLD AUTO: 60.6 % (ref 42.7–76)
NRBC BLD AUTO-RTO: 0 /100 WBC (ref 0–0.2)
PLATELET # BLD AUTO: 286 10*3/MM3 (ref 140–450)
POTASSIUM SERPL-SCNC: 4.5 MMOL/L (ref 3.5–5.2)
PROT SERPL-MCNC: 7 G/DL (ref 6–8.5)
RBC # BLD AUTO: 4.11 10*6/MM3 (ref 3.77–5.28)
SODIUM SERPL-SCNC: 135 MMOL/L (ref 136–145)
TRIGL SERPL-MCNC: 152 MG/DL (ref 0–150)
TSH SERPL DL<=0.005 MIU/L-ACNC: 1.97 UIU/ML (ref 0.27–4.2)
VLDLC SERPL CALC-MCNC: 30.4 MG/DL
WBC # BLD AUTO: 6.58 10*3/MM3 (ref 3.4–10.8)

## 2019-11-18 ENCOUNTER — OFFICE VISIT (OUTPATIENT)
Dept: FAMILY MEDICINE CLINIC | Facility: CLINIC | Age: 74
End: 2019-11-18

## 2019-11-18 VITALS
HEIGHT: 61 IN | RESPIRATION RATE: 16 BRPM | BODY MASS INDEX: 40.22 KG/M2 | HEART RATE: 64 BPM | OXYGEN SATURATION: 97 % | SYSTOLIC BLOOD PRESSURE: 130 MMHG | DIASTOLIC BLOOD PRESSURE: 80 MMHG | WEIGHT: 213 LBS

## 2019-11-18 DIAGNOSIS — I10 ESSENTIAL HYPERTENSION: Primary | ICD-10-CM

## 2019-11-18 DIAGNOSIS — C50.919 MALIGNANT NEOPLASM OF FEMALE BREAST, UNSPECIFIED ESTROGEN RECEPTOR STATUS, UNSPECIFIED LATERALITY, UNSPECIFIED SITE OF BREAST (HCC): ICD-10-CM

## 2019-11-18 DIAGNOSIS — E11.22 TYPE 2 DIABETES MELLITUS WITH STAGE 3 CHRONIC KIDNEY DISEASE, WITHOUT LONG-TERM CURRENT USE OF INSULIN (HCC): ICD-10-CM

## 2019-11-18 DIAGNOSIS — E03.9 ACQUIRED HYPOTHYROIDISM: ICD-10-CM

## 2019-11-18 DIAGNOSIS — Z23 NEED FOR VACCINATION: ICD-10-CM

## 2019-11-18 DIAGNOSIS — N18.30 TYPE 2 DIABETES MELLITUS WITH STAGE 3 CHRONIC KIDNEY DISEASE, WITHOUT LONG-TERM CURRENT USE OF INSULIN (HCC): ICD-10-CM

## 2019-11-18 PROCEDURE — 90653 IIV ADJUVANT VACCINE IM: CPT | Performed by: FAMILY MEDICINE

## 2019-11-18 PROCEDURE — 99214 OFFICE O/P EST MOD 30 MIN: CPT | Performed by: FAMILY MEDICINE

## 2019-11-18 PROCEDURE — G0008 ADMIN INFLUENZA VIRUS VAC: HCPCS | Performed by: FAMILY MEDICINE

## 2019-11-18 NOTE — PROGRESS NOTES
Subjective   Cee Quiles is a 74 y.o. female.     Chief Complaint   Patient presents with   • Follow-up     6 mo   htn        History of Present Illness     she will see dr barlow tomorrow regarding her breast ca hx--she thinks her bs are stable --she says her nephroloigist redcuced her diauretic dosage..--she is toleraging synthroid without heat or cold intolerances...      Current Outpatient Medications:   •  aspirin 325 MG tablet, Take 325 mg by mouth As Needed., Disp: , Rfl:   •  atorvastatin (LIPITOR) 10 MG tablet, TAKE 1 TABLET BY MOUTH  DAILY, Disp: 90 tablet, Rfl: 1  •  BYSTOLIC 20 MG tablet, TAKE 1 TABLET BY MOUTH  DAILY, Disp: 90 tablet, Rfl: 3  •  Denosumab (PROLIA SC), Inject  under the skin into the appropriate area as directed., Disp: , Rfl:   •  glipiZIDE (GLUCOTROL XL) 2.5 MG 24 hr tablet, TAKE 1 TABLET BY MOUTH  DAILY, Disp: 90 tablet, Rfl: 3  •  hydrochlorothiazide (HYDRODIURIL) 25 MG tablet, TAKE 1 TABLET BY MOUTH  DAILY, Disp: 90 tablet, Rfl: 3  •  letrozole (FEMARA) 2.5 MG tablet, Take 1 tablet by mouth Daily., Disp: 90 tablet, Rfl: 1  •  levothyroxine (SYNTHROID, LEVOTHROID) 100 MCG tablet, TAKE 1 TABLET BY MOUTH  DAILY, Disp: 90 tablet, Rfl: 3  •  meclizine (ANTIVERT) 25 MG tablet, Take 25 mg by mouth 3 (Three) Times a Day As Needed for dizziness., Disp: , Rfl:   •  valsartan (DIOVAN) 320 MG tablet, Take 1 tablet by mouth Daily., Disp: 90 tablet, Rfl: 1  Allergies   Allergen Reactions   • Keflex [Cephalexin] Anaphylaxis   • Lortab [Hydrocodone-Acetaminophen] Nausea Only       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, left     WRIST   • Cancer (CMS/HCC)     LOBULAR RIGHT BREAST   • Chronic knee pain     BILATERAL   • Colon polyp    • Cyst of ovary, left    • Diabetes (CMS/HCC)    • Diabetes mellitus (CMS/HCC)    • Dizziness    • Elevated cholesterol    • Gallstones    • Hx of colonic polyps    • Hyperlipidemia    • Hypertension    •  "Hypertension    • Hypothyroid    • Kidney disease    • PONV (postoperative nausea and vomiting)    • Torn meniscus      Past Surgical History:   Procedure Laterality Date   • CHOLECYSTECTOMY     • COLONOSCOPY W/ POLYPECTOMY  02/08/2012    Pedunculated polyp cecal pit, Hyperplastic polyp at 15 cm repeat exam in 5 years   • KNEE SURGERY Right    • MASTECTOMY W/ SENTINEL NODE BIOPSY Bilateral 2/12/2019    Procedure: BILATERAL MASTECTOMY WITH RIGHT BREAST SENTINEL LYMPH NODE BIOPSY - RADIOLOGIST WILL INJECT;  Surgeon: Michael Hodges MD;  Location: [facility] OR;  Service: General   • OVARIAN CYST DRAINAGE Left    • TUMOR REMOVAL Left        Review of Systems   Constitutional: Negative.    HENT: Negative.    Eyes: Negative.    Respiratory: Negative.    Cardiovascular: Negative.    Gastrointestinal: Negative.    Endocrine: Negative.    Genitourinary: Negative.    Musculoskeletal: Negative.    Allergic/Immunologic: Negative.    Neurological: Negative.    Hematological: Negative.    Psychiatric/Behavioral: Negative.        Objective  /80   Pulse 64   Resp 16   Ht 154.9 cm (61\")   Wt 96.6 kg (213 lb)   SpO2 97%   BMI 40.25 kg/m²   Physical Exam   Constitutional: She is oriented to person, place, and time. She appears well-developed and well-nourished.   HENT:   Head: Normocephalic and atraumatic.   Right Ear: External ear normal.   Left Ear: External ear normal.   Nose: Nose normal.   Mouth/Throat: Oropharynx is clear and moist.   Eyes: Conjunctivae and EOM are normal. Pupils are equal, round, and reactive to light.   Neck: Normal range of motion. Neck supple.   Cardiovascular: Normal rate, regular rhythm, normal heart sounds and intact distal pulses.   Pulmonary/Chest: Effort normal and breath sounds normal.   Abdominal: Soft. Bowel sounds are normal.   Musculoskeletal: Normal range of motion.    Cee had a diabetic foot exam performed today.   During the foot exam she had a monofilament test performed.    " Neurological Sensory Findings - Unaltered hot/cold right ankle/foot discrimination.  Vascular Status -  Her right foot exhibits normal foot vasculature .  Skin Integrity  -  Her right foot skin is intact..  Neurological: She is alert and oriented to person, place, and time.   Skin: Skin is warm. Capillary refill takes less than 2 seconds.   Psychiatric: She has a normal mood and affect. Her behavior is normal. Judgment and thought content normal.   Vitals reviewed.      Assessment/Plan   Cee was seen today for follow-up.    Diagnoses and all orders for this visit:    Essential hypertension    Type 2 diabetes mellitus with stage 3 chronic kidney disease, without long-term current use of insulin (CMS/HCC)    Malignant neoplasm of female breast, unspecified estrogen receptor status, unspecified laterality, unspecified site of breast (CMS/HCC)    Acquired hypothyroidism    we discussed recent labs---faxed to nephrologist  Current outpatient and discharge medications have been reconciled for the patient.  Reviewed by: Neville Sandoval MD      Patient's Body mass index is 40.25 kg/m². BMI is above normal parameters. Recommendations include: nutrition counseling.       No orders of the defined types were placed in this encounter.      Follow up: 6 month(s)

## 2019-11-19 ENCOUNTER — OFFICE VISIT (OUTPATIENT)
Dept: ONCOLOGY | Facility: CLINIC | Age: 74
End: 2019-11-19

## 2019-11-19 VITALS
SYSTOLIC BLOOD PRESSURE: 142 MMHG | BODY MASS INDEX: 40.4 KG/M2 | HEIGHT: 61 IN | HEART RATE: 76 BPM | OXYGEN SATURATION: 98 % | DIASTOLIC BLOOD PRESSURE: 94 MMHG | TEMPERATURE: 97.8 F | RESPIRATION RATE: 16 BRPM | WEIGHT: 214 LBS

## 2019-11-19 DIAGNOSIS — C50.411 MALIGNANT NEOPLASM OF UPPER-OUTER QUADRANT OF RIGHT BREAST IN FEMALE, ESTROGEN RECEPTOR POSITIVE (HCC): Primary | ICD-10-CM

## 2019-11-19 DIAGNOSIS — Z17.0 MALIGNANT NEOPLASM OF UPPER-OUTER QUADRANT OF RIGHT BREAST IN FEMALE, ESTROGEN RECEPTOR POSITIVE (HCC): Primary | ICD-10-CM

## 2019-11-19 PROCEDURE — 99214 OFFICE O/P EST MOD 30 MIN: CPT | Performed by: INTERNAL MEDICINE

## 2020-01-06 RX ORDER — LETROZOLE 2.5 MG/1
2.5 TABLET, FILM COATED ORAL DAILY
Qty: 90 TABLET | Refills: 3 | Status: SHIPPED | OUTPATIENT
Start: 2020-01-06 | End: 2020-11-30

## 2020-01-06 RX ORDER — VALSARTAN 320 MG/1
320 TABLET ORAL DAILY
Qty: 90 TABLET | Refills: 1 | Status: SHIPPED | OUTPATIENT
Start: 2020-01-06 | End: 2020-01-17 | Stop reason: SDUPTHER

## 2020-01-06 RX ORDER — ATORVASTATIN CALCIUM 10 MG/1
TABLET, FILM COATED ORAL
Qty: 90 TABLET | Refills: 1 | Status: SHIPPED | OUTPATIENT
Start: 2020-01-06 | End: 2020-01-17 | Stop reason: SDUPTHER

## 2020-01-17 RX ORDER — HYDROCHLOROTHIAZIDE 12.5 MG/1
12.5 TABLET ORAL DAILY
Qty: 90 TABLET | Refills: 1 | Status: SHIPPED | OUTPATIENT
Start: 2020-01-17 | End: 2020-04-22

## 2020-01-17 RX ORDER — ATORVASTATIN CALCIUM 10 MG/1
10 TABLET, FILM COATED ORAL DAILY
Qty: 90 TABLET | Refills: 1 | Status: SHIPPED | OUTPATIENT
Start: 2020-01-17 | End: 2020-09-09

## 2020-01-17 RX ORDER — GLIPIZIDE 2.5 MG/1
2.5 TABLET, EXTENDED RELEASE ORAL DAILY
Qty: 90 TABLET | Refills: 1 | Status: SHIPPED | OUTPATIENT
Start: 2020-01-17 | End: 2020-05-18 | Stop reason: SDUPTHER

## 2020-01-17 RX ORDER — LEVOTHYROXINE SODIUM 0.1 MG/1
100 TABLET ORAL DAILY
Qty: 90 TABLET | Refills: 1 | Status: SHIPPED | OUTPATIENT
Start: 2020-01-17 | End: 2020-05-18 | Stop reason: SDUPTHER

## 2020-01-17 RX ORDER — VALSARTAN 320 MG/1
320 TABLET ORAL DAILY
Qty: 90 TABLET | Refills: 1 | Status: SHIPPED | OUTPATIENT
Start: 2020-01-17 | End: 2020-08-03

## 2020-01-17 RX ORDER — NEBIVOLOL 20 MG/1
20 TABLET ORAL DAILY
Qty: 90 TABLET | Refills: 1 | Status: SHIPPED | OUTPATIENT
Start: 2020-01-17 | End: 2020-05-18 | Stop reason: SDUPTHER

## 2020-03-10 ENCOUNTER — LAB (OUTPATIENT)
Dept: LAB | Facility: HOSPITAL | Age: 75
End: 2020-03-10

## 2020-03-10 DIAGNOSIS — Z17.0 MALIGNANT NEOPLASM OF UPPER-OUTER QUADRANT OF RIGHT BREAST IN FEMALE, ESTROGEN RECEPTOR POSITIVE (HCC): ICD-10-CM

## 2020-03-10 DIAGNOSIS — C50.411 MALIGNANT NEOPLASM OF UPPER-OUTER QUADRANT OF RIGHT BREAST IN FEMALE, ESTROGEN RECEPTOR POSITIVE (HCC): ICD-10-CM

## 2020-03-10 LAB
ALBUMIN SERPL-MCNC: 3.9 G/DL (ref 3.5–5.2)
ALBUMIN/GLOB SERPL: 1.3 G/DL
ALP SERPL-CCNC: 69 U/L (ref 39–117)
ALT SERPL W P-5'-P-CCNC: 13 U/L (ref 1–33)
ANION GAP SERPL CALCULATED.3IONS-SCNC: 10 MMOL/L (ref 5–15)
AST SERPL-CCNC: 19 U/L (ref 1–32)
BASOPHILS # BLD AUTO: 0.03 10*3/MM3 (ref 0–0.2)
BASOPHILS NFR BLD AUTO: 0.4 % (ref 0–1.5)
BILIRUB SERPL-MCNC: 0.4 MG/DL (ref 0.2–1.2)
BUN BLD-MCNC: 17 MG/DL (ref 8–23)
BUN/CREAT SERPL: 14 (ref 7–25)
CALCIUM SPEC-SCNC: 9.4 MG/DL (ref 8.6–10.5)
CEA SERPL-MCNC: 1.48 NG/ML
CHLORIDE SERPL-SCNC: 103 MMOL/L (ref 98–107)
CO2 SERPL-SCNC: 25 MMOL/L (ref 22–29)
CREAT BLD-MCNC: 1.21 MG/DL (ref 0.57–1)
DEPRECATED RDW RBC AUTO: 41.4 FL (ref 37–54)
EOSINOPHIL # BLD AUTO: 0.35 10*3/MM3 (ref 0–0.4)
EOSINOPHIL NFR BLD AUTO: 4.9 % (ref 0.3–6.2)
ERYTHROCYTE [DISTWIDTH] IN BLOOD BY AUTOMATED COUNT: 13.6 % (ref 12.3–15.4)
GFR SERPL CREATININE-BSD FRML MDRD: 44 ML/MIN/1.73
GLOBULIN UR ELPH-MCNC: 3 GM/DL
GLUCOSE BLD-MCNC: 110 MG/DL (ref 65–99)
HCT VFR BLD AUTO: 31.6 % (ref 34–46.6)
HGB BLD-MCNC: 11 G/DL (ref 12–15.9)
IMM GRANULOCYTES # BLD AUTO: 0.06 10*3/MM3 (ref 0–0.05)
IMM GRANULOCYTES NFR BLD AUTO: 0.8 % (ref 0–0.5)
LYMPHOCYTES # BLD AUTO: 1.58 10*3/MM3 (ref 0.7–3.1)
LYMPHOCYTES NFR BLD AUTO: 22.3 % (ref 19.6–45.3)
MCH RBC QN AUTO: 29.3 PG (ref 26.6–33)
MCHC RBC AUTO-ENTMCNC: 34.8 G/DL (ref 31.5–35.7)
MCV RBC AUTO: 84 FL (ref 79–97)
MONOCYTES # BLD AUTO: 0.57 10*3/MM3 (ref 0.1–0.9)
MONOCYTES NFR BLD AUTO: 8 % (ref 5–12)
NEUTROPHILS # BLD AUTO: 4.51 10*3/MM3 (ref 1.7–7)
NEUTROPHILS NFR BLD AUTO: 63.6 % (ref 42.7–76)
NRBC BLD AUTO-RTO: 0 /100 WBC (ref 0–0.2)
PLATELET # BLD AUTO: 239 10*3/MM3 (ref 140–450)
PMV BLD AUTO: 8.8 FL (ref 6–12)
POTASSIUM BLD-SCNC: 4.4 MMOL/L (ref 3.5–5.2)
PROT SERPL-MCNC: 6.9 G/DL (ref 6–8.5)
RBC # BLD AUTO: 3.76 10*6/MM3 (ref 3.77–5.28)
SODIUM BLD-SCNC: 138 MMOL/L (ref 136–145)
WBC NRBC COR # BLD: 7.1 10*3/MM3 (ref 3.4–10.8)

## 2020-03-10 PROCEDURE — 36415 COLL VENOUS BLD VENIPUNCTURE: CPT

## 2020-03-10 PROCEDURE — 82378 CARCINOEMBRYONIC ANTIGEN: CPT

## 2020-03-10 PROCEDURE — 85025 COMPLETE CBC W/AUTO DIFF WBC: CPT

## 2020-03-10 PROCEDURE — 80053 COMPREHEN METABOLIC PANEL: CPT

## 2020-03-10 PROCEDURE — 86300 IMMUNOASSAY TUMOR CA 15-3: CPT

## 2020-03-11 LAB — CANCER AG27-29 SERPL-ACNC: 13.8 U/ML (ref 0–38.6)

## 2020-04-02 RX ORDER — HYDROCHLOROTHIAZIDE 25 MG/1
TABLET ORAL
Qty: 90 TABLET | Refills: 1 | Status: SHIPPED | OUTPATIENT
Start: 2020-04-02 | End: 2020-04-22

## 2020-04-03 ENCOUNTER — TELEPHONE (OUTPATIENT)
Dept: FAMILY MEDICINE CLINIC | Facility: CLINIC | Age: 75
End: 2020-04-03

## 2020-04-03 NOTE — TELEPHONE ENCOUNTER
Received a fax from pharmacy needing clarification on HCTZ.  25mg is listed in the chart as well as 12.5mg.   On the patient's las OV the dose listed is 25mg.  But I cannot find a note to change the dose to 12.5mg.   What dose do you want her to have?

## 2020-04-15 ENCOUNTER — TELEPHONE (OUTPATIENT)
Dept: ONCOLOGY | Facility: CLINIC | Age: 75
End: 2020-04-15

## 2020-04-15 ENCOUNTER — LAB (OUTPATIENT)
Dept: LAB | Facility: HOSPITAL | Age: 75
End: 2020-04-15

## 2020-04-15 DIAGNOSIS — Z17.0 MALIGNANT NEOPLASM OF RIGHT BREAST IN FEMALE, ESTROGEN RECEPTOR POSITIVE, UNSPECIFIED SITE OF BREAST (HCC): Primary | ICD-10-CM

## 2020-04-15 DIAGNOSIS — Z17.0 MALIGNANT NEOPLASM OF RIGHT BREAST IN FEMALE, ESTROGEN RECEPTOR POSITIVE, UNSPECIFIED SITE OF BREAST (HCC): ICD-10-CM

## 2020-04-15 DIAGNOSIS — C50.911 MALIGNANT NEOPLASM OF RIGHT BREAST IN FEMALE, ESTROGEN RECEPTOR POSITIVE, UNSPECIFIED SITE OF BREAST (HCC): Primary | ICD-10-CM

## 2020-04-15 DIAGNOSIS — C50.911 MALIGNANT NEOPLASM OF RIGHT BREAST IN FEMALE, ESTROGEN RECEPTOR POSITIVE, UNSPECIFIED SITE OF BREAST (HCC): ICD-10-CM

## 2020-04-15 LAB
ALBUMIN SERPL-MCNC: 4.2 G/DL (ref 3.5–5.2)
ALBUMIN/GLOB SERPL: 1.4 G/DL
ALP SERPL-CCNC: 67 U/L (ref 39–117)
ALT SERPL W P-5'-P-CCNC: 10 U/L (ref 1–33)
ANION GAP SERPL CALCULATED.3IONS-SCNC: 13 MMOL/L (ref 5–15)
AST SERPL-CCNC: 15 U/L (ref 1–32)
BASOPHILS # BLD AUTO: 0.03 10*3/MM3 (ref 0–0.2)
BASOPHILS NFR BLD AUTO: 0.5 % (ref 0–1.5)
BILIRUB SERPL-MCNC: 0.4 MG/DL (ref 0.2–1.2)
BUN BLD-MCNC: 45 MG/DL (ref 8–23)
BUN/CREAT SERPL: 20.7 (ref 7–25)
CALCIUM SPEC-SCNC: 10.3 MG/DL (ref 8.6–10.5)
CEA SERPL-MCNC: 1.5 NG/ML
CHLORIDE SERPL-SCNC: 97 MMOL/L (ref 98–107)
CO2 SERPL-SCNC: 27 MMOL/L (ref 22–29)
CREAT BLD-MCNC: 2.17 MG/DL (ref 0.57–1)
DEPRECATED RDW RBC AUTO: 40.6 FL (ref 37–54)
EOSINOPHIL # BLD AUTO: 0.32 10*3/MM3 (ref 0–0.4)
EOSINOPHIL NFR BLD AUTO: 5 % (ref 0.3–6.2)
ERYTHROCYTE [DISTWIDTH] IN BLOOD BY AUTOMATED COUNT: 13.2 % (ref 12.3–15.4)
GFR SERPL CREATININE-BSD FRML MDRD: 22 ML/MIN/1.73
GLOBULIN UR ELPH-MCNC: 3 GM/DL
GLUCOSE BLD-MCNC: 108 MG/DL (ref 65–99)
HCT VFR BLD AUTO: 36 % (ref 34–46.6)
HGB BLD-MCNC: 11.9 G/DL (ref 12–15.9)
IMM GRANULOCYTES # BLD AUTO: 0.02 10*3/MM3 (ref 0–0.05)
IMM GRANULOCYTES NFR BLD AUTO: 0.3 % (ref 0–0.5)
LYMPHOCYTES # BLD AUTO: 1.45 10*3/MM3 (ref 0.7–3.1)
LYMPHOCYTES NFR BLD AUTO: 22.7 % (ref 19.6–45.3)
MCH RBC QN AUTO: 27.8 PG (ref 26.6–33)
MCHC RBC AUTO-ENTMCNC: 33.1 G/DL (ref 31.5–35.7)
MCV RBC AUTO: 84.1 FL (ref 79–97)
MONOCYTES # BLD AUTO: 0.5 10*3/MM3 (ref 0.1–0.9)
MONOCYTES NFR BLD AUTO: 7.8 % (ref 5–12)
NEUTROPHILS # BLD AUTO: 4.06 10*3/MM3 (ref 1.7–7)
NEUTROPHILS NFR BLD AUTO: 63.7 % (ref 42.7–76)
NRBC BLD AUTO-RTO: 0 /100 WBC (ref 0–0.2)
PLATELET # BLD AUTO: 243 10*3/MM3 (ref 140–450)
PMV BLD AUTO: 9 FL (ref 6–12)
POTASSIUM BLD-SCNC: 3.8 MMOL/L (ref 3.5–5.2)
PROT SERPL-MCNC: 7.2 G/DL (ref 6–8.5)
RBC # BLD AUTO: 4.28 10*6/MM3 (ref 3.77–5.28)
SODIUM BLD-SCNC: 137 MMOL/L (ref 136–145)
WBC NRBC COR # BLD: 6.38 10*3/MM3 (ref 3.4–10.8)

## 2020-04-15 PROCEDURE — 85025 COMPLETE CBC W/AUTO DIFF WBC: CPT

## 2020-04-15 PROCEDURE — 80053 COMPREHEN METABOLIC PANEL: CPT

## 2020-04-15 PROCEDURE — 36415 COLL VENOUS BLD VENIPUNCTURE: CPT

## 2020-04-15 PROCEDURE — 82378 CARCINOEMBRYONIC ANTIGEN: CPT | Performed by: INTERNAL MEDICINE

## 2020-04-15 PROCEDURE — 86300 IMMUNOASSAY TUMOR CA 15-3: CPT

## 2020-04-15 NOTE — PROGRESS NOTES
MGW ONC [facility]EMATOLOGY AND ONCOLOGY  [address]  [place] [address] [phone]    Patient Name: Cee Quiles  Encounter Date: 03/17/2020  YOB: 1945  Patient Number: [id number]      REASON FOR FOLLOW-UP:  Cee Quiles is a pleasant 75-year-old  female who is seen on followup for Stage IA right breast cancer, receptor positive, and HER-2/jeremy negative.  Low recurrence score of 6, absolute benefit of chemo is less than 1%.   She is on adjuvant Femara for the past 13 months.  She is seen with spouse, Jason.  She is a reliable historian.       Oncology/Hematology History    DIAGNOSTIC ABNORMALITIES:  The patient was found to have a right breast mass by mammogram at [facility].   Right breast core biopsy 01/17/2019 showed infiltrating lobular carcinoma, grade 1. Greatest dimension of tumor measured 11.1 mm. ER 98%, UT 92%, HER-2/jeremy +1 and negative FISH, and Ki-67 at 7%,. Oncotype DX report. Low recurrence score of 6, absolute benefit of chemo is less than 1%  Breast MRI [facility] 01/30/2019, 1.8 x 0.7 x 0.6 cm, non mass-like linear enhancement at 1 o'clock. Second suspicious area of enhancement in the right breast just behind the nipple measuring 1 x 1 x 0.9 cm. Two areas of linear non mass-like enhancement has anterior depth at 1 o'clock in the left breast.   The patient was seen by Dr. Hodges 02/01/2019. Planned for bilateral mastectomy and sentinel lymph node evaluation.   Ultrasound biopsy left breast showed atypical lobular hyperplasia. No histologic evidence of malignancy.   The patient had a followup with Dr. Hodges 02/13/2019.  CBC 02/13/2019 revealed a WBC of 10.6, hemoglobin 12.2, hematocrit 36.8, MCV 86.4, platelet count 211,000 with ANC of 9.4. CMP remarkable for a GFR of 46 mL/minute.  Pathology report 02/15/2019 left breast showed no evidence of in situ or invasive  carcinoma. Right breast showed an infiltrating lobular carcinoma, grade 1 with residual 8 mm.       PREVIOUS INTERVENTIONS:   Bilateral mastectomy with right breast sentinel lymph node biopsy on 02/12/2019.  Adjuvant Femara 2.5 mg p.o. daily 03/22/2019 through present.        Breast CA (CMS/[facility])    2/12/2019 Initial Diagnosis     Breast CA (CMS/[facility])         PAST MEDICAL HISTORY:  ALLERGIES:  Allergies   Allergen Reactions   • Keflex [Cephalexin] Anaphylaxis   • Lortab [Hydrocodone-Acetaminophen] Nausea Only     CURRENT MEDICATIONS:  Outpatient Encounter Medications as of 4/22/2020   Medication Sig Dispense Refill   • aspirin 325 MG tablet Take 325 mg by mouth As Needed.     • atorvastatin (LIPITOR) 10 MG tablet Take 1 tablet by mouth Daily. 90 tablet 1   • calcium citrate-vitamin d (CALCITRATE) 315-250 MG-UNIT tablet tablet Take  by mouth Daily.     • Denosumab (PROLIA SC) Inject  under the skin into the appropriate area as directed.     • furosemide (LASIX) 20 MG tablet Take 20 mg by mouth 2 (Two) Times a Day.     • glipizide (GLUCOTROL XL) 2.5 MG 24 hr tablet Take 1 tablet by mouth Daily. 90 tablet 1   • letrozole (FEMARA) 2.5 MG tablet TAKE 1 TABLET BY MOUTH  DAILY 90 tablet 3   • levothyroxine (SYNTHROID, LEVOTHROID) 100 MCG tablet Take 1 tablet by mouth Daily. 90 tablet 1   • meclizine (ANTIVERT) 25 MG tablet Take 25 mg by mouth 3 (Three) Times a Day As Needed for dizziness.     • nebivolol (BYSTOLIC) 20 MG tablet Take 1 tablet by mouth Daily. 90 tablet 1   • valsartan (DIOVAN) 320 MG tablet Take 1 tablet by mouth Daily. 90 tablet 1   • [DISCONTINUED] hydroCHLOROthiazide (HYDRODIURIL) 12.5 MG tablet Take 1 tablet by mouth Daily. 90 tablet 1   • [DISCONTINUED] hydroCHLOROthiazide (HYDRODIURIL) 25 MG tablet TAKE 1 TABLET BY MOUTH  DAILY 90 tablet 1     No facility-administered encounter medications on file as of 4/22/2020.      ADULT ILLNESSES:  Patient Active Problem List   Diagnosis Code   • Type 2 diabetes  mellitus with diabetic chronic kidney disease (CMS/[facility]) E11.22   • Essential hypertension I10   • Acquired hypothyroidism E03.9   • Chronic pain of right knee M25.561, G89.29   • CKD (chronic kidney disease) stage 3, GFR 30-59 ml/min (CMS/[facility]) N18.3   • Mixed hyperlipidemia E78.2   • Morbidly obese (CMS/[facility]) E66.01   • Breast CA (CMS/[facility]) C50.919   • Postmenopausal Z78.0   • Hypomagnesemia E83.42     SURGERIES:  Past Surgical History:   Procedure Laterality Date   • CHOLECYSTECTOMY     • COLONOSCOPY W/ POLYPECTOMY  02/08/2012    Pedunculated polyp cecal pit, Hyperplastic polyp at 15 cm repeat exam in 5 years   • KNEE SURGERY Right    • MASTECTOMY W/ SENTINEL NODE BIOPSY Bilateral 2/12/2019    Procedure: BILATERAL MASTECTOMY WITH RIGHT BREAST SENTINEL LYMPH NODE BIOPSY - RADIOLOGIST WILL INJECT;  Surgeon: Michael Hodges MD;  Location: [facility] OR;  Service: General   • OVARIAN CYST DRAINAGE Left    • TUMOR REMOVAL Left      HEALTH MAINTENANCE ITEMS:  Health Maintenance Due   Topic Date Due   • TDAP/TD VACCINES (1 - Tdap) 01/24/1958   • ZOSTER VACCINE (1 of 2) 01/24/1997   • HEPATITIS C SCREENING  10/21/2016   • MEDICARE ANNUAL WELLNESS  05/18/2019   • URINE MICROALBUMIN  05/18/2019   • DIABETIC EYE EXAM  11/08/2019   • PNEUMOCOCCAL VACCINE (65+ HIGH RISK) (2 of 2 - PPSV23) 01/13/2020       <no information>  Last Completed Colonoscopy       Status Date      COLONOSCOPY Done 7/31/2017 Ext Proc: [place] COLSC FLX W/RMVL OF TUMOR POLYP LESION SNARE TQ     Patient has more history with this topic...        Immunization History   Administered Date(s) Administered   • Flu Vaccine Quad PF >18YRS 11/13/2017   • Flu Vaccine Quad PF >36MO 11/13/2017   • Fluad Quad 11/18/2019   • Fluzone High Dose =>65 Years ([employer] ONLY) 10/23/2015, 10/31/2016, 10/31/2018   • Pneumococcal Conjugate 13-Valent (PCV13) 11/18/2019     Last Completed Mammogram       Status Date      MAMMOGRAM Done 12/9/2015 Ext Proc: [facility] SCREENINGMAMMOGRAPHYDIGITAL      "Patient has more history with this topic...            FAMILY HISTORY:  Family History   Problem Relation Age of Onset   • Colon cancer Neg Hx    • Colon polyps Neg Hx      SOCIAL HISTORY:  Social History     Socioeconomic History   • Marital status:      Spouse name: Not on file   • Number of children: Not on file   • Years of education: Not on file   • Highest education level: Not on file   Occupational History   • Occupation: retired   Tobacco Use   • Smoking status: Never Smoker   • Smokeless tobacco: Never Used   Substance and Sexual Activity   • Alcohol use: No   • Drug use: No   • Sexual activity: Defer       REVIEW OF SYSTEMS:    Review of Systems   Constitutional: Negative for chills, diaphoresis, fatigue and fever.        \"I feel pretty good.\" Tolerating Femara.    HENT: Negative for congestion, hearing loss, sore throat and trouble swallowing.    Eyes: Negative for redness and visual disturbance.   Respiratory: Negative for cough, shortness of breath and wheezing.    Cardiovascular: Negative for chest pain and leg swelling.   Gastrointestinal: Negative for abdominal pain, blood in stool, constipation, diarrhea, nausea and vomiting.   Endocrine: Negative for cold intolerance and heat intolerance.   Genitourinary: Negative for difficulty urinating, dysuria, flank pain, frequency, hematuria and vaginal bleeding.   Musculoskeletal: Negative for arthralgias and joint swelling.   Skin: Positive for pallor.   Allergic/Immunologic: Negative for food allergies.   Neurological: Negative for dizziness, seizures, syncope, weakness, numbness and headaches.   Hematological: Negative for adenopathy. Does not bruise/bleed easily.   Psychiatric/Behavioral: Negative for agitation, confusion and hallucinations.       VITAL SIGNS: /76   Pulse 72   Temp 97.6 °F (36.4 °C) (Temporal)   Resp 16   Ht 154.9 cm (61\")   Wt 94.7 kg (208 lb 11.2 oz)   SpO2 97%   Breastfeeding No   BMI 39.43 kg/m²   Pain Score    " 04/22/20 0807   PainSc: 0-No pain       PHYSICAL EXAMINATION:     Physical Exam   Constitutional: She is oriented to person, place, and time. She appears well-developed and well-nourished. No distress.   HENT:   Head: Normocephalic and atraumatic.   Eyes: EOM are normal. No scleral icterus.   Neck: Trachea normal and normal range of motion. Neck supple.   Cardiovascular: Normal rate, regular rhythm and normal pulses.   No murmur heard.  Pulmonary/Chest: Effort normal and breath sounds normal. She has no wheezes. She has no rhonchi. She has no rales. Chest wall is not dull to percussion.   Bilateral mastectomy scars. Examined with [place].   Abdominal: Soft. Normal appearance and bowel sounds are normal. There is no tenderness. There is no rebound and no guarding.   Musculoskeletal: She exhibits no edema.   Lymphadenopathy:     She has no cervical adenopathy.     She has no axillary adenopathy.        Right: No inguinal and no supraclavicular adenopathy present.        Left: No inguinal and no supraclavicular adenopathy present.   Neurological: She is alert and oriented to person, place, and time. She has normal strength. No sensory deficit.   Skin: Skin is warm and dry. She is not diaphoretic. There is pallor.   Psychiatric: She has a normal mood and affect. Her behavior is normal. Judgment and thought content normal.   Vitals reviewed.      LABS    Lab Results - Last 18 Months   Lab Units 04/15/20  0837 03/10/20  0808 11/13/19  0950 11/12/19  0923 07/12/19  0916 04/03/19  0840 02/13/19  0542   HEMOGLOBIN g/dL 11.9* 11.0* 12.0 12.4 12.5 12.8 12.2   HEMATOCRIT % 36.0 31.6* 35.4 35.9 36.6* 36.8* 36.8*   MCV fL 84.1 84.0 86.1 83.9 83.9 85.6 86.4   WBC 10*3/mm3 6.38 7.10 6.58 6.51 6.36 7.33 10.61   RDW % 13.2 13.6 13.6 13.4 13.2 13.8 13.4   MPV fL 9.0 8.8  --  9.1 9.0 9.0 9.2   PLATELETS 10*3/mm3 243 239 286 300 263 263 211   IMM GRAN % % 0.3 0.8*  --  0.5 0.6 1.0 0.4   NEUTROS ABS 10*3/mm3 4.06 4.51 3.99 4.62 4.12 5.38  9.46*   LYMPHS ABS 10*3/mm3 1.45 1.58 1.75 1.19 1.36 1.33 0.48*   MONOS ABS 10*3/mm3 0.50 0.57 0.54 0.43 0.45 0.51 0.62   EOS ABS 10*3/mm3 0.32 0.35 0.22 0.20 0.36 0.00 0.00   BASOS ABS 10*3/mm3 0.03 0.03 0.05 0.04 0.03 0.04 0.01   IMMATURE GRANS (ABS) 10*3/mm3 0.02 0.06*  --  0.03 0.04 0.07* 0.04   NRBC /100 WBC 0.0 0.0 0.0 0.0 0.0 0.0 0.0       Lab Results - Last 18 Months   Lab Units 04/15/20  0837 03/10/20  0808 11/13/19  0950 11/12/19  0923 10/02/19  0906 07/12/19  0916 04/03/19  0840   GLUCOSE mg/dL 108* 110*  --  124* 134* 109* 102*   SODIUM mmol/L 137 138 135* 138 135* 136 140   POTASSIUM mmol/L 3.8 4.4 4.5 4.0 4.0 3.8 3.9   TOTAL CO2 mmol/L  --   --  26.6  --   --   --   --    CO2 mmol/L 27.0 25.0  --  27.0 27.0 27.0 28.0   CHLORIDE mmol/L 97* 103 98 100 96* 99 100   ANION GAP mmol/L 13.0 10.0  --  11.0 12.0 10.0 12.0   CREATININE mg/dL 2.17* 1.21* 1.34* 1.53* 1.21* 1.33 1.30   BUN mg/dL 45* 17 28* 36* 24* 25* 26*   BUN / CREAT RATIO  20.7 14.0 20.9 23.5 19.8 18.8 20.0   CALCIUM mg/dL 10.3 9.4 9.1 8.9 9.6 10.0 9.4   EGFR IF NONAFRICN AM mL/min/1.73 22* 44* 39* 33* 44* 39* 40*   ALK PHOS U/L 67 69 50 58 63 73 106   TOTAL PROTEIN g/dL 7.2 6.9  --  7.3 7.3 7.1 7.3   ALT (SGPT) U/L 10 13 9 8 12 <15 <15   AST (SGOT) U/L 15 19 12 15 13 22 21   BILIRUBIN mg/dL 0.4 0.4 0.6 0.4 0.5 0.6 0.6   ALBUMIN g/dL 4.20 3.90 4.30 4.20 4.00 4.00 4.20   GLOBULIN gm/dL 3.0 3.0  --  3.1 3.3 3.1 3.1       Lab Results - Last 18 Months   Lab Units 04/15/20  0837 03/10/20  0808 11/12/19  0923 07/12/19  0916 04/03/19  0840 03/07/19  0900   CEA ng/mL 1.50 1.48 1.51 1.71 1.37 1.08       Lab Results - Last 18 Months   Lab Units 11/13/19  0950 04/30/19  0754   TSH uIU/mL 1.970 3.740       Cee Quiles reports a pain score of 0.     Patient's Body mass index is 39.43 kg/m². BMI is above normal parameters. Recommendations include: referral to primary care.    ASSESSMENT:  1.    Carcinoma of the right breast, infiltrating lobular.  12  o'clock. Low recurrence score of 6, absolute benefit of chemo is less than 1%:  Current Stage: AJCC Stage IA (T1c N0, M0, G1)   Tumor size 1.91 cm.  Hormone Status: ER 98%, MS 92%, HER-2/jeremy negative by FISH.  Baseline Node Status: 0/2 positive.  Treatment status: Post bilateral mastectomy and right sentinel node biopsy.  On adjuvant Femara 2.5 mg 03/22/2019.  Plan for 10 years.   2.   Normocytic anemia from chronic kidney disease Stage IV, GFR 22 mL/minute.   3.   Chronic kidney disease Stage IV, GFR 22 mL/minute 04/15/2020.  4.   Osteopenia. On Prolia.  5.   Performance status of 0.   6.   Obesity BMI 39.3.          PLAN:  1.     Re:  Good tolerance to adjuvant Femara.   2.     Re:  Heme status.  Hemoglobin 11.9 gm.  3.     Re:  Pre-office CMP.  GFR 22 mL/minute.  4.     Re:  Pre-office CEA.  Normal at 1.5.  5.     Re:  Pre-office CA27-29.  Normal at 20.3.  6.    Procrit 40,000 units subcutaneous weekly if hemoglobin below 10 and hematocrit below 30 to target a hemoglobin of of 11 and hematocrit of 33.  Move CBC with differential weekly if she starts Procrit.  Observe for adverse reaction especially hypertension.  7.    Schedule Prolia 60 mg subcutaneously every 6 months.  Observe for osteonecrosis of the jaw.  8.    eRx for Femara 2.5 mg p.o. daily, 90, 3 refills if needed.  Stop and call if intolerant.  Observe for potential bone loss, thrombosis, and hot flashes.  Plan for 10 years.  High benefit for extended therapy.   9.    CBC with differential, ferritin, and iron panel every 4 weeks.   10.  Continue ongoing management per primary care physician and other specialists.  11.  Plan of care discussed with patient.  Understanding expressed.  Patient agreeable to proceed.  12.  Advance Care Planning   ACP discussion was declined by the patient. Patient does not have an advance directive, information provided.  13.  Continue currently identified medications.  14.  Refer to PCP for  obesity.  15.  Next bone density 03/2021.   16.  Blood for ferritin, iron panel, reticulocyte, B12, folate, and erythropoietin today for anemia.  17.  Stool for occult blood x3.  18.  Return to office in 4 months with pre-office CBC with differential, CEA, CA27-29, and CMP.         I spent 29 total minutes, face-to-face, caring for Cee today.  Greater than 50% of this time involved counseling and/or coordination of care as documented within this note regarding the patient's illness(es), pros and cons of various treatment options, instructions and/or risk reduction.             cc: MD Jimenez Jones MD Thomas Staton, MD

## 2020-04-15 NOTE — TELEPHONE ENCOUNTER
----- Message from Andrea Cartwright MD sent at 4/15/2020  9:20 AM CDT -----  Fax CMP to Dr. Coffman.

## 2020-04-16 LAB — CANCER AG27-29 SERPL-ACNC: 20.3 U/ML (ref 0–38.6)

## 2020-04-22 ENCOUNTER — OFFICE VISIT (OUTPATIENT)
Dept: ONCOLOGY | Facility: CLINIC | Age: 75
End: 2020-04-22

## 2020-04-22 ENCOUNTER — TELEPHONE (OUTPATIENT)
Dept: ONCOLOGY | Facility: CLINIC | Age: 75
End: 2020-04-22

## 2020-04-22 ENCOUNTER — LAB (OUTPATIENT)
Dept: LAB | Facility: HOSPITAL | Age: 75
End: 2020-04-22

## 2020-04-22 VITALS
WEIGHT: 208.7 LBS | BODY MASS INDEX: 39.4 KG/M2 | RESPIRATION RATE: 16 BRPM | HEART RATE: 72 BPM | DIASTOLIC BLOOD PRESSURE: 76 MMHG | OXYGEN SATURATION: 97 % | TEMPERATURE: 97.6 F | HEIGHT: 61 IN | SYSTOLIC BLOOD PRESSURE: 112 MMHG

## 2020-04-22 DIAGNOSIS — Z17.0 MALIGNANT NEOPLASM OF UPPER-OUTER QUADRANT OF RIGHT BREAST IN FEMALE, ESTROGEN RECEPTOR POSITIVE (HCC): ICD-10-CM

## 2020-04-22 DIAGNOSIS — N18.30 CKD (CHRONIC KIDNEY DISEASE) STAGE 3, GFR 30-59 ML/MIN (HCC): ICD-10-CM

## 2020-04-22 DIAGNOSIS — C50.411 MALIGNANT NEOPLASM OF UPPER-OUTER QUADRANT OF RIGHT BREAST IN FEMALE, ESTROGEN RECEPTOR POSITIVE (HCC): ICD-10-CM

## 2020-04-22 DIAGNOSIS — D63.1 ANEMIA DUE TO STAGE 4 CHRONIC KIDNEY DISEASE (HCC): ICD-10-CM

## 2020-04-22 DIAGNOSIS — N18.30 CKD (CHRONIC KIDNEY DISEASE) STAGE 3, GFR 30-59 ML/MIN (HCC): Primary | ICD-10-CM

## 2020-04-22 DIAGNOSIS — N18.4 ANEMIA DUE TO STAGE 4 CHRONIC KIDNEY DISEASE (HCC): ICD-10-CM

## 2020-04-22 LAB
BASOPHILS # BLD AUTO: 0.05 10*3/MM3 (ref 0–0.2)
BASOPHILS NFR BLD AUTO: 0.7 % (ref 0–1.5)
DEPRECATED RDW RBC AUTO: 39.3 FL (ref 37–54)
EOSINOPHIL # BLD AUTO: 0.28 10*3/MM3 (ref 0–0.4)
EOSINOPHIL NFR BLD AUTO: 4 % (ref 0.3–6.2)
ERYTHROCYTE [DISTWIDTH] IN BLOOD BY AUTOMATED COUNT: 13.2 % (ref 12.3–15.4)
FERRITIN SERPL-MCNC: 153.3 NG/ML (ref 13–150)
FOLATE SERPL-MCNC: 7.92 NG/ML (ref 4.78–24.2)
HCT VFR BLD AUTO: 34.9 % (ref 34–46.6)
HGB BLD-MCNC: 11.8 G/DL (ref 12–15.9)
IMM GRANULOCYTES # BLD AUTO: 0.04 10*3/MM3 (ref 0–0.05)
IMM GRANULOCYTES NFR BLD AUTO: 0.6 % (ref 0–0.5)
IRON 24H UR-MRATE: 52 MCG/DL (ref 37–145)
IRON SATN MFR SERPL: 15 % (ref 20–50)
LYMPHOCYTES # BLD AUTO: 1.44 10*3/MM3 (ref 0.7–3.1)
LYMPHOCYTES NFR BLD AUTO: 20.5 % (ref 19.6–45.3)
MCH RBC QN AUTO: 28 PG (ref 26.6–33)
MCHC RBC AUTO-ENTMCNC: 33.8 G/DL (ref 31.5–35.7)
MCV RBC AUTO: 82.7 FL (ref 79–97)
MONOCYTES # BLD AUTO: 0.52 10*3/MM3 (ref 0.1–0.9)
MONOCYTES NFR BLD AUTO: 7.4 % (ref 5–12)
NEUTROPHILS # BLD AUTO: 4.68 10*3/MM3 (ref 1.7–7)
NEUTROPHILS NFR BLD AUTO: 66.8 % (ref 42.7–76)
NRBC BLD AUTO-RTO: 0 /100 WBC (ref 0–0.2)
PLATELET # BLD AUTO: 275 10*3/MM3 (ref 140–450)
PMV BLD AUTO: 9 FL (ref 6–12)
RBC # BLD AUTO: 4.22 10*6/MM3 (ref 3.77–5.28)
RETICS # AUTO: 0.06 10*6/MM3 (ref 0.02–0.13)
RETICS/RBC NFR AUTO: 1.43 % (ref 0.7–1.9)
TIBC SERPL-MCNC: 352 MCG/DL (ref 298–536)
TRANSFERRIN SERPL-MCNC: 236 MG/DL (ref 200–360)
VIT B12 BLD-MCNC: 302 PG/ML (ref 211–946)
WBC NRBC COR # BLD: 7.01 10*3/MM3 (ref 3.4–10.8)

## 2020-04-22 PROCEDURE — 36415 COLL VENOUS BLD VENIPUNCTURE: CPT | Performed by: INTERNAL MEDICINE

## 2020-04-22 PROCEDURE — 82607 VITAMIN B-12: CPT | Performed by: INTERNAL MEDICINE

## 2020-04-22 PROCEDURE — 84466 ASSAY OF TRANSFERRIN: CPT

## 2020-04-22 PROCEDURE — 83540 ASSAY OF IRON: CPT

## 2020-04-22 PROCEDURE — 99214 OFFICE O/P EST MOD 30 MIN: CPT | Performed by: INTERNAL MEDICINE

## 2020-04-22 PROCEDURE — 85025 COMPLETE CBC W/AUTO DIFF WBC: CPT

## 2020-04-22 PROCEDURE — 85045 AUTOMATED RETICULOCYTE COUNT: CPT | Performed by: INTERNAL MEDICINE

## 2020-04-22 PROCEDURE — 82668 ASSAY OF ERYTHROPOIETIN: CPT | Performed by: INTERNAL MEDICINE

## 2020-04-22 PROCEDURE — 82728 ASSAY OF FERRITIN: CPT

## 2020-04-22 PROCEDURE — 82746 ASSAY OF FOLIC ACID SERUM: CPT | Performed by: INTERNAL MEDICINE

## 2020-04-22 RX ORDER — FUROSEMIDE 20 MG/1
20 TABLET ORAL 2 TIMES DAILY
COMMUNITY

## 2020-04-22 RX ORDER — FERROUS SULFATE 325(65) MG
325 TABLET ORAL
Qty: 60 TABLET | Refills: 2 | Status: SHIPPED | OUTPATIENT
Start: 2020-04-22 | End: 2020-09-18

## 2020-04-22 NOTE — TELEPHONE ENCOUNTER
Contacted patient regarding her low iron sat 15%, Dr Cartwright requesting prescription of oral iron tabs be prescribed, this to help bring her iron stores back up into acceptable rand. She v/u. We also discussed possible side effects of dark/black stools. Also GI upset can some times occur, if this happens she will need to d/c use of the tablets and call and let us know. She v/u of these instructions and requested script to be called to [facility].

## 2020-04-22 NOTE — TELEPHONE ENCOUNTER
----- Message from Andrea Cartwright MD sent at 4/22/2020  9:29 AM CDT -----  eRx ferrous sulfate 325 mg p.o. daily #60 with 2 refills.  Stop and call if intolerant.

## 2020-04-23 DIAGNOSIS — Z79.811 LONG TERM (CURRENT) USE OF AROMATASE INHIBITORS: ICD-10-CM

## 2020-04-23 DIAGNOSIS — C50.211 MALIGNANT NEOPLASM OF UPPER-INNER QUADRANT OF RIGHT BREAST IN FEMALE, ESTROGEN RECEPTOR POSITIVE (HCC): ICD-10-CM

## 2020-04-23 DIAGNOSIS — M85.851 OSTEOPENIA OF NECK OF RIGHT FEMUR: ICD-10-CM

## 2020-04-23 DIAGNOSIS — Z17.0 MALIGNANT NEOPLASM OF UPPER-INNER QUADRANT OF RIGHT BREAST IN FEMALE, ESTROGEN RECEPTOR POSITIVE (HCC): ICD-10-CM

## 2020-04-23 PROBLEM — M85.859 OSTEOPENIA OF NECK OF FEMUR: Status: ACTIVE | Noted: 2020-04-23

## 2020-04-23 LAB — ETHNIC BACKGROUND STATED: 16.9 MIU/ML (ref 2.6–18.5)

## 2020-04-24 ENCOUNTER — INFUSION (OUTPATIENT)
Dept: ONCOLOGY | Facility: HOSPITAL | Age: 75
End: 2020-04-24

## 2020-04-24 ENCOUNTER — APPOINTMENT (OUTPATIENT)
Dept: LAB | Facility: HOSPITAL | Age: 75
End: 2020-04-24

## 2020-04-24 VITALS
OXYGEN SATURATION: 94 % | BODY MASS INDEX: 39.46 KG/M2 | SYSTOLIC BLOOD PRESSURE: 164 MMHG | HEART RATE: 70 BPM | RESPIRATION RATE: 20 BRPM | HEIGHT: 61 IN | WEIGHT: 209 LBS | DIASTOLIC BLOOD PRESSURE: 81 MMHG | TEMPERATURE: 98.4 F

## 2020-04-24 DIAGNOSIS — C50.211 MALIGNANT NEOPLASM OF UPPER-INNER QUADRANT OF RIGHT BREAST IN FEMALE, ESTROGEN RECEPTOR POSITIVE (HCC): ICD-10-CM

## 2020-04-24 DIAGNOSIS — M85.851 OSTEOPENIA OF NECK OF RIGHT FEMUR: Primary | ICD-10-CM

## 2020-04-24 DIAGNOSIS — Z79.811 LONG TERM (CURRENT) USE OF AROMATASE INHIBITORS: ICD-10-CM

## 2020-04-24 DIAGNOSIS — Z17.0 MALIGNANT NEOPLASM OF UPPER-INNER QUADRANT OF RIGHT BREAST IN FEMALE, ESTROGEN RECEPTOR POSITIVE (HCC): ICD-10-CM

## 2020-04-24 LAB
COLLECT DATE SP2 STL: NORMAL
COLLECT DATE SP3 STL: NORMAL
COLLECT DATE STL: NORMAL
HEMOCCULT STL QL: NEGATIVE
Lab: 1045
Lab: 1920
Lab: 1955

## 2020-04-24 PROCEDURE — 96372 THER/PROPH/DIAG INJ SC/IM: CPT

## 2020-04-24 PROCEDURE — 82270 OCCULT BLOOD FECES: CPT | Performed by: INTERNAL MEDICINE

## 2020-04-24 PROCEDURE — 25010000002 DENOSUMAB 60 MG/ML SOLUTION PREFILLED SYRINGE: Performed by: INTERNAL MEDICINE

## 2020-04-24 RX ADMIN — DENOSUMAB 60 MG: 60 INJECTION SUBCUTANEOUS at 10:19

## 2020-05-18 ENCOUNTER — OFFICE VISIT (OUTPATIENT)
Dept: FAMILY MEDICINE CLINIC | Facility: CLINIC | Age: 75
End: 2020-05-18

## 2020-05-18 VITALS
BODY MASS INDEX: 39.27 KG/M2 | HEIGHT: 61 IN | DIASTOLIC BLOOD PRESSURE: 80 MMHG | SYSTOLIC BLOOD PRESSURE: 138 MMHG | WEIGHT: 208 LBS

## 2020-05-18 DIAGNOSIS — E78.2 MIXED HYPERLIPIDEMIA: ICD-10-CM

## 2020-05-18 DIAGNOSIS — N18.30 TYPE 2 DIABETES MELLITUS WITH STAGE 3 CHRONIC KIDNEY DISEASE, WITHOUT LONG-TERM CURRENT USE OF INSULIN (HCC): ICD-10-CM

## 2020-05-18 DIAGNOSIS — I10 ESSENTIAL HYPERTENSION: Primary | ICD-10-CM

## 2020-05-18 DIAGNOSIS — E11.22 TYPE 2 DIABETES MELLITUS WITH STAGE 3 CHRONIC KIDNEY DISEASE, WITHOUT LONG-TERM CURRENT USE OF INSULIN (HCC): ICD-10-CM

## 2020-05-18 DIAGNOSIS — E03.9 ACQUIRED HYPOTHYROIDISM: ICD-10-CM

## 2020-05-18 PROCEDURE — 99422 OL DIG E/M SVC 11-20 MIN: CPT | Performed by: FAMILY MEDICINE

## 2020-05-18 RX ORDER — NEBIVOLOL 20 MG/1
20 TABLET ORAL DAILY
Qty: 90 TABLET | Refills: 1 | Status: SHIPPED | OUTPATIENT
Start: 2020-05-18 | End: 2020-11-23

## 2020-05-18 RX ORDER — LEVOTHYROXINE SODIUM 0.1 MG/1
100 TABLET ORAL DAILY
Qty: 90 TABLET | Refills: 1 | Status: SHIPPED | OUTPATIENT
Start: 2020-05-18 | End: 2020-11-23

## 2020-05-18 RX ORDER — GLIPIZIDE 2.5 MG/1
2.5 TABLET, EXTENDED RELEASE ORAL DAILY
Qty: 90 TABLET | Refills: 1 | Status: SHIPPED | OUTPATIENT
Start: 2020-05-18 | End: 2020-11-23

## 2020-05-18 NOTE — PROGRESS NOTES
Subjective   Cee Quiles is a 75 y.o. female.   You have chosen to receive care through a telephone visit. Do you consent to use a telephone visit for your medical care today? Yes  Chief Complaint   Patient presents with   • Follow-up     6 mo   htn        History of Present Illness     she notes good bp control without cp or ha--she has not been monitoring hier bs--she is toleraign synthroid wjthout heat or cold intoalnces..she is toleraign slipitiro wituout myalgias      Current Outpatient Medications:   •  atorvastatin (LIPITOR) 10 MG tablet, Take 1 tablet by mouth Daily., Disp: 90 tablet, Rfl: 1  •  calcium citrate-vitamin d (CALCITRATE) 315-250 MG-UNIT tablet tablet, Take  by mouth Daily., Disp: , Rfl:   •  Denosumab (PROLIA SC), Inject  under the skin into the appropriate area as directed., Disp: , Rfl:   •  ferrous sulfate 325 (65 FE) MG tablet, Take 1 tablet by mouth Daily With Breakfast., Disp: 60 tablet, Rfl: 2  •  furosemide (LASIX) 20 MG tablet, Take 20 mg by mouth 2 (Two) Times a Day., Disp: , Rfl:   •  glipizide (GLUCOTROL XL) 2.5 MG 24 hr tablet, Take 1 tablet by mouth Daily., Disp: 90 tablet, Rfl: 1  •  letrozole (FEMARA) 2.5 MG tablet, TAKE 1 TABLET BY MOUTH  DAILY, Disp: 90 tablet, Rfl: 3  •  levothyroxine (SYNTHROID, LEVOTHROID) 100 MCG tablet, Take 1 tablet by mouth Daily., Disp: 90 tablet, Rfl: 1  •  meclizine (ANTIVERT) 25 MG tablet, Take 25 mg by mouth 3 (Three) Times a Day As Needed for dizziness., Disp: , Rfl:   •  nebivolol (BYSTOLIC) 20 MG tablet, Take 1 tablet by mouth Daily., Disp: 90 tablet, Rfl: 1  •  valsartan (DIOVAN) 320 MG tablet, Take 1 tablet by mouth Daily., Disp: 90 tablet, Rfl: 1  Allergies   Allergen Reactions   • Keflex [Cephalexin] Anaphylaxis   • Lortab [Hydrocodone-Acetaminophen] Nausea Only       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, left     WRIST   • Cancer (CMS/HCC)     LOBULAR RIGHT BREAST   • Chronic  "knee pain     BILATERAL   • Colon polyp    • Cyst of ovary, left    • Diabetes (CMS/HCC)    • Diabetes mellitus (CMS/HCC)    • Dizziness    • Elevated cholesterol    • Gallstones    • Hx of colonic polyps    • Hyperlipidemia    • Hypertension    • Hypertension    • Hypothyroid    • Kidney disease    • Long term (current) use of aromatase inhibitors 4/23/2020   • Malignant neoplasm of upper-inner quadrant of right breast in female, estrogen receptor positive (CMS/HCC) 4/23/2020   • Osteopenia of neck of femur 4/23/2020   • PONV (postoperative nausea and vomiting)    • Torn meniscus      Past Surgical History:   Procedure Laterality Date   • CHOLECYSTECTOMY     • COLONOSCOPY W/ POLYPECTOMY  02/08/2012    Pedunculated polyp cecal pit, Hyperplastic polyp at 15 cm repeat exam in 5 years   • KNEE SURGERY Right    • MASTECTOMY W/ SENTINEL NODE BIOPSY Bilateral 2/12/2019    Procedure: BILATERAL MASTECTOMY WITH RIGHT BREAST SENTINEL LYMPH NODE BIOPSY - RADIOLOGIST WILL INJECT;  Surgeon: Michael Hodges MD;  Location: [facility] OR;  Service: General   • OVARIAN CYST DRAINAGE Left    • TUMOR REMOVAL Left        Review of Systems   Constitutional: Negative.    HENT: Negative.    Eyes: Negative.    Respiratory: Negative.    Cardiovascular: Negative.    Gastrointestinal: Negative.    Endocrine: Negative.    Genitourinary: Negative.    Musculoskeletal: Negative.    Skin: Negative.    Allergic/Immunologic: Negative.    Neurological: Negative.    Hematological: Negative.    Psychiatric/Behavioral: Negative.        Objective  /80   Ht 154.9 cm (61\")   Wt 94.3 kg (208 lb)   BMI 39.30 kg/m²   Physical Exam    Assessment/Plan   Cee was seen today for follow-up.    Diagnoses and all orders for this visit:    Essential hypertension    Mixed hyperlipidemia    Type 2 diabetes mellitus with stage 3 chronic kidney disease, without long-term current use of insulin (CMS/HCC)    Acquired hypothyroidism        This visit has been " rescheduled as a phone visit to comply with patient safety concerns in accordance with CDC recommendations. Total time of discussion was 18  minutes.    Labs in July-she will call for appt       No orders of the defined types were placed in this encounter.      Follow up: 4 month(s)

## 2020-05-20 ENCOUNTER — LAB (OUTPATIENT)
Dept: LAB | Facility: HOSPITAL | Age: 75
End: 2020-05-20

## 2020-05-20 ENCOUNTER — TELEPHONE (OUTPATIENT)
Dept: ONCOLOGY | Facility: CLINIC | Age: 75
End: 2020-05-20

## 2020-05-20 DIAGNOSIS — N18.30 CKD (CHRONIC KIDNEY DISEASE) STAGE 3, GFR 30-59 ML/MIN (HCC): ICD-10-CM

## 2020-05-20 DIAGNOSIS — Z17.0 MALIGNANT NEOPLASM OF UPPER-OUTER QUADRANT OF RIGHT BREAST IN FEMALE, ESTROGEN RECEPTOR POSITIVE (HCC): ICD-10-CM

## 2020-05-20 DIAGNOSIS — C50.411 MALIGNANT NEOPLASM OF UPPER-OUTER QUADRANT OF RIGHT BREAST IN FEMALE, ESTROGEN RECEPTOR POSITIVE (HCC): ICD-10-CM

## 2020-05-20 DIAGNOSIS — N18.4 ANEMIA DUE TO STAGE 4 CHRONIC KIDNEY DISEASE (HCC): ICD-10-CM

## 2020-05-20 DIAGNOSIS — D63.1 ANEMIA DUE TO STAGE 4 CHRONIC KIDNEY DISEASE (HCC): ICD-10-CM

## 2020-05-20 LAB
BASOPHILS # BLD AUTO: 0.04 10*3/MM3 (ref 0–0.2)
BASOPHILS NFR BLD AUTO: 0.5 % (ref 0–1.5)
DEPRECATED RDW RBC AUTO: 42.5 FL (ref 37–54)
EOSINOPHIL # BLD AUTO: 0.28 10*3/MM3 (ref 0–0.4)
EOSINOPHIL NFR BLD AUTO: 3.8 % (ref 0.3–6.2)
ERYTHROCYTE [DISTWIDTH] IN BLOOD BY AUTOMATED COUNT: 13.7 % (ref 12.3–15.4)
FERRITIN SERPL-MCNC: 150.7 NG/ML (ref 13–150)
HCT VFR BLD AUTO: 36.5 % (ref 34–46.6)
HGB BLD-MCNC: 12.6 G/DL (ref 12–15.9)
IMM GRANULOCYTES # BLD AUTO: 0.04 10*3/MM3 (ref 0–0.05)
IMM GRANULOCYTES NFR BLD AUTO: 0.5 % (ref 0–0.5)
IRON 24H UR-MRATE: 104 MCG/DL (ref 37–145)
IRON SATN MFR SERPL: 27 % (ref 20–50)
LYMPHOCYTES # BLD AUTO: 1.64 10*3/MM3 (ref 0.7–3.1)
LYMPHOCYTES NFR BLD AUTO: 22.2 % (ref 19.6–45.3)
MCH RBC QN AUTO: 29.2 PG (ref 26.6–33)
MCHC RBC AUTO-ENTMCNC: 34.5 G/DL (ref 31.5–35.7)
MCV RBC AUTO: 84.5 FL (ref 79–97)
MONOCYTES # BLD AUTO: 0.61 10*3/MM3 (ref 0.1–0.9)
MONOCYTES NFR BLD AUTO: 8.3 % (ref 5–12)
NEUTROPHILS # BLD AUTO: 4.78 10*3/MM3 (ref 1.7–7)
NEUTROPHILS NFR BLD AUTO: 64.7 % (ref 42.7–76)
NRBC BLD AUTO-RTO: 0 /100 WBC (ref 0–0.2)
PLATELET # BLD AUTO: 262 10*3/MM3 (ref 140–450)
PMV BLD AUTO: 8.8 FL (ref 6–12)
RBC # BLD AUTO: 4.32 10*6/MM3 (ref 3.77–5.28)
TIBC SERPL-MCNC: 386 MCG/DL (ref 298–536)
TRANSFERRIN SERPL-MCNC: 259 MG/DL (ref 200–360)
WBC NRBC COR # BLD: 7.39 10*3/MM3 (ref 3.4–10.8)

## 2020-05-20 PROCEDURE — 83540 ASSAY OF IRON: CPT

## 2020-05-20 PROCEDURE — 36415 COLL VENOUS BLD VENIPUNCTURE: CPT

## 2020-05-20 PROCEDURE — 85025 COMPLETE CBC W/AUTO DIFF WBC: CPT

## 2020-05-20 PROCEDURE — 82728 ASSAY OF FERRITIN: CPT

## 2020-05-20 PROCEDURE — 84466 ASSAY OF TRANSFERRIN: CPT

## 2020-05-20 NOTE — TELEPHONE ENCOUNTER
----- Message from Andrea Cartwright MD sent at 5/20/2020  9:06 AM CDT -----  Stop oral iron.  
Contacted patient and informed her that she can d/c use of her oral iron tabs current iron sat is 27% which is up from 15% one month ago. Patient v/u of instruction and will recheck her counts next month 6/18/20, encouraged to call if she has questions or concerns. She v/u of instructions.   
Yes

## 2020-06-17 ENCOUNTER — LAB (OUTPATIENT)
Dept: LAB | Facility: HOSPITAL | Age: 75
End: 2020-06-17

## 2020-06-17 DIAGNOSIS — Z17.0 MALIGNANT NEOPLASM OF UPPER-OUTER QUADRANT OF RIGHT BREAST IN FEMALE, ESTROGEN RECEPTOR POSITIVE (HCC): ICD-10-CM

## 2020-06-17 DIAGNOSIS — N18.30 CKD (CHRONIC KIDNEY DISEASE) STAGE 3, GFR 30-59 ML/MIN (HCC): ICD-10-CM

## 2020-06-17 DIAGNOSIS — N18.4 ANEMIA DUE TO STAGE 4 CHRONIC KIDNEY DISEASE (HCC): ICD-10-CM

## 2020-06-17 DIAGNOSIS — D63.1 ANEMIA DUE TO STAGE 4 CHRONIC KIDNEY DISEASE (HCC): ICD-10-CM

## 2020-06-17 DIAGNOSIS — C50.411 MALIGNANT NEOPLASM OF UPPER-OUTER QUADRANT OF RIGHT BREAST IN FEMALE, ESTROGEN RECEPTOR POSITIVE (HCC): ICD-10-CM

## 2020-06-17 LAB
BASOPHILS # BLD AUTO: 0.03 10*3/MM3 (ref 0–0.2)
BASOPHILS NFR BLD AUTO: 0.4 % (ref 0–1.5)
DEPRECATED RDW RBC AUTO: 41.1 FL (ref 37–54)
EOSINOPHIL # BLD AUTO: 0.28 10*3/MM3 (ref 0–0.4)
EOSINOPHIL NFR BLD AUTO: 4 % (ref 0.3–6.2)
ERYTHROCYTE [DISTWIDTH] IN BLOOD BY AUTOMATED COUNT: 13.3 % (ref 12.3–15.4)
FERRITIN SERPL-MCNC: 152.1 NG/ML (ref 13–150)
HCT VFR BLD AUTO: 35.1 % (ref 34–46.6)
HGB BLD-MCNC: 12.1 G/DL (ref 12–15.9)
IMM GRANULOCYTES # BLD AUTO: 0.02 10*3/MM3 (ref 0–0.05)
IMM GRANULOCYTES NFR BLD AUTO: 0.3 % (ref 0–0.5)
IRON 24H UR-MRATE: 61 MCG/DL (ref 37–145)
IRON SATN MFR SERPL: 17 % (ref 20–50)
LYMPHOCYTES # BLD AUTO: 1.46 10*3/MM3 (ref 0.7–3.1)
LYMPHOCYTES NFR BLD AUTO: 20.7 % (ref 19.6–45.3)
MCH RBC QN AUTO: 28.8 PG (ref 26.6–33)
MCHC RBC AUTO-ENTMCNC: 34.5 G/DL (ref 31.5–35.7)
MCV RBC AUTO: 83.6 FL (ref 79–97)
MONOCYTES # BLD AUTO: 0.54 10*3/MM3 (ref 0.1–0.9)
MONOCYTES NFR BLD AUTO: 7.7 % (ref 5–12)
NEUTROPHILS # BLD AUTO: 4.72 10*3/MM3 (ref 1.7–7)
NEUTROPHILS NFR BLD AUTO: 66.9 % (ref 42.7–76)
NRBC BLD AUTO-RTO: 0 /100 WBC (ref 0–0.2)
PLATELET # BLD AUTO: 268 10*3/MM3 (ref 140–450)
PMV BLD AUTO: 9.2 FL (ref 6–12)
RBC # BLD AUTO: 4.2 10*6/MM3 (ref 3.77–5.28)
TIBC SERPL-MCNC: 352 MCG/DL (ref 298–536)
TRANSFERRIN SERPL-MCNC: 236 MG/DL (ref 200–360)
WBC NRBC COR # BLD: 7.05 10*3/MM3 (ref 3.4–10.8)

## 2020-06-17 PROCEDURE — 82728 ASSAY OF FERRITIN: CPT

## 2020-06-17 PROCEDURE — 85025 COMPLETE CBC W/AUTO DIFF WBC: CPT

## 2020-06-17 PROCEDURE — 84466 ASSAY OF TRANSFERRIN: CPT

## 2020-06-17 PROCEDURE — 36415 COLL VENOUS BLD VENIPUNCTURE: CPT

## 2020-06-17 PROCEDURE — 83540 ASSAY OF IRON: CPT

## 2020-07-15 ENCOUNTER — LAB (OUTPATIENT)
Dept: LAB | Facility: HOSPITAL | Age: 75
End: 2020-07-15

## 2020-07-15 DIAGNOSIS — D50.9 IRON DEFICIENCY ANEMIA, UNSPECIFIED IRON DEFICIENCY ANEMIA TYPE: Primary | ICD-10-CM

## 2020-07-15 DIAGNOSIS — N18.30 CKD (CHRONIC KIDNEY DISEASE) STAGE 3, GFR 30-59 ML/MIN (HCC): Primary | ICD-10-CM

## 2020-07-15 LAB
ALBUMIN SERPL-MCNC: 4.1 G/DL (ref 3.5–5.2)
ALBUMIN/GLOB SERPL: 1.5 G/DL
ALP SERPL-CCNC: 59 U/L (ref 39–117)
ALT SERPL W P-5'-P-CCNC: 15 U/L (ref 1–33)
ANION GAP SERPL CALCULATED.3IONS-SCNC: 17 MMOL/L (ref 5–15)
AST SERPL-CCNC: 15 U/L (ref 1–32)
BASOPHILS # BLD AUTO: 0.04 10*3/MM3 (ref 0–0.2)
BASOPHILS NFR BLD AUTO: 0.6 % (ref 0–1.5)
BILIRUB SERPL-MCNC: 0.3 MG/DL (ref 0–1.2)
BUN SERPL-MCNC: 28 MG/DL (ref 8–23)
BUN/CREAT SERPL: 20 (ref 7–25)
CALCIUM SPEC-SCNC: 10.3 MG/DL (ref 8.6–10.5)
CHLORIDE SERPL-SCNC: 97 MMOL/L (ref 98–107)
CO2 SERPL-SCNC: 24 MMOL/L (ref 22–29)
CREAT SERPL-MCNC: 1.4 MG/DL (ref 0.57–1)
DEPRECATED RDW RBC AUTO: 40.8 FL (ref 37–54)
EOSINOPHIL # BLD AUTO: 0.38 10*3/MM3 (ref 0–0.4)
EOSINOPHIL NFR BLD AUTO: 5.4 % (ref 0.3–6.2)
ERYTHROCYTE [DISTWIDTH] IN BLOOD BY AUTOMATED COUNT: 13.2 % (ref 12.3–15.4)
FERRITIN SERPL-MCNC: 113 NG/ML (ref 13–150)
GFR SERPL CREATININE-BSD FRML MDRD: 37 ML/MIN/1.73
GLOBULIN UR ELPH-MCNC: 2.8 GM/DL
GLUCOSE SERPL-MCNC: 118 MG/DL (ref 65–99)
HCT VFR BLD AUTO: 34 % (ref 34–46.6)
HGB BLD-MCNC: 11.6 G/DL (ref 12–15.9)
IMM GRANULOCYTES # BLD AUTO: 0.04 10*3/MM3 (ref 0–0.05)
IMM GRANULOCYTES NFR BLD AUTO: 0.6 % (ref 0–0.5)
IRON 24H UR-MRATE: 49 MCG/DL (ref 37–145)
IRON SATN MFR SERPL: 15 % (ref 20–50)
LYMPHOCYTES # BLD AUTO: 1.62 10*3/MM3 (ref 0.7–3.1)
LYMPHOCYTES NFR BLD AUTO: 22.8 % (ref 19.6–45.3)
MCH RBC QN AUTO: 28.9 PG (ref 26.6–33)
MCHC RBC AUTO-ENTMCNC: 34.1 G/DL (ref 31.5–35.7)
MCV RBC AUTO: 84.6 FL (ref 79–97)
MONOCYTES # BLD AUTO: 0.56 10*3/MM3 (ref 0.1–0.9)
MONOCYTES NFR BLD AUTO: 7.9 % (ref 5–12)
NEUTROPHILS NFR BLD AUTO: 4.45 10*3/MM3 (ref 1.7–7)
NEUTROPHILS NFR BLD AUTO: 62.7 % (ref 42.7–76)
NRBC BLD AUTO-RTO: 0 /100 WBC (ref 0–0.2)
PLATELET # BLD AUTO: 265 10*3/MM3 (ref 140–450)
PMV BLD AUTO: 9 FL (ref 6–12)
POTASSIUM SERPL-SCNC: 3.8 MMOL/L (ref 3.5–5.2)
PROT SERPL-MCNC: 6.9 G/DL (ref 6–8.5)
RBC # BLD AUTO: 4.02 10*6/MM3 (ref 3.77–5.28)
SODIUM SERPL-SCNC: 138 MMOL/L (ref 136–145)
TIBC SERPL-MCNC: 331 MCG/DL (ref 298–536)
TRANSFERRIN SERPL-MCNC: 222 MG/DL (ref 200–360)
WBC # BLD AUTO: 7.09 10*3/MM3 (ref 3.4–10.8)
WHOLE BLOOD HOLD SPECIMEN: NORMAL

## 2020-07-15 PROCEDURE — 83540 ASSAY OF IRON: CPT | Performed by: INTERNAL MEDICINE

## 2020-07-15 PROCEDURE — 82728 ASSAY OF FERRITIN: CPT | Performed by: INTERNAL MEDICINE

## 2020-07-15 PROCEDURE — 85025 COMPLETE CBC W/AUTO DIFF WBC: CPT | Performed by: INTERNAL MEDICINE

## 2020-07-15 PROCEDURE — 84466 ASSAY OF TRANSFERRIN: CPT | Performed by: INTERNAL MEDICINE

## 2020-07-15 PROCEDURE — 80053 COMPREHEN METABOLIC PANEL: CPT | Performed by: INTERNAL MEDICINE

## 2020-07-15 PROCEDURE — 36415 COLL VENOUS BLD VENIPUNCTURE: CPT

## 2020-07-17 ENCOUNTER — TELEPHONE (OUTPATIENT)
Dept: ONCOLOGY | Facility: CLINIC | Age: 75
End: 2020-07-17

## 2020-07-17 NOTE — TELEPHONE ENCOUNTER
Spoke with patient regarding her low iron sat @ 15% and she will need to restart her oral iron tablets once daily she will call when refills are needed

## 2020-07-17 NOTE — TELEPHONE ENCOUNTER
----- Message from Andrea Cartwright MD sent at 7/15/2020  2:21 PM CDT -----  eRx ferrous sulfate 325 mg p.o. daily #60 with 2 refills.  Stop and call if intolerant.

## 2020-07-24 RX ORDER — HYDROCHLOROTHIAZIDE 25 MG/1
TABLET ORAL
Qty: 90 TABLET | Refills: 3 | Status: SHIPPED | OUTPATIENT
Start: 2020-07-24 | End: 2020-09-18

## 2020-08-03 RX ORDER — VALSARTAN 320 MG/1
320 TABLET ORAL DAILY
Qty: 90 TABLET | Refills: 3 | Status: SHIPPED | OUTPATIENT
Start: 2020-08-03 | End: 2020-08-05

## 2020-08-05 RX ORDER — VALSARTAN 320 MG/1
320 TABLET ORAL DAILY
Qty: 90 TABLET | Refills: 3 | Status: SHIPPED | OUTPATIENT
Start: 2020-08-05 | End: 2021-05-21

## 2020-08-12 ENCOUNTER — LAB (OUTPATIENT)
Dept: LAB | Facility: HOSPITAL | Age: 75
End: 2020-08-12

## 2020-08-12 DIAGNOSIS — D63.1 ANEMIA DUE TO STAGE 4 CHRONIC KIDNEY DISEASE (HCC): ICD-10-CM

## 2020-08-12 DIAGNOSIS — Z17.0 MALIGNANT NEOPLASM OF UPPER-OUTER QUADRANT OF RIGHT BREAST IN FEMALE, ESTROGEN RECEPTOR POSITIVE (HCC): ICD-10-CM

## 2020-08-12 DIAGNOSIS — C50.211 MALIGNANT NEOPLASM OF UPPER-INNER QUADRANT OF RIGHT BREAST IN FEMALE, ESTROGEN RECEPTOR POSITIVE (HCC): Primary | ICD-10-CM

## 2020-08-12 DIAGNOSIS — Z17.0 MALIGNANT NEOPLASM OF UPPER-INNER QUADRANT OF RIGHT BREAST IN FEMALE, ESTROGEN RECEPTOR POSITIVE (HCC): Primary | ICD-10-CM

## 2020-08-12 DIAGNOSIS — C50.411 MALIGNANT NEOPLASM OF UPPER-OUTER QUADRANT OF RIGHT BREAST IN FEMALE, ESTROGEN RECEPTOR POSITIVE (HCC): ICD-10-CM

## 2020-08-12 DIAGNOSIS — N18.30 CKD (CHRONIC KIDNEY DISEASE) STAGE 3, GFR 30-59 ML/MIN (HCC): ICD-10-CM

## 2020-08-12 DIAGNOSIS — N18.4 ANEMIA DUE TO STAGE 4 CHRONIC KIDNEY DISEASE (HCC): ICD-10-CM

## 2020-08-12 LAB
ALBUMIN SERPL-MCNC: 4.2 G/DL (ref 3.5–5.2)
ALBUMIN/GLOB SERPL: 1.5 G/DL
ALP SERPL-CCNC: 65 U/L (ref 39–117)
ALT SERPL W P-5'-P-CCNC: 10 U/L (ref 1–33)
ANION GAP SERPL CALCULATED.3IONS-SCNC: 14 MMOL/L (ref 5–15)
AST SERPL-CCNC: 13 U/L (ref 1–32)
BASOPHILS # BLD AUTO: 0.04 10*3/MM3 (ref 0–0.2)
BASOPHILS NFR BLD AUTO: 0.5 % (ref 0–1.5)
BILIRUB SERPL-MCNC: 0.4 MG/DL (ref 0–1.2)
BUN SERPL-MCNC: 25 MG/DL (ref 8–23)
BUN/CREAT SERPL: 15.2 (ref 7–25)
CALCIUM SPEC-SCNC: 9.6 MG/DL (ref 8.6–10.5)
CEA SERPL-MCNC: 1.51 NG/ML
CHLORIDE SERPL-SCNC: 94 MMOL/L (ref 98–107)
CO2 SERPL-SCNC: 26 MMOL/L (ref 22–29)
CREAT SERPL-MCNC: 1.64 MG/DL (ref 0.57–1)
DEPRECATED RDW RBC AUTO: 39.7 FL (ref 37–54)
EOSINOPHIL # BLD AUTO: 0.4 10*3/MM3 (ref 0–0.4)
EOSINOPHIL NFR BLD AUTO: 5.2 % (ref 0.3–6.2)
ERYTHROCYTE [DISTWIDTH] IN BLOOD BY AUTOMATED COUNT: 12.9 % (ref 12.3–15.4)
GFR SERPL CREATININE-BSD FRML MDRD: 31 ML/MIN/1.73
GLOBULIN UR ELPH-MCNC: 2.8 GM/DL
GLUCOSE SERPL-MCNC: 109 MG/DL (ref 65–99)
HCT VFR BLD AUTO: 35.7 % (ref 34–46.6)
HGB BLD-MCNC: 12.1 G/DL (ref 12–15.9)
IMM GRANULOCYTES # BLD AUTO: 0.06 10*3/MM3 (ref 0–0.05)
IMM GRANULOCYTES NFR BLD AUTO: 0.8 % (ref 0–0.5)
LYMPHOCYTES # BLD AUTO: 1.75 10*3/MM3 (ref 0.7–3.1)
LYMPHOCYTES NFR BLD AUTO: 22.7 % (ref 19.6–45.3)
MCH RBC QN AUTO: 28.7 PG (ref 26.6–33)
MCHC RBC AUTO-ENTMCNC: 33.9 G/DL (ref 31.5–35.7)
MCV RBC AUTO: 84.8 FL (ref 79–97)
MONOCYTES # BLD AUTO: 0.57 10*3/MM3 (ref 0.1–0.9)
MONOCYTES NFR BLD AUTO: 7.4 % (ref 5–12)
NEUTROPHILS NFR BLD AUTO: 4.89 10*3/MM3 (ref 1.7–7)
NEUTROPHILS NFR BLD AUTO: 63.4 % (ref 42.7–76)
NRBC BLD AUTO-RTO: 0 /100 WBC (ref 0–0.2)
PLATELET # BLD AUTO: 257 10*3/MM3 (ref 140–450)
PMV BLD AUTO: 9 FL (ref 6–12)
POTASSIUM SERPL-SCNC: 4.1 MMOL/L (ref 3.5–5.2)
PROT SERPL-MCNC: 7 G/DL (ref 6–8.5)
RBC # BLD AUTO: 4.21 10*6/MM3 (ref 3.77–5.28)
SODIUM SERPL-SCNC: 134 MMOL/L (ref 136–145)
WBC # BLD AUTO: 7.71 10*3/MM3 (ref 3.4–10.8)

## 2020-08-12 PROCEDURE — 36415 COLL VENOUS BLD VENIPUNCTURE: CPT

## 2020-08-12 PROCEDURE — 86300 IMMUNOASSAY TUMOR CA 15-3: CPT

## 2020-08-12 PROCEDURE — 85025 COMPLETE CBC W/AUTO DIFF WBC: CPT | Performed by: INTERNAL MEDICINE

## 2020-08-12 PROCEDURE — 82378 CARCINOEMBRYONIC ANTIGEN: CPT

## 2020-08-12 PROCEDURE — 80053 COMPREHEN METABOLIC PANEL: CPT

## 2020-08-13 LAB — CANCER AG27-29 SERPL-ACNC: 27.2 U/ML (ref 0–38.6)

## 2020-08-13 NOTE — PROGRESS NOTES
[facility]EMATOLOGY AND ONCOLOGY  [address]  [place] [address] [phone]    Patient Name: Cee Quiles  Encounter Date: 08/20/2020  YOB: 1945  Patient Number: [id number]      REASON FOR FOLLOW-UP: Cee Quiles is a pleasant 75-year-old  female who is seen on followup for Stage IA right breast cancer, receptor positive, and HER-2/jeremy negative.  Low recurrence score of 6, absolute benefit of chemo is less than 1%.   She is on adjuvant Femara for the past 17 months. She is also seen for anemia from iron deficiency and chronic kidney disease.  She is back on iron for the past month.  She is seen alone.  She is a reliable historian.       Oncology/Hematology History    DIAGNOSTIC ABNORMALITIES:  The patient was found to have a right breast mass by mammogram at [facility].   Right breast core biopsy 01/17/2019 showed infiltrating lobular carcinoma, grade 1. Greatest dimension of tumor measured 11.1 mm. ER 98%, CO 92%, HER-2/jeremy +1 and negative FISH, and Ki-67 at 7%,. Oncotype DX report. Low recurrence score of 6, absolute benefit of chemo is less than 1%  Breast MRI [facility] 01/30/2019, 1.8 x 0.7 x 0.6 cm, non mass-like linear enhancement at 1 o'clock. Second suspicious area of enhancement in the right breast just behind the nipple measuring 1 x 1 x 0.9 cm. Two areas of linear non mass-like enhancement has anterior depth at 1 o'clock in the left breast.   The patient was seen by Dr. Hodges 02/01/2019. Planned for bilateral mastectomy and sentinel lymph node evaluation.   Ultrasound biopsy left breast showed atypical lobular hyperplasia. No histologic evidence of malignancy.   The patient had a followup with Dr. Hodges 02/13/2019.  CBC 02/13/2019 revealed a WBC of 10.6, hemoglobin 12.2, hematocrit 36.8, MCV 86.4, platelet count 211,000 with ANC of 9.4. CMP remarkable for a GFR of 46  mL/minute.  Pathology report 02/15/2019 left breast showed no evidence of in situ or invasive carcinoma. Right breast showed an infiltrating lobular carcinoma, grade 1 with residual 8 mm.       PREVIOUS INTERVENTIONS:   Bilateral mastectomy with right breast sentinel lymph node biopsy on 02/12/2019.  Adjuvant Femara 2.5 mg p.o. daily 03/22/2019 through present.      PREVIOUS INTERVENTIONS:  Ferrous sulfate 325 mg daily 04/22/2020 through 05/20/2020.  Resume 07/17/2020 through 08/20/2020.        Breast CA (CMS/HCC)    2/12/2019 Initial Diagnosis     Breast CA (CMS/HCC)        Malignant neoplasm of upper-inner quadrant of right breast in female, estrogen receptor positive (CMS/HCC)    4/23/2020 Initial Diagnosis     Malignant neoplasm of upper-inner quadrant of right breast in female, estrogen receptor positive (CMS/HCC)      4/24/2020 -  Chemotherapy     OP SUPPORTIVE Denosumab (Prolia) Q6M         PAST MEDICAL HISTORY:  ALLERGIES:  Allergies   Allergen Reactions   • Keflex [Cephalexin] Anaphylaxis   • Lortab [Hydrocodone-Acetaminophen] Nausea Only     CURRENT MEDICATIONS:  Outpatient Encounter Medications as of 8/20/2020   Medication Sig Dispense Refill   • atorvastatin (LIPITOR) 10 MG tablet Take 1 tablet by mouth Daily. 90 tablet 1   • calcium citrate-vitamin d (CALCITRATE) 315-250 MG-UNIT tablet tablet Take  by mouth Daily.     • Denosumab (PROLIA SC) Inject  under the skin into the appropriate area as directed.     • ferrous sulfate 325 (65 FE) MG tablet Take 1 tablet by mouth Daily With Breakfast. 60 tablet 2   • furosemide (LASIX) 20 MG tablet Take 20 mg by mouth 2 (Two) Times a Day.     • glipizide (GLUCOTROL XL) 2.5 MG 24 hr tablet Take 1 tablet by mouth Daily. 90 tablet 1   • hydroCHLOROthiazide (HYDRODIURIL) 25 MG tablet TAKE 1 TABLET BY MOUTH  DAILY 90 tablet 3   • letrozole (FEMARA) 2.5 MG tablet TAKE 1 TABLET BY MOUTH  DAILY 90 tablet 3   • levothyroxine (SYNTHROID, LEVOTHROID) 100 MCG tablet Take 1  tablet by mouth Daily. 90 tablet 1   • meclizine (ANTIVERT) 25 MG tablet Take 25 mg by mouth 3 (Three) Times a Day As Needed for dizziness.     • nebivolol (Bystolic) 20 MG tablet Take 1 tablet by mouth Daily. 90 tablet 1   • valsartan (DIOVAN) 320 MG tablet TAKE 1 TABLET BY MOUTH  DAILY 90 tablet 3     No facility-administered encounter medications on file as of 8/20/2020.      ADULT ILLNESSES:  Patient Active Problem List   Diagnosis Code   • Type 2 diabetes mellitus with diabetic chronic kidney disease (CMS/[facility]) E11.22   • Essential hypertension I10   • Acquired hypothyroidism E03.9   • Chronic pain of right knee M25.561, G89.29   • CKD (chronic kidney disease) stage 3, GFR 30-59 ml/min (CMS/[facility]) N18.3   • Mixed hyperlipidemia E78.2   • Morbidly obese (CMS/[facility]) E66.01   • Breast CA (CMS/[facility]) C50.919   • Postmenopausal Z78.0   • Hypomagnesemia E83.42   • Osteopenia of neck of femur M85.859   • Malignant neoplasm of upper-inner quadrant of right breast in female, estrogen receptor positive (CMS/[facility]) C50.211, Z17.0   • Long term (current) use of aromatase inhibitors Z79.811     SURGERIES:  Past Surgical History:   Procedure Laterality Date   • CHOLECYSTECTOMY     • COLONOSCOPY W/ POLYPECTOMY  02/08/2012    Pedunculated polyp cecal pit, Hyperplastic polyp at 15 cm repeat exam in 5 years   • KNEE SURGERY Right    • MASTECTOMY W/ SENTINEL NODE BIOPSY Bilateral 2/12/2019    Procedure: BILATERAL MASTECTOMY WITH RIGHT BREAST SENTINEL LYMPH NODE BIOPSY - RADIOLOGIST WILL INJECT;  Surgeon: Michael Hodges MD;  Location: [facility] OR;  Service: General   • OVARIAN CYST DRAINAGE Left    • TUMOR REMOVAL Left      HEALTH MAINTENANCE ITEMS:  Health Maintenance Due   Topic Date Due   • TDAP/TD VACCINES (1 - Tdap) 01/24/1958   • ZOSTER VACCINE (1 of 2) 01/24/1997   • Pneumococcal Vaccine Once at 65 Years Old  01/24/2012   • HEPATITIS C SCREENING  10/21/2016   • MEDICARE ANNUAL WELLNESS  05/18/2019   • URINE MICROALBUMIN  05/18/2019  "  • DIABETIC EYE EXAM  11/08/2019   • HEMOGLOBIN A1C  05/13/2020   • INFLUENZA VACCINE  08/01/2020       <no information>  Last Completed Colonoscopy       Status Date      COLONOSCOPY Done 7/31/2017 Ext Proc: [place] COLSC FLX W/RMVL OF TUMOR POLYP LESION SNARE TQ     Patient has more history with this topic...        Immunization History   Administered Date(s) Administered   • FLUAD TRI 65YR+ 11/18/2019   • Flu Vaccine Quad PF >18YRS 11/13/2017   • Flu Vaccine Quad PF >36MO 11/13/2017   • Fluad Quad 11/18/2019   • Fluzone High Dose =>65 Years ([employer] ONLY) 10/23/2015, 10/31/2016, 10/31/2018   • Influenza Quad Vaccine (Inpatient) 11/13/2017   • Pneumococcal Conjugate 13-Valent (PCV13) 11/18/2019     Last Completed Mammogram       Status Date      MAMMOGRAM Done 12/9/2015 Ext Proc: [place] SCREENINGMAMMOGRAPHYDIGITAL     Patient has more history with this topic...            FAMILY HISTORY:  Family History   Problem Relation Age of Onset   • Colon cancer Neg Hx    • Colon polyps Neg Hx      SOCIAL HISTORY:  Social History     Socioeconomic History   • Marital status:      Spouse name: Not on file   • Number of children: Not on file   • Years of education: Not on file   • Highest education level: Not on file   Occupational History   • Occupation: retired   Tobacco Use   • Smoking status: Never Smoker   • Smokeless tobacco: Never Used   Substance and Sexual Activity   • Alcohol use: No   • Drug use: No   • Sexual activity: Defer       REVIEW OF SYSTEMS:    Review of Systems   Constitutional: Negative for chills, diaphoresis, fatigue and fever.        \"I feel pretty good.\"  Tolerating Femara and oral iron.   HENT: Negative for congestion, hearing loss, nosebleeds and trouble swallowing.    Eyes: Negative for redness and visual disturbance.   Respiratory: Negative for shortness of breath and wheezing.    Cardiovascular: Negative for chest pain and palpitations.   Gastrointestinal: Negative for abdominal distention, " "constipation, diarrhea, nausea and vomiting.   Endocrine: Negative for cold intolerance and heat intolerance.   Genitourinary: Negative for difficulty urinating, flank pain, hematuria and vaginal bleeding.   Musculoskeletal: Negative for gait problem and joint swelling.   Skin: Negative for pallor.   Allergic/Immunologic: Negative for food allergies.   Neurological: Negative for dizziness, speech difficulty and weakness.   Hematological: Negative for adenopathy. Does not bruise/bleed easily.   Psychiatric/Behavioral: Negative for agitation, confusion and hallucinations. The patient is not nervous/anxious.        VITAL SIGNS: /64   Pulse 67   Temp 98.1 °F (36.7 °C)   Resp 18   Ht 154.9 cm (61\")   Wt 92.4 kg (203 lb 11.2 oz)   SpO2 98%   Breastfeeding No   BMI 38.49 kg/m²   Pain Score    08/20/20 0836   PainSc: 0-No pain       PHYSICAL EXAMINATION:     Physical Exam   Constitutional: She is oriented to person, place, and time. She appears well-developed and well-nourished. No distress.   HENT:   Head: Normocephalic and atraumatic.   Eyes: No scleral icterus.   Neck: Neck supple.   Cardiovascular: Normal rate and regular rhythm.   Pulmonary/Chest: Effort normal and breath sounds normal. No stridor. She has no wheezes.   Bilateral mastectomy scars. Examined with Alicia.   Abdominal: Soft. Bowel sounds are normal. There is no tenderness.   Musculoskeletal: She exhibits no edema.   Lymphadenopathy:     She has no cervical adenopathy.   Neurological: She is alert and oriented to person, place, and time.   Skin: Skin is warm and dry. She is not diaphoretic. No pallor.   Psychiatric: She has a normal mood and affect. Her behavior is normal. Judgment and thought content normal.   Vitals reviewed.      LABS    Lab Results - Last 18 Months   Lab Units 08/12/20  0836 07/15/20  0835 06/17/20  0834 05/20/20  0834 04/22/20  0841 04/15/20  0837   HEMOGLOBIN g/dL 12.1 11.6* 12.1 12.6 11.8* 11.9*   HEMATOCRIT % 35.7 34.0 " 35.1 36.5 34.9 36.0   MCV fL 84.8 84.6 83.6 84.5 82.7 84.1   WBC 10*3/mm3 7.71 7.09 7.05 7.39 7.01 6.38   RDW % 12.9 13.2 13.3 13.7 13.2 13.2   MPV fL 9.0 9.0 9.2 8.8 9.0 9.0   PLATELETS 10*3/mm3 257 265 268 262 275 243   IMM GRAN % % 0.8* 0.6* 0.3 0.5 0.6* 0.3   NEUTROS ABS 10*3/mm3 4.89 4.45 4.72 4.78 4.68 4.06   LYMPHS ABS 10*3/mm3 1.75 1.62 1.46 1.64 1.44 1.45   MONOS ABS 10*3/mm3 0.57 0.56 0.54 0.61 0.52 0.50   EOS ABS 10*3/mm3 0.40 0.38 0.28 0.28 0.28 0.32   BASOS ABS 10*3/mm3 0.04 0.04 0.03 0.04 0.05 0.03   IMMATURE GRANS (ABS) 10*3/mm3 0.06* 0.04 0.02 0.04 0.04 0.02   NRBC /100 WBC 0.0 0.0 0.0 0.0 0.0 0.0       Lab Results - Last 18 Months   Lab Units 08/12/20  0836 07/15/20  0835 04/15/20  0837 03/10/20  0808 11/13/19  0950 11/12/19  0923 10/02/19  0906   GLUCOSE mg/dL 109* 118* 108* 110*  --  124* 134*   SODIUM mmol/L 134* 138 137 138 135* 138 135*   POTASSIUM mmol/L 4.1 3.8 3.8 4.4 4.5 4.0 4.0   TOTAL CO2 mmol/L  --   --   --   --  26.6  --   --    CO2 mmol/L 26.0 24.0 27.0 25.0  --  27.0 27.0   CHLORIDE mmol/L 94* 97* 97* 103 98 100 96*   ANION GAP mmol/L 14.0 17.0* 13.0 10.0  --  11.0 12.0   CREATININE mg/dL 1.64* 1.40* 2.17* 1.21* 1.34* 1.53* 1.21*   BUN mg/dL 25* 28* 45* 17 28* 36* 24*   BUN / CREAT RATIO  15.2 20.0 20.7 14.0 20.9 23.5 19.8   CALCIUM mg/dL 9.6 10.3 10.3 9.4 9.1 8.9 9.6   EGFR IF NONAFRICN AM mL/min/1.73 31* 37* 22* 44* 39* 33* 44*   ALK PHOS U/L 65 59 67 69 50 58 63   TOTAL PROTEIN g/dL 7.0 6.9 7.2 6.9  --  7.3 7.3   ALT (SGPT) U/L 10 15 10 13 9 8 12   AST (SGOT) U/L 13 15 15 19 12 15 13   BILIRUBIN mg/dL 0.4 0.3 0.4 0.4 0.6 0.4 0.5   ALBUMIN g/dL 4.20 4.10 4.20 3.90 4.30 4.20 4.00   GLOBULIN gm/dL 2.8 2.8 3.0 3.0  --  3.1 3.3       Lab Results - Last 18 Months   Lab Units 08/12/20  0836 04/15/20  0837 03/10/20  0808 11/12/19  0923 07/12/19  0916 04/03/19  0840   CEA ng/mL 1.51 1.50 1.48 1.51 1.71 1.37       Lab Results - Last 18 Months   Lab Units 07/15/20  0835 06/17/20  0834  05/20/20  0834 04/22/20  0841 11/13/19  0950 04/30/19  0754   IRON mcg/dL 49 61 104 52  --   --    TIBC mcg/dL 331 352 386 352  --   --    IRON SATURATION % 15* 17* 27 15*  --   --    FERRITIN ng/mL 113.00 152.10* 150.70* 153.30*  --   --    TSH uIU/mL  --   --   --   --  1.970 3.740   FOLATE ng/mL  --   --   --  7.92  --   --        Cee Quiles reports a pain score of 0.        Patient's Body mass index is 38.49 kg/m². BMI is above normal parameters. Recommendations include: referral to primary care.      ASSESSMENT:  1.    Carcinoma of the right breast, infiltrating lobular.  12 o'clock. Low recurrence score of 6, absolute benefit of chemo is less than 1%:  Current Stage: AJCC Stage IA (T1c N0, M0, G1)   Tumor size 1.91 cm.  Hormone Status: ER 98%, CA 92%, HER-2/jeremy negative by FISH.  Baseline Node Status: 0/2 positive.  Treatment status: Post bilateral mastectomy and right sentinel node biopsy.  On adjuvant Femara 2.5 mg 03/22/2019.  Plan for 10 years.   2.   Normocytic anemia from chronic kidney disease Stage IV, GFR 22 mL/minute.   3.   Chronic kidney disease Stage IV, GFR 22 mL/minute 04/15/2020.  Erythropoietin 16.9 on 04/24/2020.  4.   Osteopenia. On Prolia.  5.   Performance status of 0.   6.   Obesity BMI 38.4.            PLAN:  1.     Re:  Tolerating adjuvant Femara.  Stool for occult blood all were negative 04/24/2020.  2.     Re:  Heme status.  Hemoglobin 12.1 gm.  3.     Re:  Pre-office CMP.  GFR 31 mL/minute.  4.     Re:  Pre-office CEA.  Normal at 1.51.  5.     Re:  Pre-office CA27-29.   Normal at 27.2.  6.    Procrit 40,000 units subcutaneous weekly if hemoglobin below 10 and hematocrit below 30 to target a hemoglobin of of 11 and hematocrit of 33.  Move CBC with differential weekly if she starts Procrit.  Observe for adverse reaction especially hypertension.  7.    Schedule Prolia 60 mg subcutaneously every 6 months.  Observe for osteonecrosis of the  jaw.  8.    eRx for Femara 2.5 mg p.o. daily, 90, 3 refills if needed.  Stop and call if intolerant.  Monitor for potential bone loss, thrombosis, and hot flashes.  Plan for 10 years.  High benefit for extended therapy.   9.   Refer to PCP for obesity.  10.  Continue ongoing management per primary care physician and other specialists.  11.  Plan of care discussed with patient.  Understanding expressed.  Patient agreeable to proceed.  12.  Advance Care Planning  ACP discussion was declined by the patient. Patient does not have an advance directive, information provided.  13.  Continue currently identified medications except oral iron.  14.  Next bone density 03/2021.   15.  Return to office in 4 months with pre-office CBC with differential, CEA, CA27-29, and CMP.         I spent 28 total minutes, face-to-face, caring for Cee today.  Greater than 50% of this time involved counseling and/or coordination of care as documented within this note regarding the patient's illness(es), pros and cons of various treatment options, instructions and/or risk reduction.           cc: MD Jimenez Jones MD Thomas Staton, MD

## 2020-08-20 ENCOUNTER — OFFICE VISIT (OUTPATIENT)
Dept: ONCOLOGY | Facility: CLINIC | Age: 75
End: 2020-08-20

## 2020-08-20 VITALS
BODY MASS INDEX: 38.46 KG/M2 | TEMPERATURE: 98.1 F | HEIGHT: 61 IN | DIASTOLIC BLOOD PRESSURE: 64 MMHG | OXYGEN SATURATION: 98 % | WEIGHT: 203.7 LBS | HEART RATE: 67 BPM | RESPIRATION RATE: 18 BRPM | SYSTOLIC BLOOD PRESSURE: 126 MMHG

## 2020-08-20 DIAGNOSIS — Z17.0 MALIGNANT NEOPLASM OF UPPER-OUTER QUADRANT OF RIGHT BREAST IN FEMALE, ESTROGEN RECEPTOR POSITIVE (HCC): Primary | ICD-10-CM

## 2020-08-20 DIAGNOSIS — C50.411 MALIGNANT NEOPLASM OF UPPER-OUTER QUADRANT OF RIGHT BREAST IN FEMALE, ESTROGEN RECEPTOR POSITIVE (HCC): Primary | ICD-10-CM

## 2020-08-20 PROCEDURE — 99214 OFFICE O/P EST MOD 30 MIN: CPT | Performed by: INTERNAL MEDICINE

## 2020-09-09 RX ORDER — ATORVASTATIN CALCIUM 10 MG/1
10 TABLET, FILM COATED ORAL DAILY
Qty: 90 TABLET | Refills: 3 | Status: SHIPPED | OUTPATIENT
Start: 2020-09-09 | End: 2021-09-15

## 2020-09-16 ENCOUNTER — LAB (OUTPATIENT)
Dept: FAMILY MEDICINE CLINIC | Facility: CLINIC | Age: 75
End: 2020-09-16

## 2020-09-16 DIAGNOSIS — N18.30 TYPE 2 DIABETES MELLITUS WITH STAGE 3 CHRONIC KIDNEY DISEASE, WITHOUT LONG-TERM CURRENT USE OF INSULIN (HCC): ICD-10-CM

## 2020-09-16 DIAGNOSIS — I10 ESSENTIAL HYPERTENSION: Primary | ICD-10-CM

## 2020-09-16 DIAGNOSIS — E03.9 ACQUIRED HYPOTHYROIDISM: ICD-10-CM

## 2020-09-16 DIAGNOSIS — E78.2 MIXED HYPERLIPIDEMIA: ICD-10-CM

## 2020-09-16 DIAGNOSIS — E11.22 TYPE 2 DIABETES MELLITUS WITH STAGE 3 CHRONIC KIDNEY DISEASE, WITHOUT LONG-TERM CURRENT USE OF INSULIN (HCC): ICD-10-CM

## 2020-09-17 LAB
ALBUMIN SERPL-MCNC: 4.4 G/DL (ref 3.5–5.2)
ALBUMIN/GLOB SERPL: 2 G/DL
ALP SERPL-CCNC: 71 U/L (ref 39–117)
ALT SERPL-CCNC: 9 U/L (ref 1–33)
AST SERPL-CCNC: 13 U/L (ref 1–32)
BASOPHILS # BLD AUTO: 0.04 10*3/MM3 (ref 0–0.2)
BASOPHILS NFR BLD AUTO: 0.6 % (ref 0–1.5)
BILIRUB SERPL-MCNC: 0.5 MG/DL (ref 0–1.2)
BUN SERPL-MCNC: 25 MG/DL (ref 8–23)
BUN/CREAT SERPL: 18 (ref 7–25)
CALCIUM SERPL-MCNC: 9.8 MG/DL (ref 8.6–10.5)
CHLORIDE SERPL-SCNC: 98 MMOL/L (ref 98–107)
CHOLEST SERPL-MCNC: 163 MG/DL (ref 0–200)
CO2 SERPL-SCNC: 26.3 MMOL/L (ref 22–29)
CREAT SERPL-MCNC: 1.39 MG/DL (ref 0.57–1)
EOSINOPHIL # BLD AUTO: 0.26 10*3/MM3 (ref 0–0.4)
EOSINOPHIL NFR BLD AUTO: 3.9 % (ref 0.3–6.2)
ERYTHROCYTE [DISTWIDTH] IN BLOOD BY AUTOMATED COUNT: 13 % (ref 12.3–15.4)
GLOBULIN SER CALC-MCNC: 2.2 GM/DL
GLUCOSE SERPL-MCNC: 104 MG/DL (ref 65–99)
HBA1C MFR BLD: 5.2 % (ref 4.8–5.6)
HCT VFR BLD AUTO: 35.2 % (ref 34–46.6)
HDLC SERPL-MCNC: 39 MG/DL (ref 40–60)
HGB BLD-MCNC: 12.2 G/DL (ref 12–15.9)
IMM GRANULOCYTES # BLD AUTO: 0.03 10*3/MM3 (ref 0–0.05)
IMM GRANULOCYTES NFR BLD AUTO: 0.4 % (ref 0–0.5)
LDLC SERPL CALC-MCNC: 95 MG/DL (ref 0–100)
LYMPHOCYTES # BLD AUTO: 1.57 10*3/MM3 (ref 0.7–3.1)
LYMPHOCYTES NFR BLD AUTO: 23.4 % (ref 19.6–45.3)
MCH RBC QN AUTO: 29.8 PG (ref 26.6–33)
MCHC RBC AUTO-ENTMCNC: 34.7 G/DL (ref 31.5–35.7)
MCV RBC AUTO: 85.9 FL (ref 79–97)
MONOCYTES # BLD AUTO: 0.43 10*3/MM3 (ref 0.1–0.9)
MONOCYTES NFR BLD AUTO: 6.4 % (ref 5–12)
NEUTROPHILS # BLD AUTO: 4.38 10*3/MM3 (ref 1.7–7)
NEUTROPHILS NFR BLD AUTO: 65.3 % (ref 42.7–76)
NRBC BLD AUTO-RTO: 0 /100 WBC (ref 0–0.2)
PLATELET # BLD AUTO: 283 10*3/MM3 (ref 140–450)
POTASSIUM SERPL-SCNC: 4.8 MMOL/L (ref 3.5–5.2)
PROT SERPL-MCNC: 6.6 G/DL (ref 6–8.5)
RBC # BLD AUTO: 4.1 10*6/MM3 (ref 3.77–5.28)
SODIUM SERPL-SCNC: 133 MMOL/L (ref 136–145)
TRIGL SERPL-MCNC: 144 MG/DL (ref 0–150)
TSH SERPL DL<=0.005 MIU/L-ACNC: 3.41 UIU/ML (ref 0.27–4.2)
VLDLC SERPL CALC-MCNC: 28.8 MG/DL
WBC # BLD AUTO: 6.71 10*3/MM3 (ref 3.4–10.8)

## 2020-09-18 ENCOUNTER — OFFICE VISIT (OUTPATIENT)
Dept: FAMILY MEDICINE CLINIC | Facility: CLINIC | Age: 75
End: 2020-09-18

## 2020-09-18 VITALS
HEART RATE: 83 BPM | HEIGHT: 61 IN | TEMPERATURE: 97.3 F | OXYGEN SATURATION: 97 % | BODY MASS INDEX: 38.21 KG/M2 | DIASTOLIC BLOOD PRESSURE: 80 MMHG | SYSTOLIC BLOOD PRESSURE: 132 MMHG | RESPIRATION RATE: 16 BRPM | WEIGHT: 202.4 LBS

## 2020-09-18 DIAGNOSIS — E11.22 TYPE 2 DIABETES MELLITUS WITH STAGE 3 CHRONIC KIDNEY DISEASE, WITHOUT LONG-TERM CURRENT USE OF INSULIN (HCC): ICD-10-CM

## 2020-09-18 DIAGNOSIS — E66.01 MORBIDLY OBESE (HCC): ICD-10-CM

## 2020-09-18 DIAGNOSIS — N18.30 TYPE 2 DIABETES MELLITUS WITH STAGE 3 CHRONIC KIDNEY DISEASE, WITHOUT LONG-TERM CURRENT USE OF INSULIN (HCC): ICD-10-CM

## 2020-09-18 DIAGNOSIS — N18.30 CKD (CHRONIC KIDNEY DISEASE) STAGE 3, GFR 30-59 ML/MIN (HCC): ICD-10-CM

## 2020-09-18 DIAGNOSIS — I10 ESSENTIAL HYPERTENSION: Primary | ICD-10-CM

## 2020-09-18 PROCEDURE — G0439 PPPS, SUBSEQ VISIT: HCPCS | Performed by: FAMILY MEDICINE

## 2020-09-18 PROCEDURE — 99214 OFFICE O/P EST MOD 30 MIN: CPT | Performed by: FAMILY MEDICINE

## 2020-09-18 NOTE — PROGRESS NOTES
The ABCs of the Annual Wellness Visit  Subsequent Medicare Wellness Visit    Chief Complaint   Patient presents with   • Medicare Wellness-subsequent   • Hypertension       Subjective   History of Present Illness:  Cee Quiles is a 75 y.o. female who presents for a Subsequent Medicare Wellness Visit.    HEALTH RISK ASSESSMENT    Recent Hospitalizations:  No hospitalization(s) within the last year.    Current Medical Providers:  Patient Care Team:  Neville Sandoval MD as PCP - General  Andrea Cartwright MD as PCP - Claims Attributed  Andrea Cartwright MD as Consulting Physician (Hematology and Oncology)    Smoking Status:  Social History     Tobacco Use   Smoking Status Never Smoker   Smokeless Tobacco Never Used       Alcohol Consumption:  Social History     Substance and Sexual Activity   Alcohol Use No       Depression Screen:   PHQ-2/PHQ-9 Depression Screening 8/20/2020   Little interest or pleasure in doing things 0   Feeling down, depressed, or hopeless 0   Trouble falling or staying asleep, or sleeping too much 0   Feeling tired or having little energy 0   Poor appetite or overeating 0   Feeling bad about yourself - or that you are a failure or have let yourself or your family down 0   Trouble concentrating on things, such as reading the newspaper or watching television 0   Moving or speaking so slowly that other people could have noticed. Or the opposite - being so fidgety or restless that you have been moving around a lot more than usual 0   Thoughts that you would be better off dead, or of hurting yourself in some way 0   Total Score 0   If you checked off any problems, how difficult have these problems made it for you to do your work, take care of things at home, or get along with other people? -       Fall Risk Screen:  STEADI Fall Risk Assessment has not been completed.    Health Habits and Functional and Cognitive Screening:  Functional & Cognitive Status 11/18/2019   Do you have difficulty  preparing food and eating? No   Do you have difficulty bathing yourself, getting dressed or grooming yourself? No   Do you have difficulty using the toilet? No   Do you have difficulty moving around from place to place? No   Do you have trouble with steps or getting out of a bed or a chair? No   Current Diet Well Balanced Diet   Dental Exam Up to date   Eye Exam Up to date   Exercise (times per week) 3 times per week   Current Exercise Activities Include Housecleaning   Do you need help using the phone?  No   Are you deaf or do you have serious difficulty hearing?  No   Do you need help with transportation? No   Do you need help shopping? No   Do you need help preparing meals?  No   Do you need help with housework?  No   Do you need help with laundry? No   Do you need help taking your medications? No   Do you need help managing money? No   Do you ever drive or ride in a car without wearing a seat belt? No   Have you felt unusual stress, anger or loneliness in the last month? No   Who do you live with? Spouse   If you need help, do you have trouble finding someone available to you? No   Have you been bothered in the last four weeks by sexual problems? No   Do you have difficulty concentrating, remembering or making decisions? No         Does the patient have evidence of cognitive impairment? No    Asprin use counseling:Taking ASA appropriately as indicated    Age-appropriate Screening Schedule:  Refer to the list below for future screening recommendations based on patient's age, sex and/or medical conditions. Orders for these recommended tests are listed in the plan section. The patient has been provided with a written plan.    Health Maintenance   Topic Date Due   • TDAP/TD VACCINES (1 - Tdap) 01/24/1966   • ZOSTER VACCINE (1 of 2) 01/24/1997   • URINE MICROALBUMIN  05/18/2019   • INFLUENZA VACCINE  08/01/2020   • DIABETIC EYE EXAM  10/16/2020   • DIABETIC FOOT EXAM  11/18/2020   • MAMMOGRAM  01/30/2021   •  HEMOGLOBIN A1C  03/16/2021   • DXA SCAN  03/29/2021   • LIPID PANEL  09/16/2021   • COLONOSCOPY  08/14/2022          The following portions of the patient's history were reviewed and updated as appropriate: allergies, current medications, past family history, past medical history, past social history, past surgical history and problem list.    Outpatient Medications Prior to Visit   Medication Sig Dispense Refill   • atorvastatin (LIPITOR) 10 MG tablet TAKE 1 TABLET BY MOUTH  DAILY 90 tablet 3   • calcium citrate-vitamin d (CALCITRATE) 315-250 MG-UNIT tablet tablet Take  by mouth Daily.     • Denosumab (PROLIA SC) Inject  under the skin into the appropriate area as directed.     • furosemide (LASIX) 20 MG tablet Take 20 mg by mouth 2 (Two) Times a Day.     • glipizide (GLUCOTROL XL) 2.5 MG 24 hr tablet Take 1 tablet by mouth Daily. 90 tablet 1   • letrozole (FEMARA) 2.5 MG tablet TAKE 1 TABLET BY MOUTH  DAILY 90 tablet 3   • levothyroxine (SYNTHROID, LEVOTHROID) 100 MCG tablet Take 1 tablet by mouth Daily. 90 tablet 1   • meclizine (ANTIVERT) 25 MG tablet Take 25 mg by mouth 3 (Three) Times a Day As Needed for dizziness.     • nebivolol (Bystolic) 20 MG tablet Take 1 tablet by mouth Daily. 90 tablet 1   • valsartan (DIOVAN) 320 MG tablet TAKE 1 TABLET BY MOUTH  DAILY 90 tablet 3   • ferrous sulfate 325 (65 FE) MG tablet Take 1 tablet by mouth Daily With Breakfast. 60 tablet 2   • hydroCHLOROthiazide (HYDRODIURIL) 25 MG tablet TAKE 1 TABLET BY MOUTH  DAILY 90 tablet 3     No facility-administered medications prior to visit.        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 Patient does not have an advance directive, information provided.    Review of Systems    Compared to one year ago, the patient feels her physical health is the same.  Compared to one year ago, the patient feels her mental health is the same.    Reviewed chart for potential of high risk medication in the elderly: yes  Reviewed chart for potential of harmful drug interactions in the elderly:yes    Objective         Vitals:    09/18/20 0857   BP: 132/80   Pulse: 83   Resp: 16   Temp: 97.3 °F (36.3 °C)   SpO2: 97%   Weight: 91.8 kg (202 lb 6.4 oz)   Height: 154.9 cm (61\")       Body mass index is 38.24 kg/m².  Discussed the patient's BMI with her. The BMI is above average; BMI management plan is completed.    Physical Exam    Lab Results   Component Value Date     (H) 09/16/2020    CHLPL 163 09/16/2020    TRIG 144 09/16/2020    HDL 39 (L) 09/16/2020    LDL 95 09/16/2020    VLDL 28.8 09/16/2020    HGBA1C 5.20 09/16/2020        Assessment/Plan   Medicare Risks and Personalized Health Plan  CMS Preventative Services Quick Reference  Advance Directive Discussion    The above risks/problems have been discussed with the patient.  Pertinent information has been shared with the patient in the After Visit Summary.  Follow up plans and orders are seen below in the Assessment/Plan Section.    Diagnoses and all orders for this visit:    1. Essential hypertension (Primary)    2. Morbidly obese (CMS/[facility])    3. Type 2 diabetes mellitus with stage 3 chronic kidney disease, without long-term current use of insulin (CMS/[facility])  -     MicroAlbumin, Urine, Random - Urine, Clean Catch    4. CKD (chronic kidney disease) stage 3, GFR 30-59 ml/min " (CMS/HCC)      Follow Up:  No follow-ups on file.     An After Visit Summary and PPPS were given to the patient.

## 2020-09-18 NOTE — PROGRESS NOTES
Subjective   Cee Quiles is a 75 y.o. female.     Chief Complaint   Patient presents with   • Medicare Wellness-subsequent   • Hypertension        History of Present Illness     she thinks bp aned bs are doing well ---toleratin statin without myagia s---she is toleriang synthrodi wituout heat or cold iontoabnces---she is toleratging prolia for osteoiporsos---seein nepyhrology for ckd      Current Outpatient Medications:   •  atorvastatin (LIPITOR) 10 MG tablet, TAKE 1 TABLET BY MOUTH  DAILY, Disp: 90 tablet, Rfl: 3  •  calcium citrate-vitamin d (CALCITRATE) 315-250 MG-UNIT tablet tablet, Take  by mouth Daily., Disp: , Rfl:   •  Denosumab (PROLIA SC), Inject  under the skin into the appropriate area as directed., Disp: , Rfl:   •  furosemide (LASIX) 20 MG tablet, Take 20 mg by mouth 2 (Two) Times a Day., Disp: , Rfl:   •  glipizide (GLUCOTROL XL) 2.5 MG 24 hr tablet, Take 1 tablet by mouth Daily., Disp: 90 tablet, Rfl: 1  •  letrozole (FEMARA) 2.5 MG tablet, TAKE 1 TABLET BY MOUTH  DAILY, Disp: 90 tablet, Rfl: 3  •  levothyroxine (SYNTHROID, LEVOTHROID) 100 MCG tablet, Take 1 tablet by mouth Daily., Disp: 90 tablet, Rfl: 1  •  meclizine (ANTIVERT) 25 MG tablet, Take 25 mg by mouth 3 (Three) Times a Day As Needed for dizziness., Disp: , Rfl:   •  nebivolol (Bystolic) 20 MG tablet, Take 1 tablet by mouth Daily., Disp: 90 tablet, Rfl: 1  •  valsartan (DIOVAN) 320 MG tablet, TAKE 1 TABLET BY MOUTH  DAILY, Disp: 90 tablet, Rfl: 3  Allergies   Allergen Reactions   • Keflex [Cephalexin] Anaphylaxis   • Lortab [Hydrocodone-Acetaminophen] Nausea Only       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, left     WRIST   • Cancer (CMS/HCC)     LOBULAR RIGHT BREAST   • Chronic knee pain     BILATERAL   • Colon polyp    • Cyst of ovary, left    • Diabetes (CMS/HCC)    • Diabetes mellitus (CMS/HCC)    • Dizziness    • Elevated cholesterol    • Gallstones    • Hx of colonic  "polyps    • Hyperlipidemia    • Hypertension    • Hypertension    • Hypothyroid    • Kidney disease    • Long term (current) use of aromatase inhibitors 4/23/2020   • Malignant neoplasm of upper-inner quadrant of right breast in female, estrogen receptor positive (CMS/HCC) 4/23/2020   • Osteopenia of neck of femur 4/23/2020   • PONV (postoperative nausea and vomiting)    • Torn meniscus      Past Surgical History:   Procedure Laterality Date   • CHOLECYSTECTOMY     • COLONOSCOPY W/ POLYPECTOMY  02/08/2012    Pedunculated polyp cecal pit, Hyperplastic polyp at 15 cm repeat exam in 5 years   • KNEE SURGERY Right    • MASTECTOMY W/ SENTINEL NODE BIOPSY Bilateral 2/12/2019    Procedure: BILATERAL MASTECTOMY WITH RIGHT BREAST SENTINEL LYMPH NODE BIOPSY - RADIOLOGIST WILL INJECT;  Surgeon: Michael Hodges MD;  Location: [facility] OR;  Service: General   • OVARIAN CYST DRAINAGE Left    • TUMOR REMOVAL Left        Review of Systems   Constitutional: Negative.    HENT: Negative.    Eyes: Negative.    Respiratory: Negative.    Cardiovascular: Negative.    Gastrointestinal: Negative.    Endocrine: Negative.    Genitourinary: Negative.    Musculoskeletal: Negative.    Skin: Negative.    Allergic/Immunologic: Negative.    Neurological: Negative.    Hematological: Negative.    Psychiatric/Behavioral: Negative.        Objective  /80   Pulse 83   Temp 97.3 °F (36.3 °C)   Resp 16   Ht 154.9 cm (61\")   Wt 91.8 kg (202 lb 6.4 oz)   SpO2 97%   Breastfeeding No   BMI 38.24 kg/m²   Physical Exam  Vitals signs and nursing note reviewed.   Constitutional:       Appearance: Normal appearance. She is obese.   HENT:      Head: Normocephalic and atraumatic.      Right Ear: Tympanic membrane, ear canal and external ear normal.      Left Ear: Tympanic membrane and ear canal normal.      Nose: Nose normal.      Mouth/Throat:      Mouth: Mucous membranes are dry.      Pharynx: Oropharynx is clear.   Eyes:      Extraocular Movements: " Extraocular movements intact.      Conjunctiva/sclera: Conjunctivae normal.      Pupils: Pupils are equal, round, and reactive to light.   Neck:      Musculoskeletal: Normal range of motion and neck supple.   Cardiovascular:      Rate and Rhythm: Normal rate and regular rhythm.      Pulses: Normal pulses.      Heart sounds: Normal heart sounds.   Pulmonary:      Effort: Pulmonary effort is normal.      Breath sounds: Normal breath sounds.   Abdominal:      General: Abdomen is flat. Bowel sounds are normal.      Palpations: Abdomen is soft.   Musculoskeletal: Normal range of motion.   Skin:     General: Skin is warm and dry.      Capillary Refill: Capillary refill takes less than 2 seconds.   Neurological:      General: No focal deficit present.      Mental Status: She is alert and oriented to person, place, and time.   Psychiatric:         Mood and Affect: Mood normal.         Behavior: Behavior normal.         Thought Content: Thought content normal.         Judgment: Judgment normal.         Assessment/Plan   Cee was seen today for medicare wellness-subsequent and hypertension.    Diagnoses and all orders for this visit:    Essential hypertension    Morbidly obese (CMS/[facility])    Type 2 diabetes mellitus with stage 3 chronic kidney disease, without long-term current use of insulin (CMS/[facility])  -     MicroAlbumin, Urine, Random - Urine, Clean Catch    CKD (chronic kidney disease) stage 3, GFR 30-59 ml/min (CMS/[facility])      We discused recent laBs           Orders Placed This Encounter   Procedures   • MicroAlbumin, Urine, Random - Urine, Clean Catch       Follow up: 5 month(s)

## 2020-09-19 LAB — MICROALBUMIN UR-MCNC: <3 UG/ML

## 2020-10-23 DIAGNOSIS — M85.851 OSTEOPENIA OF NECK OF RIGHT FEMUR: Primary | ICD-10-CM

## 2020-10-23 DIAGNOSIS — Z17.0 MALIGNANT NEOPLASM OF UPPER-INNER QUADRANT OF RIGHT BREAST IN FEMALE, ESTROGEN RECEPTOR POSITIVE (HCC): ICD-10-CM

## 2020-10-23 DIAGNOSIS — Z79.811 LONG TERM (CURRENT) USE OF AROMATASE INHIBITORS: ICD-10-CM

## 2020-10-23 DIAGNOSIS — C50.211 MALIGNANT NEOPLASM OF UPPER-INNER QUADRANT OF RIGHT BREAST IN FEMALE, ESTROGEN RECEPTOR POSITIVE (HCC): ICD-10-CM

## 2020-10-26 ENCOUNTER — LAB (OUTPATIENT)
Dept: LAB | Facility: HOSPITAL | Age: 75
End: 2020-10-26

## 2020-10-26 ENCOUNTER — INFUSION (OUTPATIENT)
Dept: ONCOLOGY | Facility: HOSPITAL | Age: 75
End: 2020-10-26

## 2020-10-26 VITALS
RESPIRATION RATE: 18 BRPM | DIASTOLIC BLOOD PRESSURE: 78 MMHG | SYSTOLIC BLOOD PRESSURE: 173 MMHG | HEART RATE: 65 BPM | OXYGEN SATURATION: 96 % | TEMPERATURE: 97.5 F

## 2020-10-26 DIAGNOSIS — C50.211 MALIGNANT NEOPLASM OF UPPER-INNER QUADRANT OF RIGHT BREAST IN FEMALE, ESTROGEN RECEPTOR POSITIVE (HCC): ICD-10-CM

## 2020-10-26 DIAGNOSIS — M85.851 OSTEOPENIA OF NECK OF RIGHT FEMUR: Primary | ICD-10-CM

## 2020-10-26 DIAGNOSIS — Z17.0 MALIGNANT NEOPLASM OF UPPER-INNER QUADRANT OF RIGHT BREAST IN FEMALE, ESTROGEN RECEPTOR POSITIVE (HCC): ICD-10-CM

## 2020-10-26 DIAGNOSIS — Z79.811 LONG TERM (CURRENT) USE OF AROMATASE INHIBITORS: ICD-10-CM

## 2020-10-26 DIAGNOSIS — M85.851 OSTEOPENIA OF NECK OF RIGHT FEMUR: ICD-10-CM

## 2020-10-26 DIAGNOSIS — C50.211 MALIGNANT NEOPLASM OF UPPER-INNER QUADRANT OF RIGHT BREAST IN FEMALE, ESTROGEN RECEPTOR POSITIVE (HCC): Primary | ICD-10-CM

## 2020-10-26 DIAGNOSIS — Z17.0 MALIGNANT NEOPLASM OF UPPER-INNER QUADRANT OF RIGHT BREAST IN FEMALE, ESTROGEN RECEPTOR POSITIVE (HCC): Primary | ICD-10-CM

## 2020-10-26 LAB
ALBUMIN SERPL-MCNC: 4.1 G/DL (ref 3.5–5.2)
ALBUMIN/GLOB SERPL: 1.4 G/DL
ALP SERPL-CCNC: 71 U/L (ref 39–117)
ALT SERPL W P-5'-P-CCNC: 11 U/L (ref 1–33)
ANION GAP SERPL CALCULATED.3IONS-SCNC: 9 MMOL/L (ref 5–15)
AST SERPL-CCNC: 15 U/L (ref 1–32)
BILIRUB SERPL-MCNC: 0.6 MG/DL (ref 0–1.2)
BUN SERPL-MCNC: 23 MG/DL (ref 8–23)
BUN/CREAT SERPL: 14.6 (ref 7–25)
CALCIUM SPEC-SCNC: 10.2 MG/DL (ref 8.6–10.5)
CHLORIDE SERPL-SCNC: 100 MMOL/L (ref 98–107)
CO2 SERPL-SCNC: 27 MMOL/L (ref 22–29)
CREAT SERPL-MCNC: 1.58 MG/DL (ref 0.57–1)
GFR SERPL CREATININE-BSD FRML MDRD: 32 ML/MIN/1.73
GLOBULIN UR ELPH-MCNC: 3 GM/DL
GLUCOSE SERPL-MCNC: 116 MG/DL (ref 65–99)
POTASSIUM SERPL-SCNC: 3.8 MMOL/L (ref 3.5–5.2)
PROT SERPL-MCNC: 7.1 G/DL (ref 6–8.5)
SODIUM SERPL-SCNC: 136 MMOL/L (ref 136–145)
WHOLE BLOOD HOLD SPECIMEN: NORMAL

## 2020-10-26 PROCEDURE — 80053 COMPREHEN METABOLIC PANEL: CPT

## 2020-10-26 PROCEDURE — 25010000002 DENOSUMAB 60 MG/ML SOLUTION PREFILLED SYRINGE: Performed by: INTERNAL MEDICINE

## 2020-10-26 PROCEDURE — 36415 COLL VENOUS BLD VENIPUNCTURE: CPT

## 2020-10-26 PROCEDURE — 96372 THER/PROPH/DIAG INJ SC/IM: CPT

## 2020-10-26 RX ADMIN — DENOSUMAB 60 MG: 60 INJECTION SUBCUTANEOUS at 10:48

## 2020-11-23 RX ORDER — NEBIVOLOL HYDROCHLORIDE 20 MG/1
TABLET ORAL
Qty: 90 TABLET | Refills: 3 | Status: SHIPPED | OUTPATIENT
Start: 2020-11-23 | End: 2021-09-15

## 2020-11-23 RX ORDER — GLIPIZIDE 2.5 MG/1
2.5 TABLET, EXTENDED RELEASE ORAL DAILY
Qty: 90 TABLET | Refills: 3 | Status: SHIPPED | OUTPATIENT
Start: 2020-11-23 | End: 2021-09-15

## 2020-11-23 RX ORDER — LEVOTHYROXINE SODIUM 0.1 MG/1
100 TABLET ORAL DAILY
Qty: 90 TABLET | Refills: 3 | Status: SHIPPED | OUTPATIENT
Start: 2020-11-23 | End: 2021-09-15

## 2020-11-23 NOTE — TELEPHONE ENCOUNTER
Requested Prescriptions     Pending Prescriptions Disp Refills   • glipizide (GLUCOTROL XL) 2.5 MG 24 hr tablet [Pharmacy Med Name: GLIPIZIDE  2.5MG  TAB  XL] 90 tablet 3     Sig: TAKE 1 TABLET BY MOUTH  DAILY   • Bystolic 20 MG tablet [Pharmacy Med Name: BYSTOLIC  20MG  TAB] 90 tablet 3     Sig: TAKE 1 TABLET BY MOUTH  DAILY   • levothyroxine (SYNTHROID, LEVOTHROID) 100 MCG tablet [Pharmacy Med Name: LEVOTHYROXINE  100MCG  TAB] 90 tablet 3     Sig: TAKE 1 TABLET BY MOUTH  DAILY

## 2020-11-30 RX ORDER — LETROZOLE 2.5 MG/1
2.5 TABLET, FILM COATED ORAL DAILY
Qty: 90 TABLET | Refills: 3 | Status: SHIPPED | OUTPATIENT
Start: 2020-11-30 | End: 2021-09-15

## 2020-12-10 ENCOUNTER — LAB (OUTPATIENT)
Dept: LAB | Facility: HOSPITAL | Age: 75
End: 2020-12-10

## 2020-12-10 DIAGNOSIS — C50.411 MALIGNANT NEOPLASM OF UPPER-OUTER QUADRANT OF RIGHT BREAST IN FEMALE, ESTROGEN RECEPTOR POSITIVE (HCC): ICD-10-CM

## 2020-12-10 DIAGNOSIS — Z17.0 MALIGNANT NEOPLASM OF UPPER-OUTER QUADRANT OF RIGHT BREAST IN FEMALE, ESTROGEN RECEPTOR POSITIVE (HCC): ICD-10-CM

## 2020-12-10 LAB
ALBUMIN SERPL-MCNC: 4.1 G/DL (ref 3.5–5.2)
ALBUMIN/GLOB SERPL: 1.4 G/DL
ALP SERPL-CCNC: 69 U/L (ref 39–117)
ALT SERPL W P-5'-P-CCNC: 10 U/L (ref 1–33)
ANION GAP SERPL CALCULATED.3IONS-SCNC: 9 MMOL/L (ref 5–15)
AST SERPL-CCNC: 14 U/L (ref 1–32)
BASOPHILS # BLD AUTO: 0.05 10*3/MM3 (ref 0–0.2)
BASOPHILS NFR BLD AUTO: 0.7 % (ref 0–1.5)
BILIRUB SERPL-MCNC: 0.4 MG/DL (ref 0–1.2)
BUN SERPL-MCNC: 28 MG/DL (ref 8–23)
BUN/CREAT SERPL: 18.7 (ref 7–25)
CALCIUM SPEC-SCNC: 9.2 MG/DL (ref 8.6–10.5)
CEA SERPL-MCNC: 1.34 NG/ML
CHLORIDE SERPL-SCNC: 102 MMOL/L (ref 98–107)
CO2 SERPL-SCNC: 27 MMOL/L (ref 22–29)
CREAT SERPL-MCNC: 1.5 MG/DL (ref 0.57–1)
DEPRECATED RDW RBC AUTO: 42.3 FL (ref 37–54)
EOSINOPHIL # BLD AUTO: 0.28 10*3/MM3 (ref 0–0.4)
EOSINOPHIL NFR BLD AUTO: 4.1 % (ref 0.3–6.2)
ERYTHROCYTE [DISTWIDTH] IN BLOOD BY AUTOMATED COUNT: 13.6 % (ref 12.3–15.4)
GFR SERPL CREATININE-BSD FRML MDRD: 34 ML/MIN/1.73
GLOBULIN UR ELPH-MCNC: 3 GM/DL
GLUCOSE SERPL-MCNC: 119 MG/DL (ref 65–99)
HCT VFR BLD AUTO: 36 % (ref 34–46.6)
HGB BLD-MCNC: 12.1 G/DL (ref 12–15.9)
IMM GRANULOCYTES # BLD AUTO: 0.03 10*3/MM3 (ref 0–0.05)
IMM GRANULOCYTES NFR BLD AUTO: 0.4 % (ref 0–0.5)
LYMPHOCYTES # BLD AUTO: 1.64 10*3/MM3 (ref 0.7–3.1)
LYMPHOCYTES NFR BLD AUTO: 23.9 % (ref 19.6–45.3)
MCH RBC QN AUTO: 28.5 PG (ref 26.6–33)
MCHC RBC AUTO-ENTMCNC: 33.6 G/DL (ref 31.5–35.7)
MCV RBC AUTO: 84.7 FL (ref 79–97)
MONOCYTES # BLD AUTO: 0.48 10*3/MM3 (ref 0.1–0.9)
MONOCYTES NFR BLD AUTO: 7 % (ref 5–12)
NEUTROPHILS NFR BLD AUTO: 4.37 10*3/MM3 (ref 1.7–7)
NEUTROPHILS NFR BLD AUTO: 63.9 % (ref 42.7–76)
NRBC BLD AUTO-RTO: 0 /100 WBC (ref 0–0.2)
PLATELET # BLD AUTO: 272 10*3/MM3 (ref 140–450)
PMV BLD AUTO: 9.2 FL (ref 6–12)
POTASSIUM SERPL-SCNC: 4 MMOL/L (ref 3.5–5.2)
PROT SERPL-MCNC: 7.1 G/DL (ref 6–8.5)
RBC # BLD AUTO: 4.25 10*6/MM3 (ref 3.77–5.28)
SODIUM SERPL-SCNC: 138 MMOL/L (ref 136–145)
WBC # BLD AUTO: 6.85 10*3/MM3 (ref 3.4–10.8)

## 2020-12-10 PROCEDURE — 85025 COMPLETE CBC W/AUTO DIFF WBC: CPT

## 2020-12-10 PROCEDURE — 82378 CARCINOEMBRYONIC ANTIGEN: CPT

## 2020-12-10 PROCEDURE — 80053 COMPREHEN METABOLIC PANEL: CPT

## 2020-12-10 PROCEDURE — 36415 COLL VENOUS BLD VENIPUNCTURE: CPT

## 2020-12-10 PROCEDURE — 86300 IMMUNOASSAY TUMOR CA 15-3: CPT

## 2020-12-10 NOTE — PROGRESS NOTES
[facility]  [address]  [place] [address] [phone]    Patient Name: Cee Quiles  Encounter Date: 12/17/2020  YOB: 1945  Patient Number: [id number]      REASON FOR FOLLOW-UP: Cee Quiles is a pleasant 75-year-old  female who is seen on followup for Stage IA right breast cancer, receptor positive, and HER-2/jeremy negative.  Low recurrence score of 6, absolute benefit of chemo is less than 1%.   She is on adjuvant Femara for the past 21 months. She is also seen for anemia from iron deficiency and chronic kidney disease.  She is off iron for the past 3.75 months.  She is seen alone.  She is a reliable historian.    I have reviewed the HPI and verified with the patient the accuracy of it. No changes to interval history since the information was documented. Andrea Cartwright MD 12/17/20         Oncology/Hematology History Overview Note   DIAGNOSTIC ABNORMALITIES:  The patient was found to have a right breast mass by mammogram at [facility].   Right breast core biopsy 01/17/2019 showed infiltrating lobular carcinoma, grade 1. Greatest dimension of tumor measured 11.1 mm. ER 98%, AL 92%, HER-2/jeremy +1 and negative FISH, and Ki-67 at 7%,. Oncotype DX report. Low recurrence score of 6, absolute benefit of chemo is less than 1%  Breast MRI [facility] 01/30/2019, 1.8 x 0.7 x 0.6 cm, non mass-like linear enhancement at 1 o'clock. Second suspicious area of enhancement in the right breast just behind the nipple measuring 1 x 1 x 0.9 cm. Two areas of linear non mass-like enhancement has anterior depth at 1 o'clock in the left breast.   The patient was seen by Dr. Hodges 02/01/2019. Planned for bilateral mastectomy and sentinel lymph node evaluation.   Ultrasound biopsy left breast showed atypical lobular hyperplasia. No histologic evidence of malignancy.   The patient had a  followup with Dr. Hodges 02/13/2019.  CBC 02/13/2019 revealed a WBC of 10.6, hemoglobin 12.2, hematocrit 36.8, MCV 86.4, platelet count 211,000 with ANC of 9.4. CMP remarkable for a GFR of 46 mL/minute.  Pathology report 02/15/2019 left breast showed no evidence of in situ or invasive carcinoma. Right breast showed an infiltrating lobular carcinoma, grade 1 with residual 8 mm.       PREVIOUS INTERVENTIONS:   Bilateral mastectomy with right breast sentinel lymph node biopsy on 02/12/2019.  Adjuvant Femara 2.5 mg p.o. daily 03/22/2019 through present.      PREVIOUS INTERVENTIONS:  Ferrous sulfate 325 mg daily 04/22/2020 through 05/20/2020.  Resume 07/17/2020 through 08/20/2020.     Breast CA (CMS/HCC)   2/12/2019 Initial Diagnosis    Breast CA (CMS/HCC)     Malignant neoplasm of upper-inner quadrant of right breast in female, estrogen receptor positive (CMS/HCC)   4/23/2020 Initial Diagnosis    Malignant neoplasm of upper-inner quadrant of right breast in female, estrogen receptor positive (CMS/HCC)     4/24/2020 -  Chemotherapy    OP SUPPORTIVE Denosumab (Prolia) Q6M         PAST MEDICAL HISTORY:  ALLERGIES:  Allergies   Allergen Reactions   • Keflex [Cephalexin] Anaphylaxis   • Lortab [Hydrocodone-Acetaminophen] Nausea Only     CURRENT MEDICATIONS:  Outpatient Encounter Medications as of 12/17/2020   Medication Sig Dispense Refill   • atorvastatin (LIPITOR) 10 MG tablet TAKE 1 TABLET BY MOUTH  DAILY 90 tablet 3   • Bystolic 20 MG tablet TAKE 1 TABLET BY MOUTH  DAILY 90 tablet 3   • calcium citrate-vitamin d (CALCITRATE) 315-250 MG-UNIT tablet tablet Take  by mouth Daily.     • Denosumab (PROLIA SC) Inject  under the skin into the appropriate area as directed.     • furosemide (LASIX) 20 MG tablet Take 20 mg by mouth 2 (Two) Times a Day.     • glipizide (GLUCOTROL XL) 2.5 MG 24 hr tablet TAKE 1 TABLET BY MOUTH  DAILY 90 tablet 3   • letrozole (FEMARA) 2.5 MG tablet TAKE 1 TABLET BY MOUTH  DAILY 90 tablet 3   •  levothyroxine (SYNTHROID, LEVOTHROID) 100 MCG tablet TAKE 1 TABLET BY MOUTH  DAILY 90 tablet 3   • meclizine (ANTIVERT) 25 MG tablet Take 25 mg by mouth 3 (Three) Times a Day As Needed for dizziness.     • valsartan (DIOVAN) 320 MG tablet TAKE 1 TABLET BY MOUTH  DAILY 90 tablet 3     No facility-administered encounter medications on file as of 12/17/2020.      ADULT ILLNESSES:  Patient Active Problem List   Diagnosis Code   • Type 2 diabetes mellitus with diabetic chronic kidney disease (CMS/[facility]) E11.22   • Essential hypertension I10   • Acquired hypothyroidism E03.9   • Chronic pain of right knee M25.561, G89.29   • CKD (chronic kidney disease) stage 3, GFR 30-59 ml/min N18.30   • Mixed hyperlipidemia E78.2   • Morbidly obese (CMS/[facility]) E66.01   • Breast CA (CMS/[facility]) C50.919   • Postmenopausal Z78.0   • Hypomagnesemia E83.42   • Osteopenia of neck of femur M85.859   • Malignant neoplasm of upper-inner quadrant of right breast in female, estrogen receptor positive (CMS/[facility]) C50.211, Z17.0   • Long term (current) use of aromatase inhibitors Z79.811     SURGERIES:  Past Surgical History:   Procedure Laterality Date   • CHOLECYSTECTOMY     • COLONOSCOPY W/ POLYPECTOMY  02/08/2012    Pedunculated polyp cecal pit, Hyperplastic polyp at 15 cm repeat exam in 5 years   • KNEE SURGERY Right    • MASTECTOMY W/ SENTINEL NODE BIOPSY Bilateral 2/12/2019    Procedure: BILATERAL MASTECTOMY WITH RIGHT BREAST SENTINEL LYMPH NODE BIOPSY - RADIOLOGIST WILL INJECT;  Surgeon: Michael Hodges MD;  Location: [facility] OR;  Service: General   • OVARIAN CYST DRAINAGE Left    • TUMOR REMOVAL Left      HEALTH MAINTENANCE ITEMS:  Health Maintenance Due   Topic Date Due   • TDAP/TD VACCINES (1 - Tdap) 01/24/1966   • ZOSTER VACCINE (1 of 2) 01/24/1997   • HEPATITIS C SCREENING  10/21/2016   • DIABETIC EYE EXAM  10/16/2020   • Pneumococcal Vaccine 65+ (2 of 2 - PPSV23) 11/18/2020   • DIABETIC FOOT EXAM  11/18/2020       <no information>  Last  "Completed Colonoscopy       Status Date      COLONOSCOPY Done 7/31/2017 Ext Proc: TX COLSC FLX W/RMVL OF TUMOR POLYP LESION SNARE TQ     Patient has more history with this topic...        Immunization History   Administered Date(s) Administered   • FLUAD TRI 65YR+ 11/18/2019   • Flu Vaccine Quad PF >18YRS 11/13/2017   • Flu Vaccine Quad PF >36MO 11/13/2017   • Fluad Quad 65+ 11/18/2019   • Fluzone High Dose =>65 Years ([employer] ONLY) 10/23/2015, 10/31/2016, 10/31/2018   • Influenza Quad Vaccine (Inpatient) 11/13/2017   • Pneumococcal Conjugate 13-Valent (PCV13) 11/18/2019     Last Completed Mammogram       Status Date      MAMMOGRAM Done 12/9/2015 Ext Proc: TX SCREENINGMAMMOGRAPHYDIGITAL     Patient has more history with this topic...            FAMILY HISTORY:  Family History   Problem Relation Age of Onset   • Colon cancer Neg Hx    • Colon polyps Neg Hx      SOCIAL HISTORY:  Social History     Socioeconomic History   • Marital status:      Spouse name: Not on file   • Number of children: Not on file   • Years of education: Not on file   • Highest education level: Not on file   Occupational History   • Occupation: retired   Tobacco Use   • Smoking status: Never Smoker   • Smokeless tobacco: Never Used   Substance and Sexual Activity   • Alcohol use: No   • Drug use: No   • Sexual activity: Defer       REVIEW OF SYSTEMS:    Review of Systems   Constitutional: Negative for chills, fatigue and fever.        \"I feel good.\"  Tolerating Femara.    HENT: Negative for congestion, hearing loss and trouble swallowing.    Eyes: Negative for redness and visual disturbance.   Respiratory: Negative for cough, shortness of breath and wheezing.    Cardiovascular: Negative for chest pain and palpitations.   Gastrointestinal: Negative for abdominal pain, constipation, diarrhea, nausea and vomiting.   Endocrine: Negative for cold intolerance and heat intolerance.   Genitourinary: Negative for difficulty urinating, dysuria, " "hematuria and vaginal bleeding.   Musculoskeletal: Negative for gait problem and joint swelling.   Skin: Negative for pallor.   Allergic/Immunologic: Negative for food allergies.   Neurological: Negative for dizziness, speech difficulty and weakness.   Hematological: Negative for adenopathy. Does not bruise/bleed easily.   Psychiatric/Behavioral: Negative for agitation and confusion. The patient is not nervous/anxious.        VITAL SIGNS: /72   Pulse 71   Temp 97.6 °F (36.4 °C)   Resp 18   Ht 154.9 cm (61\")   Wt 91 kg (200 lb 11.2 oz)   SpO2 97%   Breastfeeding No   BMI 37.92 kg/m²   Pain Score    12/17/20 0956   PainSc: 0-No pain       PHYSICAL EXAMINATION:     Physical Exam  Vitals signs reviewed.   Constitutional:       Appearance: She is obese. She is not diaphoretic.   HENT:      Head: Normocephalic and atraumatic.   Eyes:      General: No scleral icterus.  Neck:      Musculoskeletal: Neck supple.   Cardiovascular:      Rate and Rhythm: Normal rate and regular rhythm.   Pulmonary:      Effort: No respiratory distress.      Breath sounds: No wheezing or rales.      Comments: Bilateral mastectomy scars. Examined with Gabby.  Abdominal:      General: Bowel sounds are normal.      Palpations: Abdomen is soft.      Tenderness: There is no abdominal tenderness.   Musculoskeletal:         General: No swelling.   Skin:     General: Skin is warm and dry.      Coloration: Skin is not pale.   Neurological:      Mental Status: She is alert and oriented to person, place, and time.   Psychiatric:         Mood and Affect: Mood normal.         Behavior: Behavior normal.         Thought Content: Thought content normal.         Judgment: Judgment normal.         LABS    Lab Results - Last 18 Months   Lab Units 12/10/20  1233 09/16/20  0826 08/12/20  0836 07/15/20  0835 06/17/20  0834 05/20/20  0834 04/22/20  0841   HEMOGLOBIN g/dL 12.1 12.2 12.1 11.6* 12.1 12.6 11.8*   HEMATOCRIT % 36.0 35.2 35.7 34.0 35.1 36.5 " 34.9   MCV fL 84.7 85.9 84.8 84.6 83.6 84.5 82.7   WBC 10*3/mm3 6.85 6.71 7.71 7.09 7.05 7.39 7.01   RDW % 13.6 13.0 12.9 13.2 13.3 13.7 13.2   MPV fL 9.2  --  9.0 9.0 9.2 8.8 9.0   PLATELETS 10*3/mm3 272 283 257 265 268 262 275   IMM GRAN % % 0.4  --  0.8* 0.6* 0.3 0.5 0.6*   NEUTROS ABS 10*3/mm3 4.37 4.38 4.89 4.45 4.72 4.78 4.68   LYMPHS ABS 10*3/mm3 1.64 1.57 1.75 1.62 1.46 1.64 1.44   MONOS ABS 10*3/mm3 0.48 0.43 0.57 0.56 0.54 0.61 0.52   EOS ABS 10*3/mm3 0.28 0.26 0.40 0.38 0.28 0.28 0.28   BASOS ABS 10*3/mm3 0.05 0.04 0.04 0.04 0.03 0.04 0.05   IMMATURE GRANS (ABS) 10*3/mm3 0.03  --  0.06* 0.04 0.02 0.04 0.04   NRBC /100 WBC 0.0 0.0 0.0 0.0 0.0 0.0 0.0       Lab Results - Last 18 Months   Lab Units 12/10/20  1233 10/26/20  1015 09/16/20  0826 08/12/20  0836 07/15/20  0835 04/15/20  0837 03/10/20  0808   GLUCOSE mg/dL 119* 116*  --  109* 118* 108* 110*   SODIUM mmol/L 138 136 133* 134* 138 137 138   POTASSIUM mmol/L 4.0 3.8 4.8 4.1 3.8 3.8 4.4   TOTAL CO2 mmol/L  --   --  26.3  --   --   --   --    CO2 mmol/L 27.0 27.0  --  26.0 24.0 27.0 25.0   CHLORIDE mmol/L 102 100 98 94* 97* 97* 103   ANION GAP mmol/L 9.0 9.0  --  14.0 17.0* 13.0 10.0   CREATININE mg/dL 1.50* 1.58* 1.39* 1.64* 1.40* 2.17* 1.21*   BUN mg/dL 28* 23 25* 25* 28* 45* 17   BUN / CREAT RATIO  18.7 14.6 18.0 15.2 20.0 20.7 14.0   CALCIUM mg/dL 9.2 10.2 9.8 9.6 10.3 10.3 9.4   EGFR IF NONAFRICN AM mL/min/1.73 34* 32* 37* 31* 37* 22* 44*   ALK PHOS U/L 69 71 71 65 59 67 69   TOTAL PROTEIN g/dL 7.1 7.1  --  7.0 6.9 7.2 6.9   ALT (SGPT) U/L 10 11 9 10 15 10 13   AST (SGOT) U/L 14 15 13 13 15 15 19   BILIRUBIN mg/dL 0.4 0.6 0.5 0.4 0.3 0.4 0.4   ALBUMIN g/dL 4.10 4.10 4.40 4.20 4.10 4.20 3.90   GLOBULIN gm/dL 3.0 3.0  --  2.8 2.8 3.0 3.0       Lab Results - Last 18 Months   Lab Units 12/10/20  1233 08/12/20  0836 04/15/20  0837 03/10/20  0808 11/12/19  0923 07/12/19  0916   CEA ng/mL 1.34 1.51 1.50 1.48 1.51 1.71       Lab Results - Last 18 Months    Lab Units 09/16/20  0826 07/15/20  0835 06/17/20  0834 05/20/20  0834 04/22/20  0841 11/13/19  0950   IRON mcg/dL  --  49 61 104 52  --    TIBC mcg/dL  --  331 352 386 352  --    IRON SATURATION %  --  15* 17* 27 15*  --    FERRITIN ng/mL  --  113.00 152.10* 150.70* 153.30*  --    TSH uIU/mL 3.410  --   --   --   --  1.970   FOLATE ng/mL  --   --   --   --  7.92  --        Cee S Kb reports a pain score of 0.        Patient's Body mass index is 37.92 kg/m². BMI is above normal parameters. Recommendations include: referral to primary care.      ASSESSMENT:  1.   Carcinoma of the right breast, infiltrating lobular.  12 o'clock. Low recurrence score of 6, absolute benefit of chemo is less than 1%:  Current Stage: AJCC Stage IA (T1c N0, M0, G1)   Tumor size 1.91 cm.  Hormone Status: ER 98%, CO 92%, HER-2/jeremy negative by FISH.  Baseline Node Status: 0/2 positive.  Treatment status: Post bilateral mastectomy and right sentinel node biopsy.  On adjuvant Femara 2.5 mg 03/22/2019 through present.  Plan for 10 years.   2.   Normocytic anemia from chronic kidney disease Stage IV, GFR 22 mL/minute.   3.   Chronic kidney disease Stage IV, GFR 22 mL/minute 04/15/2020.  Erythropoietin 16.9 on 04/24/2020.  4.   Osteopenia. On Prolia.  5.   Performance status of 0.   6.   Obesity BMI 37.9.            PLAN:  1.    Re:  Good tolerance to adjuvant Femara.   2.    Re:  Heme status.  Hemoglobin 12.1.  3.    Re:  Pre-office CMP.  GFR 34 mL/minute.  4.    Re:  Pre-office CEA.  Normal at 1.34.  5.    Re:  Pre-office CA27-29.  Normal at 22.4.   6.   Retacrit 40,000 units subcutaneous weekly if hemoglobin below 10 and hematocrit below 30 to target a hemoglobin of of 11 and hematocrit of 33.  Move CBC with differential weekly if she starts Retacrit.  Observe for adverse ill effects.  7.   Schedule Prolia 60 mg subcutaneously every 6 months.  Observe for potential osteonecrosis of the jaw.  8.    Schedule bone density 03/2021 to follow osteopenia.  9.   Refer to PCP for obesity.  10. Continue ongoing management per primary care physician and other specialists.  11. Plan of care discussed with patient.  Understanding expressed.  Patient agreeable to proceed.  12. Advance Care Planning  ACP discussion was declined by the patient. Patient does not have an advance directive, information provided.  13. Continue currently identified medications except oral iron.  14. eRx for Femara 2.5 mg p.o. daily, 90, 3 refills if needed.  Stop and call if intolerant.  Observe for potential bone loss, thrombosis, and hot flashes.  Plan for 10 years.  High benefit for extended therapy.   15. Return to office in 4 months with pre-office CBC with differential, CEA, CA27-29, and CMP.         I spent 27 total minutes, face-to-face, caring for Cee today.  Greater than 50% of this time involved counseling and/or coordination of care as documented within this note regarding the patient's illness(es), pros and cons of various treatment options, instructions and/or risk reduction.         MD Suha Jones APRN Thomas Staton, MD

## 2020-12-11 LAB — CANCER AG27-29 SERPL-ACNC: 22.4 U/ML (ref 0–38.6)

## 2020-12-17 ENCOUNTER — OFFICE VISIT (OUTPATIENT)
Dept: ONCOLOGY | Facility: CLINIC | Age: 75
End: 2020-12-17

## 2020-12-17 VITALS
TEMPERATURE: 97.6 F | SYSTOLIC BLOOD PRESSURE: 140 MMHG | DIASTOLIC BLOOD PRESSURE: 72 MMHG | OXYGEN SATURATION: 97 % | HEART RATE: 71 BPM | BODY MASS INDEX: 37.89 KG/M2 | RESPIRATION RATE: 18 BRPM | WEIGHT: 200.7 LBS | HEIGHT: 61 IN

## 2020-12-17 DIAGNOSIS — M85.851 OSTEOPENIA OF NECK OF RIGHT FEMUR: Primary | ICD-10-CM

## 2020-12-17 DIAGNOSIS — C50.211 MALIGNANT NEOPLASM OF UPPER-INNER QUADRANT OF RIGHT BREAST IN FEMALE, ESTROGEN RECEPTOR POSITIVE (HCC): ICD-10-CM

## 2020-12-17 DIAGNOSIS — Z17.0 MALIGNANT NEOPLASM OF UPPER-INNER QUADRANT OF RIGHT BREAST IN FEMALE, ESTROGEN RECEPTOR POSITIVE (HCC): ICD-10-CM

## 2020-12-17 PROCEDURE — 99214 OFFICE O/P EST MOD 30 MIN: CPT | Performed by: INTERNAL MEDICINE

## 2021-02-25 ENCOUNTER — OFFICE VISIT (OUTPATIENT)
Dept: FAMILY MEDICINE CLINIC | Facility: CLINIC | Age: 76
End: 2021-02-25

## 2021-02-25 VITALS
HEART RATE: 64 BPM | SYSTOLIC BLOOD PRESSURE: 134 MMHG | BODY MASS INDEX: 38.51 KG/M2 | RESPIRATION RATE: 16 BRPM | HEIGHT: 61 IN | OXYGEN SATURATION: 98 % | DIASTOLIC BLOOD PRESSURE: 82 MMHG | WEIGHT: 204 LBS

## 2021-02-25 DIAGNOSIS — E11.22 TYPE 2 DIABETES MELLITUS WITH STAGE 3 CHRONIC KIDNEY DISEASE, WITHOUT LONG-TERM CURRENT USE OF INSULIN, UNSPECIFIED WHETHER STAGE 3A OR 3B CKD (HCC): ICD-10-CM

## 2021-02-25 DIAGNOSIS — E78.2 MIXED HYPERLIPIDEMIA: ICD-10-CM

## 2021-02-25 DIAGNOSIS — C50.211 MALIGNANT NEOPLASM OF UPPER-INNER QUADRANT OF RIGHT BREAST IN FEMALE, ESTROGEN RECEPTOR POSITIVE (HCC): ICD-10-CM

## 2021-02-25 DIAGNOSIS — N18.30 TYPE 2 DIABETES MELLITUS WITH STAGE 3 CHRONIC KIDNEY DISEASE, WITHOUT LONG-TERM CURRENT USE OF INSULIN, UNSPECIFIED WHETHER STAGE 3A OR 3B CKD (HCC): ICD-10-CM

## 2021-02-25 DIAGNOSIS — I10 ESSENTIAL HYPERTENSION: Primary | ICD-10-CM

## 2021-02-25 DIAGNOSIS — Z17.0 MALIGNANT NEOPLASM OF UPPER-INNER QUADRANT OF RIGHT BREAST IN FEMALE, ESTROGEN RECEPTOR POSITIVE (HCC): ICD-10-CM

## 2021-02-25 PROCEDURE — 99214 OFFICE O/P EST MOD 30 MIN: CPT | Performed by: FAMILY MEDICINE

## 2021-02-25 NOTE — PROGRESS NOTES
Subjective   Cee Quiles is a 76 y.o. female.     Chief Complaint   Patient presents with   • Follow-up     5 mo   htn       History of Present Illness     she nots good bp and bs checks--she is toeating lipitor witjhout myualis ---toleiang synthroid witout heat or cold intoances      Current Outpatient Medications:   •  atorvastatin (LIPITOR) 10 MG tablet, TAKE 1 TABLET BY MOUTH  DAILY, Disp: 90 tablet, Rfl: 3  •  Bystolic 20 MG tablet, TAKE 1 TABLET BY MOUTH  DAILY, Disp: 90 tablet, Rfl: 3  •  calcium citrate-vitamin d (CALCITRATE) 315-250 MG-UNIT tablet tablet, Take  by mouth Daily., Disp: , Rfl:   •  Denosumab (PROLIA SC), Inject  under the skin into the appropriate area as directed., Disp: , Rfl:   •  furosemide (LASIX) 20 MG tablet, Take 20 mg by mouth 2 (Two) Times a Day., Disp: , Rfl:   •  glipizide (GLUCOTROL XL) 2.5 MG 24 hr tablet, TAKE 1 TABLET BY MOUTH  DAILY, Disp: 90 tablet, Rfl: 3  •  letrozole (FEMARA) 2.5 MG tablet, TAKE 1 TABLET BY MOUTH  DAILY, Disp: 90 tablet, Rfl: 3  •  levothyroxine (SYNTHROID, LEVOTHROID) 100 MCG tablet, TAKE 1 TABLET BY MOUTH  DAILY, Disp: 90 tablet, Rfl: 3  •  meclizine (ANTIVERT) 25 MG tablet, Take 25 mg by mouth 3 (Three) Times a Day As Needed for dizziness., Disp: , Rfl:   •  valsartan (DIOVAN) 320 MG tablet, TAKE 1 TABLET BY MOUTH  DAILY, Disp: 90 tablet, Rfl: 3  Allergies   Allergen Reactions   • Keflex [Cephalexin] Anaphylaxis   • Lortab [Hydrocodone-Acetaminophen] Nausea Only       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, left     WRIST   • Cancer (CMS/HCC)     LOBULAR RIGHT BREAST   • Chronic knee pain     BILATERAL   • Colon polyp    • Cyst of ovary, left    • Diabetes (CMS/HCC)    • Diabetes mellitus (CMS/HCC)    • Dizziness    • Elevated cholesterol    • Gallstones    • Hx of colonic polyps    • Hyperlipidemia    • Hypertension    • Hypertension    • Hypothyroid    • Kidney disease    • Long term  "(current) use of aromatase inhibitors 4/23/2020   • Malignant neoplasm of upper-inner quadrant of right breast in female, estrogen receptor positive (CMS/HCC) 4/23/2020   • Osteopenia of neck of femur 4/23/2020   • PONV (postoperative nausea and vomiting)    • Torn meniscus      Past Surgical History:   Procedure Laterality Date   • CHOLECYSTECTOMY     • COLONOSCOPY W/ POLYPECTOMY  02/08/2012    Pedunculated polyp cecal pit, Hyperplastic polyp at 15 cm repeat exam in 5 years   • KNEE SURGERY Right    • MASTECTOMY W/ SENTINEL NODE BIOPSY Bilateral 2/12/2019    Procedure: BILATERAL MASTECTOMY WITH RIGHT BREAST SENTINEL LYMPH NODE BIOPSY - RADIOLOGIST WILL INJECT;  Surgeon: Michael Hodges MD;  Location: [facility] OR;  Service: General   • OVARIAN CYST DRAINAGE Left    • TUMOR REMOVAL Left        Review of Systems   Constitutional: Negative.    HENT: Negative.    Eyes: Negative.    Respiratory: Negative.    Cardiovascular: Negative.    Gastrointestinal: Negative.    Endocrine: Negative.    Genitourinary: Negative.    Musculoskeletal: Negative.    Skin: Negative.    Allergic/Immunologic: Negative.    Neurological: Negative.    Hematological: Negative.    Psychiatric/Behavioral: Negative.        Objective  /82   Pulse 64   Resp 16   Ht 154.9 cm (61\")   Wt 92.5 kg (204 lb)   SpO2 98%   BMI 38.55 kg/m²   Physical Exam  Vitals signs and nursing note reviewed.   Constitutional:       Appearance: Normal appearance. She is normal weight.   HENT:      Head: Normocephalic and atraumatic.      Right Ear: Ear canal normal.      Left Ear: Ear canal normal.      Nose: Nose normal.      Mouth/Throat:      Mouth: Mucous membranes are moist.      Pharynx: Oropharynx is clear.   Eyes:      Extraocular Movements: Extraocular movements intact.      Conjunctiva/sclera: Conjunctivae normal.      Pupils: Pupils are equal, round, and reactive to light.   Neck:      Musculoskeletal: Normal range of motion and neck supple. "   Cardiovascular:      Rate and Rhythm: Normal rate and regular rhythm.      Pulses: Normal pulses.      Heart sounds: Normal heart sounds.   Pulmonary:      Effort: Pulmonary effort is normal.      Breath sounds: Normal breath sounds.   Abdominal:      General: Abdomen is flat. Bowel sounds are normal.      Palpations: Abdomen is soft.   Musculoskeletal: Normal range of motion.   Skin:     General: Skin is warm and dry.      Capillary Refill: Capillary refill takes less than 2 seconds.   Neurological:      General: No focal deficit present.      Mental Status: She is alert and oriented to person, place, and time. Mental status is at baseline.   Psychiatric:         Mood and Affect: Mood normal.         Behavior: Behavior normal.         Thought Content: Thought content normal.         Judgment: Judgment normal.     Diabetic foot exam:   Left: Filament test present   Pulses Dorsalis Pedis:  present   Reflexes 3+    Vibratory sensation normal   Proprioception normal   Sharp/dull discrimination normal       Right: Filament test present   Pulses Dorsalis Pedis:  present   Reflexes 3+    Vibratory sensation normal   Proprioception normal   Sharp/dull discrimination normal      Assessment/Plan   Diagnoses and all orders for this visit:    1. Essential hypertension (Primary)  -     TSH  -     Lipid Panel With / Chol / HDL Ratio  -     Hemoglobin A1c    2. Mixed hyperlipidemia  -     TSH  -     Lipid Panel With / Chol / HDL Ratio  -     Hemoglobin A1c    3. Type 2 diabetes mellitus with stage 3 chronic kidney disease, without long-term current use of insulin, unspecified whether stage 3a or 3b CKD (CMS/HCC)  -     TSH  -     Lipid Panel With / Chol / HDL Ratio  -     Hemoglobin A1c    4. Malignant neoplasm of upper-inner quadrant of right breast in female, estrogen receptor positive (CMS/HCC)      She will moniotor bp and bs at home           Orders Placed This Encounter   Procedures   • TSH   • Lipid Panel With / Chol /  HDL Ratio   • Hemoglobin A1c       Follow up: 6 month(s)

## 2021-02-26 LAB
CHOLEST SERPL-MCNC: 156 MG/DL (ref 0–200)
CHOLEST/HDLC SERPL: 4 {RATIO}
HBA1C MFR BLD: 5.2 % (ref 4.8–5.6)
HDLC SERPL-MCNC: 39 MG/DL (ref 40–60)
LDLC SERPL CALC-MCNC: 96 MG/DL (ref 0–100)
TRIGL SERPL-MCNC: 114 MG/DL (ref 0–150)
TSH SERPL DL<=0.005 MIU/L-ACNC: 1.88 UIU/ML (ref 0.27–4.2)
VLDLC SERPL CALC-MCNC: 21 MG/DL (ref 5–40)

## 2021-03-30 ENCOUNTER — HOSPITAL ENCOUNTER (OUTPATIENT)
Dept: BONE DENSITY | Facility: HOSPITAL | Age: 76
Discharge: HOME | End: 2021-03-30
Admitting: INTERNAL MEDICINE

## 2021-03-30 DIAGNOSIS — Z17.0 MALIGNANT NEOPLASM OF UPPER-INNER QUADRANT OF RIGHT BREAST IN FEMALE, ESTROGEN RECEPTOR POSITIVE (HCC): ICD-10-CM

## 2021-03-30 DIAGNOSIS — C50.211 MALIGNANT NEOPLASM OF UPPER-INNER QUADRANT OF RIGHT BREAST IN FEMALE, ESTROGEN RECEPTOR POSITIVE (HCC): ICD-10-CM

## 2021-03-30 DIAGNOSIS — M85.851 OSTEOPENIA OF NECK OF RIGHT FEMUR: ICD-10-CM

## 2021-03-30 PROCEDURE — 77080 DXA BONE DENSITY AXIAL: CPT

## 2021-04-08 ENCOUNTER — LAB (OUTPATIENT)
Dept: LAB | Facility: HOSPITAL | Age: 76
End: 2021-04-08

## 2021-04-08 DIAGNOSIS — C50.211 MALIGNANT NEOPLASM OF UPPER-INNER QUADRANT OF RIGHT BREAST IN FEMALE, ESTROGEN RECEPTOR POSITIVE (HCC): ICD-10-CM

## 2021-04-08 DIAGNOSIS — Z17.0 MALIGNANT NEOPLASM OF UPPER-INNER QUADRANT OF RIGHT BREAST IN FEMALE, ESTROGEN RECEPTOR POSITIVE (HCC): ICD-10-CM

## 2021-04-08 DIAGNOSIS — M85.851 OSTEOPENIA OF NECK OF RIGHT FEMUR: ICD-10-CM

## 2021-04-08 LAB
ALBUMIN SERPL-MCNC: 3.9 G/DL (ref 3.5–5.2)
ALBUMIN/GLOB SERPL: 1.2 G/DL
ALP SERPL-CCNC: 66 U/L (ref 39–117)
ALT SERPL W P-5'-P-CCNC: 9 U/L (ref 1–33)
ANION GAP SERPL CALCULATED.3IONS-SCNC: 11 MMOL/L (ref 5–15)
AST SERPL-CCNC: 17 U/L (ref 1–32)
BASOPHILS # BLD AUTO: 0.04 10*3/MM3 (ref 0–0.2)
BASOPHILS NFR BLD AUTO: 0.6 % (ref 0–1.5)
BILIRUB SERPL-MCNC: 0.6 MG/DL (ref 0–1.2)
BUN SERPL-MCNC: 27 MG/DL (ref 8–23)
BUN/CREAT SERPL: 20 (ref 7–25)
CALCIUM SPEC-SCNC: 9.6 MG/DL (ref 8.6–10.5)
CEA SERPL-MCNC: 1.55 NG/ML
CHLORIDE SERPL-SCNC: 100 MMOL/L (ref 98–107)
CO2 SERPL-SCNC: 27 MMOL/L (ref 22–29)
CREAT SERPL-MCNC: 1.35 MG/DL (ref 0.57–1)
DEPRECATED RDW RBC AUTO: 39.5 FL (ref 37–54)
EOSINOPHIL # BLD AUTO: 0.34 10*3/MM3 (ref 0–0.4)
EOSINOPHIL NFR BLD AUTO: 5.1 % (ref 0.3–6.2)
ERYTHROCYTE [DISTWIDTH] IN BLOOD BY AUTOMATED COUNT: 13.2 % (ref 12.3–15.4)
GFR SERPL CREATININE-BSD FRML MDRD: 38 ML/MIN/1.73
GLOBULIN UR ELPH-MCNC: 3.3 GM/DL
GLUCOSE SERPL-MCNC: 107 MG/DL (ref 65–99)
HCT VFR BLD AUTO: 34.3 % (ref 34–46.6)
HGB BLD-MCNC: 12.1 G/DL (ref 12–15.9)
IMM GRANULOCYTES # BLD AUTO: 0.03 10*3/MM3 (ref 0–0.05)
IMM GRANULOCYTES NFR BLD AUTO: 0.4 % (ref 0–0.5)
LYMPHOCYTES # BLD AUTO: 1.51 10*3/MM3 (ref 0.7–3.1)
LYMPHOCYTES NFR BLD AUTO: 22.6 % (ref 19.6–45.3)
MCH RBC QN AUTO: 28.9 PG (ref 26.6–33)
MCHC RBC AUTO-ENTMCNC: 35.3 G/DL (ref 31.5–35.7)
MCV RBC AUTO: 81.9 FL (ref 79–97)
MONOCYTES # BLD AUTO: 0.43 10*3/MM3 (ref 0.1–0.9)
MONOCYTES NFR BLD AUTO: 6.4 % (ref 5–12)
NEUTROPHILS NFR BLD AUTO: 4.32 10*3/MM3 (ref 1.7–7)
NEUTROPHILS NFR BLD AUTO: 64.9 % (ref 42.7–76)
NRBC BLD AUTO-RTO: 0 /100 WBC (ref 0–0.2)
PLATELET # BLD AUTO: 240 10*3/MM3 (ref 140–450)
PMV BLD AUTO: 9.2 FL (ref 6–12)
POTASSIUM SERPL-SCNC: 3.7 MMOL/L (ref 3.5–5.2)
PROT SERPL-MCNC: 7.2 G/DL (ref 6–8.5)
RBC # BLD AUTO: 4.19 10*6/MM3 (ref 3.77–5.28)
SODIUM SERPL-SCNC: 138 MMOL/L (ref 136–145)
WBC # BLD AUTO: 6.67 10*3/MM3 (ref 3.4–10.8)

## 2021-04-08 PROCEDURE — 80053 COMPREHEN METABOLIC PANEL: CPT

## 2021-04-08 PROCEDURE — 85025 COMPLETE CBC W/AUTO DIFF WBC: CPT

## 2021-04-08 PROCEDURE — 36415 COLL VENOUS BLD VENIPUNCTURE: CPT

## 2021-04-08 PROCEDURE — 82378 CARCINOEMBRYONIC ANTIGEN: CPT

## 2021-04-08 PROCEDURE — 86300 IMMUNOASSAY TUMOR CA 15-3: CPT

## 2021-04-08 NOTE — PROGRESS NOTES
[facility]  [address]  [facility] [id number] [phone]    Patient Name: Cee Quiles  Encounter Date: 04/15/2021  YOB: 1945  Patient Number: [id number]      REASON FOR FOLLOW-UP: Cee Quiles is a pleasant 76-year-old  female who is seen on followup for Stage IA right breast cancer, receptor positive, and HER-2/jeremy negative.  Low recurrence score of 6, absolute benefit of chemo is less than 1%.   She is on adjuvant Femara for the past 24.5 months. She is also seen for anemia from iron deficiency and chronic kidney disease.  She is off iron for the past 8 months.  She is seen alone.  She is a reliable historian.       She had a follow-up with Ms. Suha Mirza, BRAVO 12/08/2020.  GFR 39 mL/min.        Oncology/Hematology History Overview Note   DIAGNOSTIC ABNORMALITIES:  The patient was found to have a right breast mass by mammogram at [facility].   Right breast core biopsy 01/17/2019 showed infiltrating lobular carcinoma, grade 1. Greatest dimension of tumor measured 11.1 mm. ER 98%, NC 92%, HER-2/jeremy +1 and negative FISH, and Ki-67 at 7%,. Oncotype DX report. Low recurrence score of 6, absolute benefit of chemo is less than 1%  Breast MRI [facility] 01/30/2019, 1.8 x 0.7 x 0.6 cm, non mass-like linear enhancement at 1 o'clock. Second suspicious area of enhancement in the right breast just behind the nipple measuring 1 x 1 x 0.9 cm. Two areas of linear non mass-like enhancement has anterior depth at 1 o'clock in the left breast.   The patient was seen by Dr. Hodges 02/01/2019. Planned for bilateral mastectomy and sentinel lymph node evaluation.   Ultrasound biopsy left breast showed atypical lobular hyperplasia. No histologic evidence of malignancy.   The patient had a followup with Dr. Hodges 02/13/2019.  CBC 02/13/2019 revealed a WBC of 10.6, hemoglobin 12.2,  hematocrit 36.8, MCV 86.4, platelet count 211,000 with ANC of 9.4. CMP remarkable for a GFR of 46 mL/minute.  Pathology report 02/15/2019 left breast showed no evidence of in situ or invasive carcinoma. Right breast showed an infiltrating lobular carcinoma, grade 1 with residual 8 mm.       PREVIOUS INTERVENTIONS:   Bilateral mastectomy with right breast sentinel lymph node biopsy on 02/12/2019.  Adjuvant Femara 2.5 mg p.o. daily 03/22/2019 through present.      PREVIOUS INTERVENTIONS:  Ferrous sulfate 325 mg daily 04/22/2020 through 05/20/2020.  Resume 07/17/2020 through 08/20/2020.     Breast CA (CMS/HCC)   2/12/2019 Initial Diagnosis    Breast CA (CMS/HCC)     Malignant neoplasm of upper-inner quadrant of right breast in female, estrogen receptor positive (CMS/HCC)   4/23/2020 Initial Diagnosis    Malignant neoplasm of upper-inner quadrant of right breast in female, estrogen receptor positive (CMS/HCC)     4/24/2020 -  Chemotherapy    OP SUPPORTIVE Denosumab (Prolia) Q6M         PAST MEDICAL HISTORY:  ALLERGIES:  Allergies   Allergen Reactions   • Keflex [Cephalexin] Anaphylaxis   • Lortab [Hydrocodone-Acetaminophen] Nausea Only     CURRENT MEDICATIONS:  Outpatient Encounter Medications as of 4/15/2021   Medication Sig Dispense Refill   • atorvastatin (LIPITOR) 10 MG tablet TAKE 1 TABLET BY MOUTH  DAILY 90 tablet 3   • Bystolic 20 MG tablet TAKE 1 TABLET BY MOUTH  DAILY 90 tablet 3   • calcium citrate-vitamin d (CALCITRATE) 315-250 MG-UNIT tablet tablet Take  by mouth Daily.     • Denosumab (PROLIA SC) Inject  under the skin into the appropriate area as directed.     • furosemide (LASIX) 20 MG tablet Take 20 mg by mouth 2 (Two) Times a Day.     • glipizide (GLUCOTROL XL) 2.5 MG 24 hr tablet TAKE 1 TABLET BY MOUTH  DAILY 90 tablet 3   • letrozole (FEMARA) 2.5 MG tablet TAKE 1 TABLET BY MOUTH  DAILY 90 tablet 3   • levothyroxine (SYNTHROID, LEVOTHROID) 100 MCG tablet TAKE 1 TABLET BY MOUTH  DAILY 90 tablet 3   •  meclizine (ANTIVERT) 25 MG tablet Take 25 mg by mouth 3 (Three) Times a Day As Needed for dizziness.     • valsartan (DIOVAN) 320 MG tablet TAKE 1 TABLET BY MOUTH  DAILY 90 tablet 3     No facility-administered encounter medications on file as of 4/15/2021.     ADULT ILLNESSES:  Patient Active Problem List   Diagnosis Code   • Type 2 diabetes mellitus with diabetic chronic kidney disease (CMS/[facility]) E11.22   • Essential hypertension I10   • Acquired hypothyroidism E03.9   • Chronic pain of right knee M25.561, G89.29   • CKD (chronic kidney disease) stage 3, GFR 30-59 ml/min (CMS/[facility]) N18.30   • Mixed hyperlipidemia E78.2   • Morbidly obese (CMS/[facility]) E66.01   • Breast CA (CMS/[facility]) C50.919   • Postmenopausal Z78.0   • Hypomagnesemia E83.42   • Osteopenia of neck of femur M85.859   • Malignant neoplasm of upper-inner quadrant of right breast in female, estrogen receptor positive (CMS/[facility]) C50.211, Z17.0   • Long term (current) use of aromatase inhibitors Z79.811     SURGERIES:  Past Surgical History:   Procedure Laterality Date   • CHOLECYSTECTOMY     • COLONOSCOPY W/ POLYPECTOMY  02/08/2012    Pedunculated polyp cecal pit, Hyperplastic polyp at 15 cm repeat exam in 5 years   • KNEE SURGERY Right    • MASTECTOMY W/ SENTINEL NODE BIOPSY Bilateral 2/12/2019    Procedure: BILATERAL MASTECTOMY WITH RIGHT BREAST SENTINEL LYMPH NODE BIOPSY - RADIOLOGIST WILL INJECT;  Surgeon: Michael Hodges MD;  Location: [facility] OR;  Service: General   • OVARIAN CYST DRAINAGE Left    • TUMOR REMOVAL Left      HEALTH MAINTENANCE ITEMS:  Health Maintenance Due   Topic Date Due   • COVID-19 Vaccine (1) Never done   • TDAP/TD VACCINES (1 - Tdap) Never done   • ZOSTER VACCINE (1 of 2) Never done   • HEPATITIS C SCREENING  Never done   • Pneumococcal Vaccine 65+ (2 of 2 - PPSV23) 11/18/2020       <no information>  Last Completed Colonoscopy       Status Date      COLONOSCOPY Done 7/31/2017 Ext Proc: [place] COLSC FLX W/RMVL OF TUMOR POLYP LESION SNARE  "TQ     Patient has more history with this topic...        Immunization History   Administered Date(s) Administered   • FLUAD TRI 65YR+ 11/18/2019   • Flu Vaccine Quad PF >18YRS 11/13/2017   • Flu Vaccine Quad PF >36MO 11/13/2017   • Fluad Quad 65+ 11/18/2019   • Fluzone High Dose =>65 Years ([employer] ONLY) 10/23/2015, 10/31/2016, 10/31/2018   • Influenza Quad Vaccine (Inpatient) 11/13/2017   • Pneumococcal Conjugate 13-Valent (PCV13) 11/18/2019     Last Completed Mammogram       Status Date      MAMMOGRAM This plan is no longer active.      Done 12/9/2015 Ext Proc: [place] SCREENINGMAMMOGRAPHYDIGITAL     Patient has more history with this topic...            FAMILY HISTORY:  Family History   Problem Relation Age of Onset   • Colon cancer Neg Hx    • Colon polyps Neg Hx      SOCIAL HISTORY:  Social History     Socioeconomic History   • Marital status:      Spouse name: Not on file   • Number of children: Not on file   • Years of education: Not on file   • Highest education level: Not on file   Tobacco Use   • Smoking status: Never Smoker   • Smokeless tobacco: Never Used   Substance and Sexual Activity   • Alcohol use: No   • Drug use: No   • Sexual activity: Defer       REVIEW OF SYSTEMS:    Review of Systems   Constitutional: Negative for chills, fatigue and fever.        \"I feel good.\"  Tolerating Femara.    HENT: Negative for congestion, hearing loss and trouble swallowing.    Eyes: Negative for redness and visual disturbance.   Respiratory: Negative for cough, shortness of breath and wheezing.    Cardiovascular: Negative for chest pain and leg swelling.   Gastrointestinal: Negative for blood in stool, constipation, diarrhea, nausea and vomiting.   Endocrine: Negative for cold intolerance and heat intolerance.   Genitourinary: Negative for difficulty urinating, dysuria and flank pain.   Musculoskeletal: Negative for gait problem and joint swelling.   Skin: Negative for pallor.   Allergic/Immunologic: Negative " "for food allergies.   Neurological: Negative for dizziness, speech difficulty and weakness.   Hematological: Negative for adenopathy. Does not bruise/bleed easily.   Psychiatric/Behavioral: Negative for agitation, confusion and hallucinations.       VITAL SIGNS: /74   Pulse 68   Temp 97.4 °F (36.3 °C)   Resp 18   Ht 154.9 cm (61\")   Wt 93.3 kg (205 lb 9.6 oz)   SpO2 97%   Breastfeeding No   BMI 38.85 kg/m²   Pain Score    04/15/21 1004   PainSc: 0-No pain       PHYSICAL EXAMINATION:     Physical Exam  Vitals reviewed.   Constitutional:       General: She is not in acute distress.     Appearance: She is obese.   HENT:      Head: Normocephalic and atraumatic.   Eyes:      General: No scleral icterus.  Cardiovascular:      Rate and Rhythm: Normal rate and regular rhythm.   Pulmonary:      Effort: No respiratory distress.      Breath sounds: No rales.      Comments: Bilateral mastectomy scars. Examined with Gabby.   Abdominal:      General: Bowel sounds are normal. There is no distension.      Palpations: Abdomen is soft.   Musculoskeletal:         General: No swelling.      Cervical back: Neck supple.   Skin:     General: Skin is warm and dry.      Coloration: Skin is not pale.   Neurological:      Mental Status: She is alert and oriented to person, place, and time.   Psychiatric:         Mood and Affect: Mood normal.         Behavior: Behavior normal.         Thought Content: Thought content normal.         Judgment: Judgment normal.         LABS    Lab Results - Last 18 Months   Lab Units 04/08/21  1115 12/10/20  1233 09/16/20  0826 08/12/20  0836 07/15/20  0835 06/17/20  0834 05/20/20  0834   HEMOGLOBIN g/dL 12.1 12.1 12.2 12.1 11.6* 12.1 12.6   HEMATOCRIT % 34.3 36.0 35.2 35.7 34.0 35.1 36.5   MCV fL 81.9 84.7 85.9 84.8 84.6 83.6 84.5   WBC 10*3/mm3 6.67 6.85 6.71 7.71 7.09 7.05 7.39   RDW % 13.2 13.6 13.0 12.9 13.2 13.3 13.7   MPV fL 9.2 9.2  --  9.0 9.0 9.2 8.8   PLATELETS 10*3/mm3 240 272 283 257 " 265 268 262   IMM GRAN % % 0.4 0.4  --  0.8* 0.6* 0.3 0.5   NEUTROS ABS 10*3/mm3 4.32 4.37 4.38 4.89 4.45 4.72 4.78   LYMPHS ABS 10*3/mm3 1.51 1.64 1.57 1.75 1.62 1.46 1.64   MONOS ABS 10*3/mm3 0.43 0.48 0.43 0.57 0.56 0.54 0.61   EOS ABS 10*3/mm3 0.34 0.28 0.26 0.40 0.38 0.28 0.28   BASOS ABS 10*3/mm3 0.04 0.05 0.04 0.04 0.04 0.03 0.04   IMMATURE GRANS (ABS) 10*3/mm3 0.03 0.03  --  0.06* 0.04 0.02 0.04   NRBC /100 WBC 0.0 0.0 0.0 0.0 0.0 0.0 0.0       Lab Results - Last 18 Months   Lab Units 04/08/21  1115 12/10/20  1233 10/26/20  1015 09/16/20  0826 08/12/20  0836 07/15/20  0835 04/15/20  0837   GLUCOSE mg/dL 107* 119* 116*  --  109* 118* 108*   SODIUM mmol/L 138 138 136 133* 134* 138 137   POTASSIUM mmol/L 3.7 4.0 3.8 4.8 4.1 3.8 3.8   TOTAL CO2 mmol/L  --   --   --  26.3  --   --   --    CO2 mmol/L 27.0 27.0 27.0  --  26.0 24.0 27.0   CHLORIDE mmol/L 100 102 100 98 94* 97* 97*   ANION GAP mmol/L 11.0 9.0 9.0  --  14.0 17.0* 13.0   CREATININE mg/dL 1.35* 1.50* 1.58* 1.39* 1.64* 1.40* 2.17*   BUN mg/dL 27* 28* 23 25* 25* 28* 45*   BUN / CREAT RATIO  20.0 18.7 14.6 18.0 15.2 20.0 20.7   CALCIUM mg/dL 9.6 9.2 10.2 9.8 9.6 10.3 10.3   EGFR IF NONAFRICN AM mL/min/1.73 38* 34* 32* 37* 31* 37* 22*   ALK PHOS U/L 66 69 71 71 65 59 67   TOTAL PROTEIN g/dL 7.2 7.1 7.1  --  7.0 6.9 7.2   ALT (SGPT) U/L 9 10 11 9 10 15 10   AST (SGOT) U/L 17 14 15 13 13 15 15   BILIRUBIN mg/dL 0.6 0.4 0.6 0.5 0.4 0.3 0.4   ALBUMIN g/dL 3.90 4.10 4.10 4.40 4.20 4.10 4.20   GLOBULIN gm/dL 3.3 3.0 3.0  --  2.8 2.8 3.0       Lab Results - Last 18 Months   Lab Units 04/08/21  1115 12/10/20  1233 08/12/20  0836 04/15/20  0837 03/10/20  0808 11/12/19  0923   CEA ng/mL 1.55 1.34 1.51 1.50 1.48 1.51       Lab Results - Last 18 Months   Lab Units 02/25/21  0839 09/16/20  0826 07/15/20  0835 06/17/20  0834 05/20/20  0834 04/22/20  0841 11/13/19  0950   IRON mcg/dL  --   --  49 61 104 52  --    TIBC mcg/dL  --   --  331 352 386 352  --    IRON  SATURATION %  --   --  15* 17* 27 15*  --    FERRITIN ng/mL  --   --  113.00 152.10* 150.70* 153.30*  --    TSH uIU/mL 1.880 3.410  --   --   --   --  1.970   FOLATE ng/mL  --   --   --   --   --  7.92  --        Cee Quiles reports a pain score of 0.     Patient's Body mass index is 38.85 kg/m². BMI is above normal parameters. Recommendations include: referral to primary care.      ASSESSMENT:  1.   Carcinoma of the right breast, infiltrating lobular.  12 o'clock. Low recurrence score of 6, absolute benefit of chemo is less than 1%:  Current Stage: AJCC Stage IA (T1c N0, M0, G1)   Tumor size 1.91 cm.  Hormone Status: ER 98%, OH 92%, HER-2/jeremy negative by FISH.  Baseline Node Status: 0/2 positive.  Treatment status: Post bilateral mastectomy and right sentinel node biopsy.  On adjuvant Femara 2.5 mg 03/22/2019 through present.  Plan for 10 years.   2.   Normocytic anemia from chronic kidney disease Stage IV, GFR 22 mL/minute.   3.   Chronic kidney disease Stage IV, GFR 22 mL/minute 04/15/2020.  Erythropoietin 16.9 on 04/24/2020.  4.   Osteopenia. On Prolia.  5.   Performance status of 0.   6.   Obesity BMI 38.85.            PLAN:  1.    Re:  Good tolerance to adjuvant Femara.  Bone density 03/30/2021, normal.  2.    Re:  Heme status.  Hemoglobin 12.1.  3.    Re:  Pre-office CMP.  GFR 38 mL/minute.  4.    Re:  Pre-office CEA at 1.55.  5.    Re:  Pre-office CA27-29 at 22.5.   6.   Retacrit 40,000 units subcutaneous weekly if hemoglobin below 10 and hematocrit below 30 to target a hemoglobin of of 11 and hematocrit of 33.  Move CBC with differential weekly if she starts Retacrit.  Observe for adverse ill effects.  7.   Schedule Prolia 60 mg subcutaneously every 6 months, bone density improved and still on Femara.  Monitor for potential osteonecrosis of the jaw.  8.   Next bone density 03/2023 to follow osteopenia.  9.   Refer to PCP for obesity.  10. Continue ongoing management  per primary care physician and other specialists.  11. Plan of care discussed with patient.  Understanding expressed.  Patient agreeable to proceed.  12. Advance Care Planning   ACP discussion was declined by the patient. Patient does not have an advance directive, information provided.  13. eRx for Femara 2.5 mg p.o. daily, 90, 3 refills if needed.  Stop and call if intolerant.  Monitor for potential bone loss, thrombosis, and hot flashes.  Plan for 10 years.  High benefit for extended therapy.   14. Return to office in 4 months with pre-office CBC with differential, CEA, CA27-29, and CMP.        I have reviewed the assessment and plan and verified the accuracy of it. No changes to assessment and plan since the information was documented. Andrea Cartwright MD 04/15/21        I spent 31 total minutes, face-to-face, caring for Cee today.  Greater than 50% of this time involved counseling and/or coordination of care as documented within this note regarding the patient's illness(es), pros and cons of various treatment options, instructions and/or risk reduction.         MD Suha Jones APRN Thomas Staton, MD

## 2021-04-09 LAB — CANCER AG27-29 SERPL-ACNC: 22.5 U/ML (ref 0–38.6)

## 2021-04-15 ENCOUNTER — OFFICE VISIT (OUTPATIENT)
Dept: ONCOLOGY | Facility: CLINIC | Age: 76
End: 2021-04-15

## 2021-04-15 VITALS
HEART RATE: 68 BPM | OXYGEN SATURATION: 97 % | HEIGHT: 61 IN | BODY MASS INDEX: 38.82 KG/M2 | TEMPERATURE: 97.4 F | WEIGHT: 205.6 LBS | RESPIRATION RATE: 18 BRPM | DIASTOLIC BLOOD PRESSURE: 74 MMHG | SYSTOLIC BLOOD PRESSURE: 130 MMHG

## 2021-04-15 DIAGNOSIS — C50.211 MALIGNANT NEOPLASM OF UPPER-INNER QUADRANT OF RIGHT BREAST IN FEMALE, ESTROGEN RECEPTOR POSITIVE (HCC): Primary | ICD-10-CM

## 2021-04-15 DIAGNOSIS — Z17.0 MALIGNANT NEOPLASM OF UPPER-INNER QUADRANT OF RIGHT BREAST IN FEMALE, ESTROGEN RECEPTOR POSITIVE (HCC): Primary | ICD-10-CM

## 2021-04-15 PROCEDURE — 99214 OFFICE O/P EST MOD 30 MIN: CPT | Performed by: INTERNAL MEDICINE

## 2021-04-29 ENCOUNTER — INFUSION (OUTPATIENT)
Dept: ONCOLOGY | Facility: HOSPITAL | Age: 76
End: 2021-04-29

## 2021-04-29 DIAGNOSIS — C50.211 MALIGNANT NEOPLASM OF UPPER-INNER QUADRANT OF RIGHT BREAST IN FEMALE, ESTROGEN RECEPTOR POSITIVE (HCC): ICD-10-CM

## 2021-04-29 DIAGNOSIS — Z79.811 LONG TERM (CURRENT) USE OF AROMATASE INHIBITORS: ICD-10-CM

## 2021-04-29 DIAGNOSIS — M85.851 OSTEOPENIA OF NECK OF RIGHT FEMUR: Primary | ICD-10-CM

## 2021-04-29 DIAGNOSIS — Z17.0 MALIGNANT NEOPLASM OF UPPER-INNER QUADRANT OF RIGHT BREAST IN FEMALE, ESTROGEN RECEPTOR POSITIVE (HCC): ICD-10-CM

## 2021-04-29 PROCEDURE — 96372 THER/PROPH/DIAG INJ SC/IM: CPT

## 2021-04-29 PROCEDURE — 25010000002 DENOSUMAB 60 MG/ML SOLUTION PREFILLED SYRINGE: Performed by: INTERNAL MEDICINE

## 2021-04-29 RX ADMIN — DENOSUMAB 60 MG: 60 INJECTION SUBCUTANEOUS at 13:40

## 2021-05-21 RX ORDER — VALSARTAN 320 MG/1
320 TABLET ORAL DAILY
Qty: 90 TABLET | Refills: 3 | Status: SHIPPED | OUTPATIENT
Start: 2021-05-21 | End: 2021-08-26

## 2021-08-08 NOTE — PROGRESS NOTES
[facility] ONC [facility]COLOGY  [address]  [facility] [address] [phone]    Patient Name: Cee Quiles  Encounter Date: 08/17/2021  YOB: 1945  Patient Number: [id number]      REASON FOR FOLLOW-UP: Cee Quiles is a pleasant 76-year-old  female who is seen on followup for Stage IA right breast cancer, receptor positive, and HER-2/jeremy negative.  Low recurrence score of 6, absolute benefit of chemo is less than 1%.   She is on adjuvant Femara for the past 28.5 months. She is also seen for anemia from iron deficiency and chronic kidney disease.  She is off iron for the past 12 months.  She is seen alone.  She is a reliable historian.        She had a follow-up with Ms. Suha Mirza, BRAVO 06/30/2021.  GFR 44 mL/min.       Oncology/Hematology History Overview Note   DIAGNOSTIC ABNORMALITIES:  The patient was found to have a right breast mass by mammogram at [facility].   Right breast core biopsy 01/17/2019 showed infiltrating lobular carcinoma, grade 1. Greatest dimension of tumor measured 11.1 mm. ER 98%, NM 92%, HER-2/jeremy +1 and negative FISH, and Ki-67 at 7%,. Oncotype DX report. Low recurrence score of 6, absolute benefit of chemo is less than 1%  Breast MRI [facility] 01/30/2019, 1.8 x 0.7 x 0.6 cm, non mass-like linear enhancement at 1 o'clock. Second suspicious area of enhancement in the right breast just behind the nipple measuring 1 x 1 x 0.9 cm. Two areas of linear non mass-like enhancement has anterior depth at 1 o'clock in the left breast.   The patient was seen by Dr. Hodges 02/01/2019. Planned for bilateral mastectomy and sentinel lymph node evaluation.   Ultrasound biopsy left breast showed atypical lobular hyperplasia. No histologic evidence of malignancy.   The patient had a followup with Dr. Hodges 02/13/2019.  CBC 02/13/2019 revealed a WBC of 10.6, hemoglobin 12.2,  hematocrit 36.8, MCV 86.4, platelet count 211,000 with ANC of 9.4. CMP remarkable for a GFR of 46 mL/minute.  Pathology report 02/15/2019 left breast showed no evidence of in situ or invasive carcinoma. Right breast showed an infiltrating lobular carcinoma, grade 1 with residual 8 mm.       PREVIOUS INTERVENTIONS:   Bilateral mastectomy with right breast sentinel lymph node biopsy on 02/12/2019.  Adjuvant Femara 2.5 mg p.o. daily 03/22/2019 through present.      PREVIOUS INTERVENTIONS:  Ferrous sulfate 325 mg daily 04/22/2020 through 05/20/2020.  Resume 07/17/2020 through 08/20/2020.     Breast CA (CMS/HCC)   2/12/2019 Initial Diagnosis    Breast CA (CMS/HCC)     Malignant neoplasm of upper-inner quadrant of right breast in female, estrogen receptor positive (CMS/HCC)   4/23/2020 Initial Diagnosis    Malignant neoplasm of upper-inner quadrant of right breast in female, estrogen receptor positive (CMS/HCC)     4/24/2020 -  Chemotherapy    OP SUPPORTIVE Denosumab (Prolia) Q6M         PAST MEDICAL HISTORY:  ALLERGIES:  Allergies   Allergen Reactions   • Keflex [Cephalexin] Anaphylaxis   • Lortab [Hydrocodone-Acetaminophen] Nausea Only     CURRENT MEDICATIONS:  Outpatient Encounter Medications as of 8/17/2021   Medication Sig Dispense Refill   • atorvastatin (LIPITOR) 10 MG tablet TAKE 1 TABLET BY MOUTH  DAILY 90 tablet 3   • Bystolic 20 MG tablet TAKE 1 TABLET BY MOUTH  DAILY 90 tablet 3   • calcium citrate-vitamin d (CALCITRATE) 315-250 MG-UNIT tablet tablet Take  by mouth Daily.     • Denosumab (PROLIA SC) Inject  under the skin into the appropriate area as directed.     • furosemide (LASIX) 20 MG tablet Take 20 mg by mouth 2 (Two) Times a Day.     • glipizide (GLUCOTROL XL) 2.5 MG 24 hr tablet TAKE 1 TABLET BY MOUTH  DAILY 90 tablet 3   • letrozole (FEMARA) 2.5 MG tablet TAKE 1 TABLET BY MOUTH  DAILY 90 tablet 3   • levothyroxine (SYNTHROID, LEVOTHROID) 100 MCG tablet TAKE 1 TABLET BY MOUTH  DAILY 90 tablet 3   •  meclizine (ANTIVERT) 25 MG tablet Take 25 mg by mouth 3 (Three) Times a Day As Needed for dizziness.     • amLODIPine (NORVASC) 5 MG tablet      • valsartan (DIOVAN) 320 MG tablet TAKE 1 TABLET BY MOUTH  DAILY 90 tablet 3     No facility-administered encounter medications on file as of 8/17/2021.     ADULT ILLNESSES:  Patient Active Problem List   Diagnosis Code   • Type 2 diabetes mellitus with diabetic chronic kidney disease (CMS/[facility]) E11.22   • Essential hypertension I10   • Acquired hypothyroidism E03.9   • Chronic pain of right knee M25.561, G89.29   • CKD (chronic kidney disease) stage 3, GFR 30-59 ml/min (CMS/[facility]) N18.30   • Mixed hyperlipidemia E78.2   • Morbidly obese (CMS/[facility]) E66.01   • Breast CA (CMS/[facility]) C50.919   • Postmenopausal Z78.0   • Hypomagnesemia E83.42   • Osteopenia of neck of femur M85.859   • Malignant neoplasm of upper-inner quadrant of right breast in female, estrogen receptor positive (CMS/[facility]) C50.211, Z17.0   • Long term (current) use of aromatase inhibitors Z79.811     SURGERIES:  Past Surgical History:   Procedure Laterality Date   • CHOLECYSTECTOMY     • COLONOSCOPY W/ POLYPECTOMY  02/08/2012    Pedunculated polyp cecal pit, Hyperplastic polyp at 15 cm repeat exam in 5 years   • KNEE SURGERY Right    • MASTECTOMY W/ SENTINEL NODE BIOPSY Bilateral 2/12/2019    Procedure: BILATERAL MASTECTOMY WITH RIGHT BREAST SENTINEL LYMPH NODE BIOPSY - RADIOLOGIST WILL INJECT;  Surgeon: Michael Hodges MD;  Location: [facility] OR;  Service: General   • OVARIAN CYST DRAINAGE Left    • TUMOR REMOVAL Left      HEALTH MAINTENANCE ITEMS:  Health Maintenance Due   Topic Date Due   • COVID-19 Vaccine (1) Never done   • TDAP/TD VACCINES (1 - Tdap) Never done   • ZOSTER VACCINE (1 of 2) Never done   • HEPATITIS C SCREENING  Never done   • Pneumococcal Vaccine 65+ (1 of 2 - PPSV23) 01/13/2020   • DIABETIC EYE EXAM  10/16/2020       <no information>  Last Completed Colonoscopy     This patient has no relevant  "Health Maintenance data.        Immunization History   Administered Date(s) Administered   • FLUAD TRI 65YR+ 11/18/2019   • Flu Vaccine Quad PF >18YRS 11/13/2017   • Flu Vaccine Quad PF >36MO 11/13/2017   • Fluad Quad 65+ 11/18/2019   • Fluzone High Dose =>65 Years ([employer] ONLY) 10/23/2015, 10/31/2016, 10/31/2018   • Influenza Quad Vaccine (Inpatient) 11/13/2017   • Pneumococcal Conjugate 13-Valent (PCV13) 11/18/2019     Last Completed Mammogram          Discontinued - MAMMOGRAM  Discontinued    02/07/2019  Outside Claim: HC MAMMOGRAM DIAGNOSTIC UNILAT DIGITAL W CAD,HC US BREAST UNILATERAL LIMITED    01/30/2019  MRI Breast Bilateral With & Without Contrast    01/16/2019  Outside Claim: HC MAMMOGRAM DIAGNOSTIC UNILAT DIGITAL W CAD,HC US BREAST UNILATERAL LIMITED    12/21/2018  Mammo diagnostic digital tomosynthesis right w CAD    12/17/2018  Mammo Screening Bilateral With CAD    Only the first 5 history entries have been loaded, but more history exists.                  FAMILY HISTORY:  Family History   Problem Relation Age of Onset   • Colon cancer Neg Hx    • Colon polyps Neg Hx      SOCIAL HISTORY:  Social History     Socioeconomic History   • Marital status:      Spouse name: Not on file   • Number of children: Not on file   • Years of education: Not on file   • Highest education level: Not on file   Tobacco Use   • Smoking status: Never Smoker   • Smokeless tobacco: Never Used   Substance and Sexual Activity   • Alcohol use: No   • Drug use: No   • Sexual activity: Defer       REVIEW OF SYSTEMS:    Review of Systems   Constitutional: Negative for chills, fatigue and fever.        \"I feel fine.\"  Tolerating Femara.    HENT: Negative for congestion, mouth sores and trouble swallowing.    Eyes: Negative for discharge and redness.   Respiratory: Negative for cough, shortness of breath and wheezing.    Cardiovascular: Negative for chest pain and palpitations.   Gastrointestinal: Negative for abdominal pain, " "constipation, diarrhea, nausea and vomiting.   Endocrine: Negative for cold intolerance and heat intolerance.   Genitourinary: Negative for difficulty urinating, dysuria and flank pain.   Musculoskeletal: Negative for gait problem and joint swelling.   Skin: Negative for pallor.   Allergic/Immunologic: Negative for food allergies.   Neurological: Negative for dizziness, speech difficulty and weakness.   Hematological: Negative for adenopathy. Does not bruise/bleed easily.   Psychiatric/Behavioral: Negative for agitation, confusion and hallucinations.       VITAL SIGNS: /66   Pulse 73   Temp 97.4 °F (36.3 °C)   Resp 18   Ht 154.9 cm (61\")   Wt 94.8 kg (209 lb)   SpO2 97%   Breastfeeding No   BMI 39.49 kg/m²   Pain Score    08/17/21 1037   PainSc: 0-No pain       PHYSICAL EXAMINATION:     Physical Exam  Vitals reviewed.   Constitutional:       General: She is not in acute distress.  HENT:      Head: Normocephalic and atraumatic.   Eyes:      General: No scleral icterus.  Cardiovascular:      Rate and Rhythm: Normal rate and regular rhythm.   Pulmonary:      Effort: No respiratory distress.      Breath sounds: No wheezing or rales.      Comments: Bilateral mastectomy scars. Examined with Gabby.   Abdominal:      General: Bowel sounds are normal.      Palpations: Abdomen is soft.      Tenderness: There is no abdominal tenderness.   Musculoskeletal:         General: No swelling.      Cervical back: Neck supple.   Skin:     General: Skin is warm and dry.      Coloration: Skin is not pale.   Neurological:      Mental Status: She is alert and oriented to person, place, and time.   Psychiatric:         Mood and Affect: Mood normal.         Behavior: Behavior normal.         Thought Content: Thought content normal.         Judgment: Judgment normal.         LABS    Lab Results - Last 18 Months   Lab Units 08/10/21  1013 04/08/21  1115 12/10/20  1233 09/16/20  0826 08/12/20  0836 07/15/20  0835 06/17/20  0834 " 06/17/20  0834   HEMOGLOBIN g/dL 13.3 12.1 12.1 12.2 12.1 11.6*   < > 12.1   HEMATOCRIT % 38.6 34.3 36.0 35.2 35.7 34.0   < > 35.1   MCV fL 81.6 81.9 84.7 85.9 84.8 84.6   < > 83.6   WBC 10*3/mm3 8.24 6.67 6.85 6.71 7.71 7.09   < > 7.05   RDW % 13.1 13.2 13.6 13.0 12.9 13.2   < > 13.3   MPV fL 9.1 9.2 9.2  --  9.0 9.0  --  9.2   PLATELETS 10*3/mm3 269 240 272 283 257 265   < > 268   IMM GRAN % % 0.7* 0.4 0.4  --  0.8* 0.6*  --  0.3   NEUTROS ABS 10*3/mm3 5.70 4.32 4.37 4.38 4.89 4.45   < > 4.72   LYMPHS ABS 10*3/mm3 1.74 1.51 1.64 1.57 1.75 1.62   < > 1.46   MONOS ABS 10*3/mm3 0.52 0.43 0.48 0.43 0.57 0.56   < > 0.54   EOS ABS 10*3/mm3 0.19 0.34 0.28 0.26 0.40 0.38   < > 0.28   BASOS ABS 10*3/mm3 0.03 0.04 0.05 0.04 0.04 0.04   < > 0.03   IMMATURE GRANS (ABS) 10*3/mm3 0.06* 0.03 0.03  --  0.06* 0.04  --  0.02   NRBC /100 WBC 0.0 0.0 0.0 0.0 0.0 0.0   < > 0.0    < > = values in this interval not displayed.       Lab Results - Last 18 Months   Lab Units 08/13/21  0809 08/10/21  1013 04/08/21  1115 12/10/20  1233 10/26/20  1015 09/16/20  0826 08/12/20  0836 07/15/20  0835 07/15/20  0835   GLUCOSE mg/dL  --  122* 107* 119* 116*  --  109*  --  118*   SODIUM mmol/L 138 139 138 138 136 133* 134*   < > 138   POTASSIUM mmol/L 3.4* 3.3* 3.7 4.0 3.8 4.8 4.1   < > 3.8   TOTAL CO2 mmol/L 26.3  --   --   --   --  26.3  --   --   --    CO2 mmol/L  --  26.0 27.0 27.0 27.0  --  26.0   < > 24.0   CHLORIDE mmol/L 101 100 100 102 100 98 94*   < > 97*   ANION GAP mmol/L  --  13.0 11.0 9.0 9.0  --  14.0  --  17.0*   CREATININE mg/dL 1.14* 1.02* 1.35* 1.50* 1.58* 1.39* 1.64*   < > 1.40*   BUN mg/dL 23 18 27* 28* 23 25* 25*   < > 28*   BUN / CREAT RATIO  20.2 17.6 20.0 18.7 14.6 18.0 15.2   < > 20.0   CALCIUM mg/dL 10.3 9.8 9.6 9.2 10.2 9.8 9.6   < > 10.3   EGFR IF NONAFRICN AM mL/min/1.73 47* 53* 38* 34* 32* 37* 31*   < > 37*   ALK PHOS U/L  --  75 66 69 71 71 65   < > 59   TOTAL PROTEIN g/dL  --  7.5 7.2 7.1 7.1  --  7.0  --  6.9   ALT  "(SGPT) U/L  --  12 9 10 11 9 10   < > 15   AST (SGOT) U/L  --  16 17 14 15 13 13   < > 15   BILIRUBIN mg/dL  --  0.8 0.6 0.4 0.6 0.5 0.4   < > 0.3   ALBUMIN g/dL  --  4.50 3.90 4.10 4.10 4.40 4.20   < > 4.10   GLOBULIN gm/dL  --  3.0 3.3 3.0 3.0  --  2.8  --  2.8    < > = values in this interval not displayed.       Lab Results - Last 18 Months   Lab Units 08/10/21  1013 04/08/21  1115 12/10/20  1233 08/12/20  0836 04/15/20  0837 03/10/20  0808   CEA ng/mL 1.39 1.55 1.34 1.51 1.50 1.48       Lab Results - Last 18 Months   Lab Units 02/25/21  0839 09/16/20  0826 07/15/20  0835 06/17/20  0834 05/20/20  0834 04/22/20  0841   IRON mcg/dL  --   --  49 61 104 52   TIBC mcg/dL  --   --  331 352 386 352   IRON SATURATION %  --   --  15* 17* 27 15*   FERRITIN ng/mL  --   --  113.00 152.10* 150.70* 153.30*   TSH uIU/mL 1.880 3.410  --   --   --   --    FOLATE ng/mL  --   --   --   --   --  7.92       Cee Quiles reports a pain score of 0.        ASSESSMENT:  1.   Carcinoma of the right breast, infiltrating lobular.  12 o'clock. Low recurrence score of 6, absolute benefit of chemo is less than 1%:  Current Stage: AJCC Stage IA (T1c N0, M0, G1)   Tumor size 1.91 cm.  Hormone Status: ER 98%, WA 92%, HER-2/jeremy negative by FISH.  Baseline Node Status: 0/2 positive.  Treatment status: Post bilateral mastectomy and right sentinel node biopsy.  On adjuvant Femara 2.5 mg 03/22/2019 through present.  Plan for 10 years.   2.   Normocytic anemia from chronic kidney disease Stage IV, GFR 22 mL/minute.   3.   Chronic kidney disease Stage IV, GFR 22 mL/minute 04/15/2020.  Erythropoietin 16.9 on 04/24/2020.  4.   Osteopenia. On denosumab.  5.   Performance status of 0.                PLAN:  1.    Re:  Tolerating adjuvant Femara.    2.    Re:  Heme status.  Hemoglobin 13.3.  3.    Re:  Pre-office CMP.  GFR 53 mL/minute and potassium 3.3. Repeat K 3.4. \"He called me and I know what to do.  Eat food with high in " "potassium.\"  4.    Re:  Pre-office CEA at 1.39.  5.    Re:  Pre-office CA27-29 at 31.8.   6.   Retacrit 40,000 units subcutaneous weekly if hemoglobin below 10 and hematocrit below 30 to target a hemoglobin of of 11 and hematocrit of 33.  Move CBC with differential weekly if she starts Retacrit.  Observe for hypertension.  7.   Schedule Prolia 60 mg subcutaneously every 6 months, bone density improved and still on Femara.  Monitor for osteonecrosis of the jaw.  8.   Next bone density 03/2023 to follow osteopenia.  9. Continue ongoing management per primary care physician and other specialists.  10. Plan of care discussed with patient.  Understanding expressed.  Patient agreeable to proceed.  11. Advance Care Planning  ACP discussion was declined by the patient. Patient does not have an advance directive, information provided.  12. eRx for Femara 2.5 mg p.o. daily, 90, 3 refills if needed.  Stop and call if intolerant. Observe for bone loss, thrombosis, and hot flashes.  Plan for 10 years.  High benefit for extended therapy.   13. Return to office in 4 months with pre-office CBC with differential, CEA, CA27-29, and CMP.         I have reviewed the assessment and plan and verified the accuracy of it. No changes to assessment and plan since the information was documented. Andrea Cartwright MD 08/17/21          I spent 30 total minutes, face-to-face, caring for Cee today.  Greater than 50% of this time involved counseling and/or coordination of care as documented within this note regarding the patient's illness(es), pros and cons of various treatment options, instructions and/or risk reduction.         MD Suha Jones APRN Thomas Staton, MD  "

## 2021-08-10 ENCOUNTER — TELEPHONE (OUTPATIENT)
Dept: ONCOLOGY | Facility: CLINIC | Age: 76
End: 2021-08-10

## 2021-08-10 ENCOUNTER — LAB (OUTPATIENT)
Dept: LAB | Facility: HOSPITAL | Age: 76
End: 2021-08-10

## 2021-08-10 DIAGNOSIS — Z17.0 MALIGNANT NEOPLASM OF UPPER-INNER QUADRANT OF RIGHT BREAST IN FEMALE, ESTROGEN RECEPTOR POSITIVE (HCC): ICD-10-CM

## 2021-08-10 DIAGNOSIS — C50.211 MALIGNANT NEOPLASM OF UPPER-INNER QUADRANT OF RIGHT BREAST IN FEMALE, ESTROGEN RECEPTOR POSITIVE (HCC): ICD-10-CM

## 2021-08-10 DIAGNOSIS — M85.851 OSTEOPENIA OF NECK OF RIGHT FEMUR: ICD-10-CM

## 2021-08-10 DIAGNOSIS — Z79.811 LONG TERM (CURRENT) USE OF AROMATASE INHIBITORS: ICD-10-CM

## 2021-08-10 LAB
ALBUMIN SERPL-MCNC: 4.5 G/DL (ref 3.5–5.2)
ALBUMIN/GLOB SERPL: 1.5 G/DL
ALP SERPL-CCNC: 75 U/L (ref 39–117)
ALT SERPL W P-5'-P-CCNC: 12 U/L (ref 1–33)
ANION GAP SERPL CALCULATED.3IONS-SCNC: 13 MMOL/L (ref 5–15)
AST SERPL-CCNC: 16 U/L (ref 1–32)
BASOPHILS # BLD AUTO: 0.03 10*3/MM3 (ref 0–0.2)
BASOPHILS NFR BLD AUTO: 0.4 % (ref 0–1.5)
BILIRUB SERPL-MCNC: 0.8 MG/DL (ref 0–1.2)
BUN SERPL-MCNC: 18 MG/DL (ref 8–23)
BUN/CREAT SERPL: 17.6 (ref 7–25)
CALCIUM SPEC-SCNC: 9.8 MG/DL (ref 8.6–10.5)
CEA SERPL-MCNC: 1.39 NG/ML
CHLORIDE SERPL-SCNC: 100 MMOL/L (ref 98–107)
CO2 SERPL-SCNC: 26 MMOL/L (ref 22–29)
CREAT SERPL-MCNC: 1.02 MG/DL (ref 0.57–1)
DEPRECATED RDW RBC AUTO: 38.6 FL (ref 37–54)
EOSINOPHIL # BLD AUTO: 0.19 10*3/MM3 (ref 0–0.4)
EOSINOPHIL NFR BLD AUTO: 2.3 % (ref 0.3–6.2)
ERYTHROCYTE [DISTWIDTH] IN BLOOD BY AUTOMATED COUNT: 13.1 % (ref 12.3–15.4)
GFR SERPL CREATININE-BSD FRML MDRD: 53 ML/MIN/1.73
GLOBULIN UR ELPH-MCNC: 3 GM/DL
GLUCOSE SERPL-MCNC: 122 MG/DL (ref 65–99)
HCT VFR BLD AUTO: 38.6 % (ref 34–46.6)
HGB BLD-MCNC: 13.3 G/DL (ref 12–15.9)
IMM GRANULOCYTES # BLD AUTO: 0.06 10*3/MM3 (ref 0–0.05)
IMM GRANULOCYTES NFR BLD AUTO: 0.7 % (ref 0–0.5)
LYMPHOCYTES # BLD AUTO: 1.74 10*3/MM3 (ref 0.7–3.1)
LYMPHOCYTES NFR BLD AUTO: 21.1 % (ref 19.6–45.3)
MCH RBC QN AUTO: 28.1 PG (ref 26.6–33)
MCHC RBC AUTO-ENTMCNC: 34.5 G/DL (ref 31.5–35.7)
MCV RBC AUTO: 81.6 FL (ref 79–97)
MONOCYTES # BLD AUTO: 0.52 10*3/MM3 (ref 0.1–0.9)
MONOCYTES NFR BLD AUTO: 6.3 % (ref 5–12)
NEUTROPHILS NFR BLD AUTO: 5.7 10*3/MM3 (ref 1.7–7)
NEUTROPHILS NFR BLD AUTO: 69.2 % (ref 42.7–76)
NRBC BLD AUTO-RTO: 0 /100 WBC (ref 0–0.2)
PLATELET # BLD AUTO: 269 10*3/MM3 (ref 140–450)
PMV BLD AUTO: 9.1 FL (ref 6–12)
POTASSIUM SERPL-SCNC: 3.3 MMOL/L (ref 3.5–5.2)
PROT SERPL-MCNC: 7.5 G/DL (ref 6–8.5)
RBC # BLD AUTO: 4.73 10*6/MM3 (ref 3.77–5.28)
SODIUM SERPL-SCNC: 139 MMOL/L (ref 136–145)
WBC # BLD AUTO: 8.24 10*3/MM3 (ref 3.4–10.8)

## 2021-08-10 PROCEDURE — 36415 COLL VENOUS BLD VENIPUNCTURE: CPT

## 2021-08-10 PROCEDURE — 86300 IMMUNOASSAY TUMOR CA 15-3: CPT

## 2021-08-10 PROCEDURE — 82378 CARCINOEMBRYONIC ANTIGEN: CPT

## 2021-08-10 PROCEDURE — 80053 COMPREHEN METABOLIC PANEL: CPT

## 2021-08-10 PROCEDURE — 85025 COMPLETE CBC W/AUTO DIFF WBC: CPT

## 2021-08-10 NOTE — TELEPHONE ENCOUNTER
----- Message from Andrea Cartwright MD sent at 8/10/2021 11:37 AM CDT -----  Fax CMP to PCP.  Potassium 3.3.

## 2021-08-11 LAB — CANCER AG27-29 SERPL-ACNC: 31.8 U/ML (ref 0–38.6)

## 2021-08-13 ENCOUNTER — LAB (OUTPATIENT)
Dept: FAMILY MEDICINE CLINIC | Facility: CLINIC | Age: 76
End: 2021-08-13

## 2021-08-13 DIAGNOSIS — R89.9 ABNORMAL LABORATORY TEST RESULT: Primary | ICD-10-CM

## 2021-08-14 LAB
BUN SERPL-MCNC: 23 MG/DL (ref 8–23)
BUN/CREAT SERPL: 20.2 (ref 7–25)
CALCIUM SERPL-MCNC: 10.3 MG/DL (ref 8.6–10.5)
CHLORIDE SERPL-SCNC: 101 MMOL/L (ref 98–107)
CO2 SERPL-SCNC: 26.3 MMOL/L (ref 22–29)
CREAT SERPL-MCNC: 1.14 MG/DL (ref 0.57–1)
GLUCOSE SERPL-MCNC: 115 MG/DL (ref 65–99)
POTASSIUM SERPL-SCNC: 3.4 MMOL/L (ref 3.5–5.2)
SODIUM SERPL-SCNC: 138 MMOL/L (ref 136–145)

## 2021-08-17 ENCOUNTER — OFFICE VISIT (OUTPATIENT)
Dept: ONCOLOGY | Facility: CLINIC | Age: 76
End: 2021-08-17

## 2021-08-17 VITALS
RESPIRATION RATE: 18 BRPM | WEIGHT: 209 LBS | HEIGHT: 61 IN | OXYGEN SATURATION: 97 % | HEART RATE: 73 BPM | DIASTOLIC BLOOD PRESSURE: 66 MMHG | SYSTOLIC BLOOD PRESSURE: 132 MMHG | TEMPERATURE: 97.4 F | BODY MASS INDEX: 39.46 KG/M2

## 2021-08-17 DIAGNOSIS — Z17.0 MALIGNANT NEOPLASM OF UPPER-INNER QUADRANT OF RIGHT BREAST IN FEMALE, ESTROGEN RECEPTOR POSITIVE (HCC): Primary | ICD-10-CM

## 2021-08-17 DIAGNOSIS — C50.211 MALIGNANT NEOPLASM OF UPPER-INNER QUADRANT OF RIGHT BREAST IN FEMALE, ESTROGEN RECEPTOR POSITIVE (HCC): Primary | ICD-10-CM

## 2021-08-17 PROCEDURE — 99214 OFFICE O/P EST MOD 30 MIN: CPT | Performed by: INTERNAL MEDICINE

## 2021-08-17 RX ORDER — AMLODIPINE BESYLATE 5 MG/1
TABLET ORAL
COMMUNITY
Start: 2021-06-25

## 2021-08-26 ENCOUNTER — OFFICE VISIT (OUTPATIENT)
Dept: FAMILY MEDICINE CLINIC | Facility: CLINIC | Age: 76
End: 2021-08-26

## 2021-08-26 VITALS
RESPIRATION RATE: 16 BRPM | SYSTOLIC BLOOD PRESSURE: 132 MMHG | WEIGHT: 201 LBS | HEART RATE: 71 BPM | BODY MASS INDEX: 37.95 KG/M2 | OXYGEN SATURATION: 97 % | DIASTOLIC BLOOD PRESSURE: 50 MMHG | HEIGHT: 61 IN

## 2021-08-26 DIAGNOSIS — E78.2 MIXED HYPERLIPIDEMIA: ICD-10-CM

## 2021-08-26 DIAGNOSIS — Z17.0 MALIGNANT NEOPLASM OF UPPER-OUTER QUADRANT OF RIGHT BREAST IN FEMALE, ESTROGEN RECEPTOR POSITIVE (HCC): ICD-10-CM

## 2021-08-26 DIAGNOSIS — C50.411 MALIGNANT NEOPLASM OF UPPER-OUTER QUADRANT OF RIGHT BREAST IN FEMALE, ESTROGEN RECEPTOR POSITIVE (HCC): ICD-10-CM

## 2021-08-26 DIAGNOSIS — E11.22 TYPE 2 DIABETES MELLITUS WITH STAGE 3 CHRONIC KIDNEY DISEASE, WITHOUT LONG-TERM CURRENT USE OF INSULIN, UNSPECIFIED WHETHER STAGE 3A OR 3B CKD (HCC): ICD-10-CM

## 2021-08-26 DIAGNOSIS — I10 ESSENTIAL HYPERTENSION: Primary | ICD-10-CM

## 2021-08-26 DIAGNOSIS — N18.30 TYPE 2 DIABETES MELLITUS WITH STAGE 3 CHRONIC KIDNEY DISEASE, WITHOUT LONG-TERM CURRENT USE OF INSULIN, UNSPECIFIED WHETHER STAGE 3A OR 3B CKD (HCC): ICD-10-CM

## 2021-08-26 PROCEDURE — 99214 OFFICE O/P EST MOD 30 MIN: CPT | Performed by: FAMILY MEDICINE

## 2021-08-26 NOTE — PROGRESS NOTES
Subjective   Cee Quiles is a 76 y.o. female.     Chief Complaint   Patient presents with   • Hypertension     6 mo        History of Present Illness     she thinks her bp is stable without cp or ha--she thinks bs are stable ---toleriang synthroid without heat or cold itnaomces---she is taking prolia for osteoporosis and has bone density test coming up  She sees oncology for her breast cancer hx    Current Outpatient Medications:   •  amLODIPine (NORVASC) 5 MG tablet, , Disp: , Rfl:   •  atorvastatin (LIPITOR) 10 MG tablet, TAKE 1 TABLET BY MOUTH  DAILY, Disp: 90 tablet, Rfl: 3  •  Bystolic 20 MG tablet, TAKE 1 TABLET BY MOUTH  DAILY, Disp: 90 tablet, Rfl: 3  •  calcium citrate-vitamin d (CALCITRATE) 315-250 MG-UNIT tablet tablet, Take  by mouth Daily., Disp: , Rfl:   •  Denosumab (PROLIA SC), Inject  under the skin into the appropriate area as directed., Disp: , Rfl:   •  furosemide (LASIX) 20 MG tablet, Take 20 mg by mouth 2 (Two) Times a Day., Disp: , Rfl:   •  glipizide (GLUCOTROL XL) 2.5 MG 24 hr tablet, TAKE 1 TABLET BY MOUTH  DAILY, Disp: 90 tablet, Rfl: 3  •  letrozole (FEMARA) 2.5 MG tablet, TAKE 1 TABLET BY MOUTH  DAILY, Disp: 90 tablet, Rfl: 3  •  levothyroxine (SYNTHROID, LEVOTHROID) 100 MCG tablet, TAKE 1 TABLET BY MOUTH  DAILY, Disp: 90 tablet, Rfl: 3  •  meclizine (ANTIVERT) 25 MG tablet, Take 25 mg by mouth 3 (Three) Times a Day As Needed for dizziness., Disp: , Rfl:   Allergies   Allergen Reactions   • Keflex [Cephalexin] Anaphylaxis   • Lortab [Hydrocodone-Acetaminophen] Nausea Only       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, left     WRIST   • Cancer (CMS/HCC)     LOBULAR RIGHT BREAST   • Chronic knee pain     BILATERAL   • Colon polyp    • Cyst of ovary, left    • Diabetes (CMS/HCC)    • Diabetes mellitus (CMS/HCC)    • Dizziness    • Elevated cholesterol    • Gallstones    • Hx of colonic polyps    • Hyperlipidemia    •  "Hypertension    • Hypertension    • Hypothyroid    • Kidney disease    • Long term (current) use of aromatase inhibitors 4/23/2020   • Malignant neoplasm of upper-inner quadrant of right breast in female, estrogen receptor positive (CMS/HCC) 4/23/2020   • Osteopenia of neck of femur 4/23/2020   • PONV (postoperative nausea and vomiting)    • Torn meniscus      Past Surgical History:   Procedure Laterality Date   • CHOLECYSTECTOMY     • COLONOSCOPY W/ POLYPECTOMY  02/08/2012    Pedunculated polyp cecal pit, Hyperplastic polyp at 15 cm repeat exam in 5 years   • KNEE SURGERY Right    • MASTECTOMY W/ SENTINEL NODE BIOPSY Bilateral 2/12/2019    Procedure: BILATERAL MASTECTOMY WITH RIGHT BREAST SENTINEL LYMPH NODE BIOPSY - RADIOLOGIST WILL INJECT;  Surgeon: Michael Hodges MD;  Location: [facility] OR;  Service: General   • OVARIAN CYST DRAINAGE Left    • TUMOR REMOVAL Left        Review of Systems   Constitutional: Negative.    HENT: Negative.    Eyes: Negative.    Respiratory: Negative.    Cardiovascular: Negative.    Gastrointestinal: Negative.    Endocrine: Negative.    Genitourinary: Negative.    Musculoskeletal: Negative.    Skin: Negative.    Allergic/Immunologic: Negative.    Neurological: Negative.    Hematological: Negative.    Psychiatric/Behavioral: Negative.        Objective  /50   Pulse 71   Resp 16   Ht 154.9 cm (61\")   Wt 91.2 kg (201 lb)   SpO2 97%   BMI 37.98 kg/m²   Physical Exam  Vitals and nursing note reviewed.   Constitutional:       Appearance: Normal appearance. She is normal weight.   HENT:      Head: Normocephalic and atraumatic.      Nose: Nose normal.      Mouth/Throat:      Mouth: Mucous membranes are moist.   Eyes:      Pupils: Pupils are equal, round, and reactive to light.   Cardiovascular:      Rate and Rhythm: Normal rate.      Pulses: Normal pulses.      Heart sounds: Normal heart sounds.   Pulmonary:      Effort: Pulmonary effort is normal.      Breath sounds: Normal breath " sounds.   Abdominal:      General: Abdomen is flat. Bowel sounds are normal.      Palpations: Abdomen is soft.   Musculoskeletal:         General: Normal range of motion.      Cervical back: Neck supple.   Skin:     General: Skin is warm and dry.      Capillary Refill: Capillary refill takes less than 2 seconds.   Neurological:      General: No focal deficit present.      Mental Status: She is alert and oriented to person, place, and time. Mental status is at baseline.   Psychiatric:         Mood and Affect: Mood normal.         Behavior: Behavior normal.         Thought Content: Thought content normal.         Judgment: Judgment normal.         Assessment/Plan   Diagnoses and all orders for this visit:    1. Essential hypertension (Primary)  -     Hemoglobin A1c  -     TSH  -     Comprehensive metabolic panel  -     Lipid Panel With / Chol / HDL Ratio    2. Mixed hyperlipidemia  -     Hemoglobin A1c  -     TSH  -     Comprehensive metabolic panel  -     Lipid Panel With / Chol / HDL Ratio    3. Type 2 diabetes mellitus with stage 3 chronic kidney disease, without long-term current use of insulin, unspecified whether stage 3a or 3b CKD (CMS/HCC)  -     Hemoglobin A1c  -     TSH  -     Comprehensive metabolic panel  -     Lipid Panel With / Chol / HDL Ratio    4. Malignant neoplasm of upper-outer quadrant of right breast in female, estrogen receptor positive (CMS/HCC)    she will mojitor bp and bs and keep me iinformedl\she will monitor for myalgias  She will keep appt [facility] oncology and also nephrology.           Orders Placed This Encounter   Procedures   • Hemoglobin A1c     Order Specific Question:   Release to patient     Answer:   Immediate   • TSH     Order Specific Question:   Release to patient     Answer:   Immediate   • Comprehensive metabolic panel     Order Specific Question:   Release to patient     Answer:   Immediate   • Lipid Panel With / Chol / HDL Ratio     Order Specific Question:   Release to  patient     Answer:   Immediate       Follow up: 6 month(s)

## 2021-08-27 LAB
ALBUMIN SERPL-MCNC: 4.4 G/DL (ref 3.5–5.2)
ALBUMIN/GLOB SERPL: 1.6 G/DL
ALP SERPL-CCNC: 71 U/L (ref 39–117)
ALT SERPL-CCNC: 10 U/L (ref 1–33)
AST SERPL-CCNC: 18 U/L (ref 1–32)
BILIRUB SERPL-MCNC: 0.8 MG/DL (ref 0–1.2)
BUN SERPL-MCNC: 16 MG/DL (ref 8–23)
BUN/CREAT SERPL: 14.2 (ref 7–25)
CALCIUM SERPL-MCNC: 9.7 MG/DL (ref 8.6–10.5)
CHLORIDE SERPL-SCNC: 99 MMOL/L (ref 98–107)
CHOLEST SERPL-MCNC: 194 MG/DL (ref 0–200)
CHOLEST/HDLC SERPL: 4.73 {RATIO}
CO2 SERPL-SCNC: 27.2 MMOL/L (ref 22–29)
CREAT SERPL-MCNC: 1.13 MG/DL (ref 0.57–1)
GLOBULIN SER CALC-MCNC: 2.8 GM/DL
GLUCOSE SERPL-MCNC: 114 MG/DL (ref 65–99)
HBA1C MFR BLD: 5.1 % (ref 4.8–5.6)
HDLC SERPL-MCNC: 41 MG/DL (ref 40–60)
LDLC SERPL CALC-MCNC: 130 MG/DL (ref 0–100)
POTASSIUM SERPL-SCNC: 3.7 MMOL/L (ref 3.5–5.2)
PROT SERPL-MCNC: 7.2 G/DL (ref 6–8.5)
SODIUM SERPL-SCNC: 139 MMOL/L (ref 136–145)
TRIGL SERPL-MCNC: 126 MG/DL (ref 0–150)
TSH SERPL DL<=0.005 MIU/L-ACNC: 2.58 UIU/ML (ref 0.27–4.2)
VLDLC SERPL CALC-MCNC: 23 MG/DL (ref 5–40)

## 2021-09-15 RX ORDER — LETROZOLE 2.5 MG/1
2.5 TABLET, FILM COATED ORAL DAILY
Qty: 90 TABLET | Refills: 3 | Status: SHIPPED | OUTPATIENT
Start: 2021-09-15 | End: 2022-09-12

## 2021-09-15 RX ORDER — ATORVASTATIN CALCIUM 10 MG/1
10 TABLET, FILM COATED ORAL DAILY
Qty: 90 TABLET | Refills: 3 | Status: SHIPPED | OUTPATIENT
Start: 2021-09-15 | End: 2022-06-20

## 2021-09-15 RX ORDER — NEBIVOLOL HYDROCHLORIDE 20 MG/1
TABLET ORAL
Qty: 90 TABLET | Refills: 3 | Status: SHIPPED | OUTPATIENT
Start: 2021-09-15 | End: 2022-08-22

## 2021-09-15 RX ORDER — GLIPIZIDE 2.5 MG/1
2.5 TABLET, EXTENDED RELEASE ORAL DAILY
Qty: 90 TABLET | Refills: 3 | Status: SHIPPED | OUTPATIENT
Start: 2021-09-15 | End: 2022-08-22

## 2021-09-15 RX ORDER — HYDROCHLOROTHIAZIDE 25 MG/1
TABLET ORAL
Qty: 90 TABLET | Refills: 3 | Status: SHIPPED | OUTPATIENT
Start: 2021-09-15 | End: 2022-08-22

## 2021-09-15 RX ORDER — LEVOTHYROXINE SODIUM 0.1 MG/1
100 TABLET ORAL DAILY
Qty: 90 TABLET | Refills: 3 | Status: SHIPPED | OUTPATIENT
Start: 2021-09-15 | End: 2022-08-22

## 2021-10-27 DIAGNOSIS — C50.211 MALIGNANT NEOPLASM OF UPPER-INNER QUADRANT OF RIGHT BREAST IN FEMALE, ESTROGEN RECEPTOR POSITIVE (HCC): ICD-10-CM

## 2021-10-27 DIAGNOSIS — Z17.0 MALIGNANT NEOPLASM OF UPPER-INNER QUADRANT OF RIGHT BREAST IN FEMALE, ESTROGEN RECEPTOR POSITIVE (HCC): ICD-10-CM

## 2021-10-27 DIAGNOSIS — M85.851 OSTEOPENIA OF NECK OF RIGHT FEMUR: Primary | ICD-10-CM

## 2021-10-27 DIAGNOSIS — Z79.811 LONG TERM (CURRENT) USE OF AROMATASE INHIBITORS: ICD-10-CM

## 2021-10-29 ENCOUNTER — LAB (OUTPATIENT)
Dept: LAB | Facility: HOSPITAL | Age: 76
End: 2021-10-29

## 2021-10-29 ENCOUNTER — INFUSION (OUTPATIENT)
Dept: ONCOLOGY | Facility: HOSPITAL | Age: 76
End: 2021-10-29

## 2021-10-29 VITALS
HEART RATE: 75 BPM | TEMPERATURE: 98 F | WEIGHT: 202.4 LBS | DIASTOLIC BLOOD PRESSURE: 70 MMHG | BODY MASS INDEX: 38.21 KG/M2 | RESPIRATION RATE: 18 BRPM | HEIGHT: 61 IN | SYSTOLIC BLOOD PRESSURE: 148 MMHG | OXYGEN SATURATION: 94 %

## 2021-10-29 DIAGNOSIS — M85.851 OSTEOPENIA OF NECK OF RIGHT FEMUR: ICD-10-CM

## 2021-10-29 DIAGNOSIS — Z17.0 MALIGNANT NEOPLASM OF UPPER-INNER QUADRANT OF RIGHT BREAST IN FEMALE, ESTROGEN RECEPTOR POSITIVE (HCC): Primary | ICD-10-CM

## 2021-10-29 DIAGNOSIS — Z79.811 LONG TERM (CURRENT) USE OF AROMATASE INHIBITORS: ICD-10-CM

## 2021-10-29 DIAGNOSIS — Z17.0 MALIGNANT NEOPLASM OF UPPER-INNER QUADRANT OF RIGHT BREAST IN FEMALE, ESTROGEN RECEPTOR POSITIVE (HCC): ICD-10-CM

## 2021-10-29 DIAGNOSIS — C50.211 MALIGNANT NEOPLASM OF UPPER-INNER QUADRANT OF RIGHT BREAST IN FEMALE, ESTROGEN RECEPTOR POSITIVE (HCC): Primary | ICD-10-CM

## 2021-10-29 DIAGNOSIS — M85.851 OSTEOPENIA OF NECK OF RIGHT FEMUR: Primary | ICD-10-CM

## 2021-10-29 DIAGNOSIS — C50.211 MALIGNANT NEOPLASM OF UPPER-INNER QUADRANT OF RIGHT BREAST IN FEMALE, ESTROGEN RECEPTOR POSITIVE (HCC): ICD-10-CM

## 2021-10-29 LAB
ALBUMIN SERPL-MCNC: 4.1 G/DL (ref 3.5–5.2)
ALBUMIN/GLOB SERPL: 1.4 G/DL
ALP SERPL-CCNC: 86 U/L (ref 39–117)
ALT SERPL W P-5'-P-CCNC: 12 U/L (ref 1–33)
ANION GAP SERPL CALCULATED.3IONS-SCNC: 13 MMOL/L (ref 5–15)
AST SERPL-CCNC: 15 U/L (ref 1–32)
BILIRUB SERPL-MCNC: 0.6 MG/DL (ref 0–1.2)
BUN SERPL-MCNC: 24 MG/DL (ref 8–23)
BUN/CREAT SERPL: 20 (ref 7–25)
CALCIUM SPEC-SCNC: 10.3 MG/DL (ref 8.6–10.5)
CHLORIDE SERPL-SCNC: 98 MMOL/L (ref 98–107)
CO2 SERPL-SCNC: 27 MMOL/L (ref 22–29)
CREAT SERPL-MCNC: 1.2 MG/DL (ref 0.57–1)
GFR SERPL CREATININE-BSD FRML MDRD: 44 ML/MIN/1.73
GLOBULIN UR ELPH-MCNC: 2.9 GM/DL
GLUCOSE SERPL-MCNC: 129 MG/DL (ref 65–99)
POTASSIUM SERPL-SCNC: 3.3 MMOL/L (ref 3.5–5.2)
PROT SERPL-MCNC: 7 G/DL (ref 6–8.5)
SODIUM SERPL-SCNC: 138 MMOL/L (ref 136–145)
WHOLE BLOOD HOLD SPECIMEN: NORMAL

## 2021-10-29 PROCEDURE — 25010000002 DENOSUMAB 60 MG/ML SOLUTION PREFILLED SYRINGE: Performed by: INTERNAL MEDICINE

## 2021-10-29 PROCEDURE — 96372 THER/PROPH/DIAG INJ SC/IM: CPT

## 2021-10-29 PROCEDURE — 80053 COMPREHEN METABOLIC PANEL: CPT

## 2021-10-29 PROCEDURE — 36415 COLL VENOUS BLD VENIPUNCTURE: CPT

## 2021-10-29 RX ADMIN — DENOSUMAB 60 MG: 60 INJECTION SUBCUTANEOUS at 14:38

## 2021-11-01 ENCOUNTER — TELEPHONE (OUTPATIENT)
Dept: ONCOLOGY | Facility: CLINIC | Age: 76
End: 2021-11-01

## 2021-12-01 NOTE — PROGRESS NOTES
[facility]Y  [address]  [facility] [address] [phone]    Patient Name: Cee Quiles  Encounter Date: 12/15/2021  YOB: 1945  Patient Number: [id number]      REASON FOR FOLLOW-UP:  Cee Quiles is a pleasant 76-year-old  female who is seen on followup for Stage IA right breast cancer, receptor positive, and HER-2/jeremy negative.  Low recurrence score of 6, absolute benefit of chemo is less than 1%.   She is on adjuvant Femara for the past 32.5 months. She is also seen for anemia from iron deficiency and chronic kidney disease.  She is off iron for the past 16 months.  She is seen alone.  She is a reliable historian.         Oncology/Hematology History Overview Note   DIAGNOSTIC ABNORMALITIES:  The patient was found to have a right breast mass by mammogram at [facility].   Right breast core biopsy 01/17/2019 showed infiltrating lobular carcinoma, grade 1. Greatest dimension of tumor measured 11.1 mm. ER 98%, NM 92%, HER-2/jeremy +1 and negative FISH, and Ki-67 at 7%,. Oncotype DX report. Low recurrence score of 6, absolute benefit of chemo is less than 1%  Breast MRI [facility] 01/30/2019, 1.8 x 0.7 x 0.6 cm, non mass-like linear enhancement at 1 o'clock. Second suspicious area of enhancement in the right breast just behind the nipple measuring 1 x 1 x 0.9 cm. Two areas of linear non mass-like enhancement has anterior depth at 1 o'clock in the left breast.   The patient was seen by Dr. Hodges 02/01/2019. Planned for bilateral mastectomy and sentinel lymph node evaluation.   Ultrasound biopsy left breast showed atypical lobular hyperplasia. No histologic evidence of malignancy.   The patient had a followup with Dr. Hodges 02/13/2019.  CBC 02/13/2019 revealed a WBC of 10.6, hemoglobin 12.2, hematocrit 36.8, MCV 86.4, platelet count 211,000 with ANC of 9.4. CMP remarkable  for a GFR of 46 mL/minute.  Pathology report 02/15/2019 left breast showed no evidence of in situ or invasive carcinoma. Right breast showed an infiltrating lobular carcinoma, grade 1 with residual 8 mm.       PREVIOUS INTERVENTIONS:   Bilateral mastectomy with right breast sentinel lymph node biopsy on 02/12/2019.  Adjuvant Femara 2.5 mg p.o. daily 03/22/2019 through present.      PREVIOUS INTERVENTIONS:  Ferrous sulfate 325 mg daily 04/22/2020 through 05/20/2020.  Resume 07/17/2020 through 08/20/2020.     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 breast in female, estrogen receptor positive (CMS/HCC)     4/24/2020 -  Chemotherapy    OP SUPPORTIVE Denosumab (Prolia) Q6M         PAST MEDICAL HISTORY:  ALLERGIES:  Allergies   Allergen Reactions   • Keflex [Cephalexin] Anaphylaxis   • Lortab [Hydrocodone-Acetaminophen] Nausea Only     CURRENT MEDICATIONS:  Outpatient Encounter Medications as of 12/15/2021   Medication Sig Dispense Refill   • amLODIPine (NORVASC) 5 MG tablet      • atorvastatin (LIPITOR) 10 MG tablet TAKE 1 TABLET BY MOUTH  DAILY 90 tablet 3   • Bystolic 20 MG tablet TAKE 1 TABLET BY MOUTH  DAILY 90 tablet 3   • calcium citrate-vitamin d (CALCITRATE) 315-250 MG-UNIT tablet tablet Take  by mouth Daily.     • Denosumab (PROLIA SC) Inject  under the skin into the appropriate area as directed.     • furosemide (LASIX) 20 MG tablet Take 20 mg by mouth 2 (Two) Times a Day.     • glipizide (GLUCOTROL XL) 2.5 MG 24 hr tablet TAKE 1 TABLET BY MOUTH  DAILY 90 tablet 3   • hydroCHLOROthiazide (HYDRODIURIL) 25 MG tablet TAKE 1 TABLET BY MOUTH  DAILY 90 tablet 3   • letrozole (FEMARA) 2.5 MG tablet TAKE 1 TABLET BY MOUTH  DAILY 90 tablet 3   • levothyroxine (SYNTHROID, LEVOTHROID) 100 MCG tablet TAKE  1 TABLET BY MOUTH  DAILY 90 tablet 3   • meclizine (ANTIVERT) 25 MG tablet Take 25 mg by mouth 3 (Three) Times a Day As Needed for dizziness.       No facility-administered encounter medications on file as of 12/15/2021.     ADULT ILLNESSES:  Patient Active Problem List   Diagnosis Code   • Type 2 diabetes mellitus with diabetic chronic kidney disease (HCC) E11.22   • Essential hypertension I10   • Acquired hypothyroidism E03.9   • Chronic pain of right knee M25.561, G89.29   • CKD (chronic kidney disease) stage 3, GFR 30-59 ml/min ([facility]) N18.30   • Mixed hyperlipidemia E78.2   • Morbidly obese ([facility]) E66.01   • Breast CA ([facility]) C50.919   • Postmenopausal Z78.0   • Hypomagnesemia E83.42   • Osteopenia of neck of femur M85.859   • Malignant neoplasm of upper-inner quadrant of right breast in female, estrogen receptor positive ([facility]) C50.211, Z17.0   • Long term (current) use of aromatase inhibitors Z79.811     SURGERIES:  Past Surgical History:   Procedure Laterality Date   • CHOLECYSTECTOMY     • COLONOSCOPY W/ POLYPECTOMY  02/08/2012    Pedunculated polyp cecal pit, Hyperplastic polyp at 15 cm repeat exam in 5 years   • KNEE SURGERY Right    • MASTECTOMY W/ SENTINEL NODE BIOPSY Bilateral 2/12/2019    Procedure: BILATERAL MASTECTOMY WITH RIGHT BREAST SENTINEL LYMPH NODE BIOPSY - RADIOLOGIST WILL INJECT;  Surgeon: Michael Hodges MD;  Location: [facility] OR;  Service: General   • OVARIAN CYST DRAINAGE Left    • TUMOR REMOVAL Left      HEALTH MAINTENANCE ITEMS:  Health Maintenance Due   Topic Date Due   • TDAP/TD VACCINES (1 - Tdap) Never done   • ZOSTER VACCINE (1 of 2) Never done   • HEPATITIS C SCREENING  Never done   • Pneumococcal Vaccine 65+ (1 of 2 - PPSV23) 01/13/2020   • DIABETIC EYE EXAM  10/16/2020   • INFLUENZA VACCINE  08/01/2021   • ANNUAL WELLNESS VISIT  09/18/2021   • URINE MICROALBUMIN  09/18/2021   • COVID-19 Vaccine (3 - Booster) 09/22/2021       <no information>  Last Completed Colonoscopy           "COLORECTAL CANCER SCREENING (COLONOSCOPY - Every 5 Years) Next due on 8/14/2022 04/24/2020  Occult Blood X 3, Stool - Stool, Per Rectum    08/14/2017  SCANNED - COLONOSCOPY    02/08/2012  SCANNED - COLONOSCOPY    01/05/2009  SCANNED - COLONOSCOPY              Immunization History   Administered Date(s) Administered   • COVID-19 (MODERNA) 1st, 2nd, 3rd Dose Only 02/23/2021, 03/22/2021   • FLUAD TRI 65YR+ 11/18/2019   • Flu Vaccine Quad PF >18YRS 11/13/2017   • Flu Vaccine Quad PF >36MO 11/13/2017   • Fluad Quad 65+ 11/18/2019   • Fluzone High Dose =>65 Years ([employer] ONLY) 10/23/2015, 10/31/2016, 10/31/2018   • Influenza Quad Vaccine (Inpatient) 11/13/2017   • Pneumococcal Conjugate 13-Valent (PCV13) 11/18/2019     Last Completed Mammogram          Discontinued - MAMMOGRAM  Discontinued    02/07/2019  Outside Claim: HC MAMMOGRAM DIAGNOSTIC UNILAT DIGITAL W CAD,HC US BREAST UNILATERAL LIMITED    01/30/2019  MRI Breast Bilateral With & Without Contrast    01/16/2019  Outside Claim: HC MAMMOGRAM DIAGNOSTIC UNILAT DIGITAL W CAD,HC US BREAST UNILATERAL LIMITED    12/21/2018  Mammo diagnostic digital tomosynthesis right w CAD    12/17/2018  Mammo Screening Bilateral With CAD    Only the first 5 history entries have been loaded, but more history exists.                  FAMILY HISTORY:  Family History   Problem Relation Age of Onset   • Colon cancer Neg Hx    • Colon polyps Neg Hx      SOCIAL HISTORY:  Social History     Socioeconomic History   • Marital status:    Tobacco Use   • Smoking status: Never Smoker   • Smokeless tobacco: Never Used   Substance and Sexual Activity   • Alcohol use: No   • Drug use: No   • Sexual activity: Defer       REVIEW OF SYSTEMS:    Review of Systems   Constitutional: Negative for chills, fatigue and fever.        \"I feel fine.\"    HENT: Negative for congestion, mouth sores and trouble swallowing.    Eyes: Negative for redness and visual disturbance.   Respiratory: Negative for " "cough, shortness of breath and wheezing.    Cardiovascular: Negative for chest pain and leg swelling.   Gastrointestinal: Negative for abdominal pain, constipation, diarrhea, nausea and vomiting.   Endocrine: Negative for cold intolerance and heat intolerance.   Genitourinary: Negative for difficulty urinating, dysuria and flank pain.   Musculoskeletal: Negative for gait problem and neck stiffness.   Skin: Negative for pallor.   Allergic/Immunologic: Negative for food allergies.   Neurological: Negative for dizziness, speech difficulty and weakness.   Hematological: Negative for adenopathy. Does not bruise/bleed easily.   Psychiatric/Behavioral: Negative for agitation, confusion and hallucinations.       VITAL SIGNS: /68   Pulse 74   Temp 97.6 °F (36.4 °C)   Resp 18   Ht 154.9 cm (60.98\")   Wt 90.8 kg (200 lb 3.2 oz)   SpO2 97%   Breastfeeding No   BMI 37.85 kg/m²   Pain Score    12/15/21 1039   PainSc: 0-No pain       PHYSICAL EXAMINATION:     Physical Exam  Vitals reviewed.   Constitutional:       General: She is not in acute distress.  HENT:      Head: Normocephalic and atraumatic.   Eyes:      General: No scleral icterus.  Cardiovascular:      Rate and Rhythm: Normal rate and regular rhythm.   Pulmonary:      Effort: No respiratory distress.      Breath sounds: No wheezing or rales.      Comments: Bilateral mastectomy scars. Examined with Vanna.  Abdominal:      General: Bowel sounds are normal.      Palpations: Abdomen is soft.      Tenderness: There is no abdominal tenderness.   Musculoskeletal:         General: No swelling.      Cervical back: Neck supple.   Skin:     General: Skin is warm and dry.      Coloration: Skin is not pale.   Neurological:      Mental Status: She is alert and oriented to person, place, and time.   Psychiatric:         Mood and Affect: Mood normal.         Behavior: Behavior normal.         Thought Content: Thought content normal.         Judgment: Judgment normal. "         LABS    Lab Results - Last 18 Months   Lab Units 12/08/21  1043 08/10/21  1013 04/08/21  1115 12/10/20  1233 09/16/20  0826 08/12/20  0836 07/15/20  0835 07/15/20  0835   HEMOGLOBIN g/dL 12.9 13.3 12.1 12.1 12.2 12.1   < > 11.6*   HEMATOCRIT % 39.1 38.6 34.3 36.0 35.2 35.7   < > 34.0   MCV fL 82.0 81.6 81.9 84.7 85.9 84.8   < > 84.6   WBC 10*3/mm3 7.12 8.24 6.67 6.85 6.71 7.71   < > 7.09   RDW % 13.9 13.1 13.2 13.6 13.0 12.9   < > 13.2   MPV fL 9.2 9.1 9.2 9.2  --  9.0  --  9.0   PLATELETS 10*3/mm3 262 269 240 272 283 257   < > 265   IMM GRAN % % 0.8* 0.7* 0.4 0.4  --  0.8*  --  0.6*   NEUTROS ABS 10*3/mm3 4.63 5.70 4.32 4.37 4.38 4.89   < > 4.45   LYMPHS ABS 10*3/mm3 1.60 1.74 1.51 1.64 1.57 1.75   < > 1.62   MONOS ABS 10*3/mm3 0.49 0.52 0.43 0.48 0.43 0.57   < > 0.56   EOS ABS 10*3/mm3 0.31 0.19 0.34 0.28 0.26 0.40   < > 0.38   BASOS ABS 10*3/mm3 0.03 0.03 0.04 0.05 0.04 0.04   < > 0.04   IMMATURE GRANS (ABS) 10*3/mm3 0.06* 0.06* 0.03 0.03  --  0.06*  --  0.04   NRBC /100 WBC 0.0 0.0 0.0 0.0 0.0 0.0   < > 0.0    < > = values in this interval not displayed.       Lab Results - Last 18 Months   Lab Units 12/08/21  1043 10/29/21  1346 08/26/21  0830 08/13/21  0809 08/10/21  1013 04/08/21  1115 12/10/20  1233 12/10/20  1233 10/26/20  1015 10/26/20  1015   GLUCOSE mg/dL 132* 129* 114* 115* 122* 107*   < > 119*   < > 116*   SODIUM mmol/L 135* 138 139 138 139 138   < > 138   < > 136   POTASSIUM mmol/L 3.2* 3.3* 3.7 3.4* 3.3* 3.7   < > 4.0   < > 3.8   TOTAL CO2 mmol/L  --   --  27.2 26.3  --   --   --   --   --   --    CO2 mmol/L 25.0 27.0  --   --  26.0 27.0   < > 27.0   < > 27.0   CHLORIDE mmol/L 98 98 99 101 100 100   < > 102   < > 100   ANION GAP mmol/L 12.0 13.0  --   --  13.0 11.0  --  9.0  --  9.0   CREATININE mg/dL 1.19* 1.20* 1.13* 1.14* 1.02* 1.35*   < > 1.50*   < > 1.58*   BUN mg/dL 18 24* 16 23 18 27*   < > 28*   < > 23   BUN / CREAT RATIO  15.1 20.0 14.2 20.2 17.6 20.0   < > 18.7   < > 14.6   CALCIUM  mg/dL 9.4 10.3 9.7 10.3 9.8 9.6   < > 9.2   < > 10.2   EGFR IF NONAFRICN AM mL/min/1.73 44* 44* 47* 47* 53* 38*   < > 34*   < > 32*   ALK PHOS U/L 85 86 71  --  75 66  --  69   < > 71   TOTAL PROTEIN g/dL 7.7 7.0  --   --  7.5 7.2  --  7.1  --  7.1   ALT (SGPT) U/L 8 12 10  --  12 9  --  10   < > 11   AST (SGOT) U/L 15 15 18  --  16 17  --  14   < > 15   BILIRUBIN mg/dL 0.7 0.6 0.8  --  0.8 0.6  --  0.4   < > 0.6   ALBUMIN g/dL 4.00 4.10 4.40  --  4.50 3.90  --  4.10   < > 4.10   GLOBULIN gm/dL 3.7 2.9  --   --  3.0 3.3  --  3.0  --  3.0    < > = values in this interval not displayed.       Lab Results - Last 18 Months   Lab Units 12/08/21  1043 08/10/21  1013 04/08/21  1115 12/10/20  1233 08/12/20  0836   CEA ng/mL 1.50 1.39 1.55 1.34 1.51       Lab Results - Last 18 Months   Lab Units 08/26/21  0830 02/25/21  0839 09/16/20  0826 07/15/20  0835 06/17/20  0834   IRON mcg/dL  --   --   --  49 61   TIBC mcg/dL  --   --   --  331 352   IRON SATURATION %  --   --   --  15* 17*   FERRITIN ng/mL  --   --   --  113.00 152.10*   TSH uIU/mL 2.580 1.880 3.410  --   --        Cee Quiles reports a pain score of 0.        ASSESSMENT:  1.   Carcinoma of the right breast, infiltrating lobular.  12 o'clock. Low recurrence score of 6, absolute benefit of chemo is less than 1%:  Current Stage: AJCC Stage IA (T1c N0, M0, G1)   Tumor size 1.91 cm.  Hormone Status: ER 98%, MD 92%, HER-2/jeremy negative by FISH.  Baseline Node Status: 0/2 positive.  Treatment status: Post bilateral mastectomy and right sentinel node biopsy.  On adjuvant Femara 2.5 mg 03/22/2019 through present.  Plan for 10 years.   2.   Normocytic anemia from chronic kidney disease Stage IV, GFR 22 mL/minute.   3.   Chronic kidney disease Stage IV, GFR 22 mL/minute 04/15/2020.  Erythropoietin 16.9 on 04/24/2020.  4.   Osteopenia, on letrozole. On denosumab.  5.   Performance status of 0.              PLAN:  1.    Re:  Tolerating adjuvant letrozole.  2.     Re:  Heme status.  Hemoglobin 12.9.  3.    Re:  Pre-office CMP.  GFR 44 mL/minute and K 3.2. Faxed to PCP, on diuretic.  4.    Re:  Pre-office CEA at 1.5.  5.    Re:  Pre-office CA27-29 at 24.4.   6.   Retacrit 40,000 units subcutaneous weekly if hemoglobin below 10 and hematocrit below 30 to target a hemoglobin of of 11 and hematocrit of 33.  Move CBC with differential weekly if she starts Retacrit.  Observe for hypertension.  7.   Schedule Prolia 60 mg subcutaneously every 6 months, bone density improved and still on Femara.  Observe for osteonecrosis of the jaw.  8.   Next bone density 03/2023 to follow osteopenia.  9. Continue ongoing management per primary care physician and other specialists.  10. Plan of care discussed with patient.  Understanding expressed.  Patient agreeable to proceed.  11. Advance Care Planning  ACP discussion was declined by the patient. Patient does not have an advance directive, information provided.  12. eRx for Femara 2.5 mg p.o. daily, 90, 3 refills if needed.  Stop and call if intolerant. Monitor for bone loss and hot flashes.  Plan for 10 years.  High benefit for extended therapy.   13. Return to office in 4 months with pre-office CBC with differential, CEA, CA27-29, and CMP.         I have reviewed the assessment and plan and verified the accuracy of it. No changes to assessment and plan since the information was documented. Andrea Cartwright MD 12/15/21       I spent 31 total minutes, face-to-face, caring for Cee today.  Greater than 50% of this time involved counseling and/or coordination of care as documented within this note regarding the patient's illness(es), pros and cons of various treatment options, instructions and/or risk reduction.           MD Suha Jones APRN Thomas Staton, MD

## 2021-12-08 ENCOUNTER — LAB (OUTPATIENT)
Dept: LAB | Facility: HOSPITAL | Age: 76
End: 2021-12-08

## 2021-12-08 ENCOUNTER — TELEPHONE (OUTPATIENT)
Dept: ONCOLOGY | Facility: CLINIC | Age: 76
End: 2021-12-08

## 2021-12-08 DIAGNOSIS — C50.211 MALIGNANT NEOPLASM OF UPPER-INNER QUADRANT OF RIGHT BREAST IN FEMALE, ESTROGEN RECEPTOR POSITIVE (HCC): ICD-10-CM

## 2021-12-08 DIAGNOSIS — Z17.0 MALIGNANT NEOPLASM OF UPPER-INNER QUADRANT OF RIGHT BREAST IN FEMALE, ESTROGEN RECEPTOR POSITIVE (HCC): ICD-10-CM

## 2021-12-08 LAB
ALBUMIN SERPL-MCNC: 4 G/DL (ref 3.5–5.2)
ALBUMIN/GLOB SERPL: 1.1 G/DL
ALP SERPL-CCNC: 85 U/L (ref 39–117)
ALT SERPL W P-5'-P-CCNC: 8 U/L (ref 1–33)
ANION GAP SERPL CALCULATED.3IONS-SCNC: 12 MMOL/L (ref 5–15)
AST SERPL-CCNC: 15 U/L (ref 1–32)
BASOPHILS # BLD AUTO: 0.03 10*3/MM3 (ref 0–0.2)
BASOPHILS NFR BLD AUTO: 0.4 % (ref 0–1.5)
BILIRUB SERPL-MCNC: 0.7 MG/DL (ref 0–1.2)
BUN SERPL-MCNC: 18 MG/DL (ref 8–23)
BUN/CREAT SERPL: 15.1 (ref 7–25)
CALCIUM SPEC-SCNC: 9.4 MG/DL (ref 8.6–10.5)
CEA SERPL-MCNC: 1.5 NG/ML
CHLORIDE SERPL-SCNC: 98 MMOL/L (ref 98–107)
CO2 SERPL-SCNC: 25 MMOL/L (ref 22–29)
CREAT SERPL-MCNC: 1.19 MG/DL (ref 0.57–1)
DEPRECATED RDW RBC AUTO: 41 FL (ref 37–54)
EOSINOPHIL # BLD AUTO: 0.31 10*3/MM3 (ref 0–0.4)
EOSINOPHIL NFR BLD AUTO: 4.4 % (ref 0.3–6.2)
ERYTHROCYTE [DISTWIDTH] IN BLOOD BY AUTOMATED COUNT: 13.9 % (ref 12.3–15.4)
GFR SERPL CREATININE-BSD FRML MDRD: 44 ML/MIN/1.73
GLOBULIN UR ELPH-MCNC: 3.7 GM/DL
GLUCOSE SERPL-MCNC: 132 MG/DL (ref 65–99)
HCT VFR BLD AUTO: 39.1 % (ref 34–46.6)
HGB BLD-MCNC: 12.9 G/DL (ref 12–15.9)
IMM GRANULOCYTES # BLD AUTO: 0.06 10*3/MM3 (ref 0–0.05)
IMM GRANULOCYTES NFR BLD AUTO: 0.8 % (ref 0–0.5)
LYMPHOCYTES # BLD AUTO: 1.6 10*3/MM3 (ref 0.7–3.1)
LYMPHOCYTES NFR BLD AUTO: 22.5 % (ref 19.6–45.3)
MCH RBC QN AUTO: 27 PG (ref 26.6–33)
MCHC RBC AUTO-ENTMCNC: 33 G/DL (ref 31.5–35.7)
MCV RBC AUTO: 82 FL (ref 79–97)
MONOCYTES # BLD AUTO: 0.49 10*3/MM3 (ref 0.1–0.9)
MONOCYTES NFR BLD AUTO: 6.9 % (ref 5–12)
NEUTROPHILS NFR BLD AUTO: 4.63 10*3/MM3 (ref 1.7–7)
NEUTROPHILS NFR BLD AUTO: 65 % (ref 42.7–76)
NRBC BLD AUTO-RTO: 0 /100 WBC (ref 0–0.2)
PLATELET # BLD AUTO: 262 10*3/MM3 (ref 140–450)
PMV BLD AUTO: 9.2 FL (ref 6–12)
POTASSIUM SERPL-SCNC: 3.2 MMOL/L (ref 3.5–5.2)
PROT SERPL-MCNC: 7.7 G/DL (ref 6–8.5)
RBC # BLD AUTO: 4.77 10*6/MM3 (ref 3.77–5.28)
SODIUM SERPL-SCNC: 135 MMOL/L (ref 136–145)
WBC NRBC COR # BLD: 7.12 10*3/MM3 (ref 3.4–10.8)

## 2021-12-08 PROCEDURE — 86300 IMMUNOASSAY TUMOR CA 15-3: CPT

## 2021-12-08 PROCEDURE — 80053 COMPREHEN METABOLIC PANEL: CPT

## 2021-12-08 PROCEDURE — 85025 COMPLETE CBC W/AUTO DIFF WBC: CPT

## 2021-12-08 PROCEDURE — 36415 COLL VENOUS BLD VENIPUNCTURE: CPT

## 2021-12-08 PROCEDURE — 82378 CARCINOEMBRYONIC ANTIGEN: CPT

## 2021-12-08 NOTE — TELEPHONE ENCOUNTER
----- Message from Andrea Cartwright MD sent at 12/8/2021 12:03 PM CST -----  Potassium 3.2.  Fax CMP to PCP.

## 2021-12-09 LAB — CANCER AG27-29 SERPL-ACNC: 24.4 U/ML (ref 0–38.6)

## 2021-12-15 ENCOUNTER — OFFICE VISIT (OUTPATIENT)
Dept: ONCOLOGY | Facility: CLINIC | Age: 76
End: 2021-12-15

## 2021-12-15 VITALS
BODY MASS INDEX: 37.8 KG/M2 | SYSTOLIC BLOOD PRESSURE: 132 MMHG | DIASTOLIC BLOOD PRESSURE: 68 MMHG | TEMPERATURE: 97.6 F | HEART RATE: 74 BPM | OXYGEN SATURATION: 97 % | HEIGHT: 61 IN | WEIGHT: 200.2 LBS | RESPIRATION RATE: 18 BRPM

## 2021-12-15 DIAGNOSIS — Z17.0 MALIGNANT NEOPLASM OF UPPER-INNER QUADRANT OF RIGHT BREAST IN FEMALE, ESTROGEN RECEPTOR POSITIVE (HCC): Primary | ICD-10-CM

## 2021-12-15 DIAGNOSIS — C50.211 MALIGNANT NEOPLASM OF UPPER-INNER QUADRANT OF RIGHT BREAST IN FEMALE, ESTROGEN RECEPTOR POSITIVE (HCC): Primary | ICD-10-CM

## 2021-12-15 PROCEDURE — 99214 OFFICE O/P EST MOD 30 MIN: CPT | Performed by: INTERNAL MEDICINE

## 2022-03-02 ENCOUNTER — OFFICE VISIT (OUTPATIENT)
Dept: FAMILY MEDICINE CLINIC | Facility: CLINIC | Age: 77
End: 2022-03-02

## 2022-03-02 VITALS
HEIGHT: 61 IN | DIASTOLIC BLOOD PRESSURE: 74 MMHG | SYSTOLIC BLOOD PRESSURE: 128 MMHG | WEIGHT: 201 LBS | RESPIRATION RATE: 16 BRPM | HEART RATE: 67 BPM | BODY MASS INDEX: 37.95 KG/M2 | OXYGEN SATURATION: 96 %

## 2022-03-02 DIAGNOSIS — E11.22 TYPE 2 DIABETES MELLITUS WITH STAGE 3 CHRONIC KIDNEY DISEASE, WITHOUT LONG-TERM CURRENT USE OF INSULIN, UNSPECIFIED WHETHER STAGE 3A OR 3B CKD: ICD-10-CM

## 2022-03-02 DIAGNOSIS — E66.01 MORBIDLY OBESE: ICD-10-CM

## 2022-03-02 DIAGNOSIS — N18.31 STAGE 3A CHRONIC KIDNEY DISEASE: ICD-10-CM

## 2022-03-02 DIAGNOSIS — E03.9 ACQUIRED HYPOTHYROIDISM: ICD-10-CM

## 2022-03-02 DIAGNOSIS — N18.30 TYPE 2 DIABETES MELLITUS WITH STAGE 3 CHRONIC KIDNEY DISEASE, WITHOUT LONG-TERM CURRENT USE OF INSULIN, UNSPECIFIED WHETHER STAGE 3A OR 3B CKD: ICD-10-CM

## 2022-03-02 DIAGNOSIS — E78.2 MIXED HYPERLIPIDEMIA: Primary | ICD-10-CM

## 2022-03-02 PROCEDURE — 99214 OFFICE O/P EST MOD 30 MIN: CPT | Performed by: FAMILY MEDICINE

## 2022-03-03 LAB
ALBUMIN SERPL-MCNC: 4.2 G/DL (ref 3.5–5.2)
ALBUMIN/GLOB SERPL: 1.4 G/DL
ALP SERPL-CCNC: 79 U/L (ref 39–117)
ALT SERPL-CCNC: 15 U/L (ref 1–33)
AST SERPL-CCNC: 17 U/L (ref 1–32)
BASOPHILS # BLD AUTO: 0.04 10*3/MM3 (ref 0–0.2)
BASOPHILS NFR BLD AUTO: 0.5 % (ref 0–1.5)
BILIRUB SERPL-MCNC: 0.8 MG/DL (ref 0–1.2)
BUN SERPL-MCNC: 20 MG/DL (ref 8–23)
BUN/CREAT SERPL: 14.6 (ref 7–25)
CALCIUM SERPL-MCNC: 10.3 MG/DL (ref 8.6–10.5)
CHLORIDE SERPL-SCNC: 97 MMOL/L (ref 98–107)
CHOLEST SERPL-MCNC: 155 MG/DL (ref 0–200)
CHOLEST/HDLC SERPL: 3.37 {RATIO}
CO2 SERPL-SCNC: 26 MMOL/L (ref 22–29)
CREAT SERPL-MCNC: 1.37 MG/DL (ref 0.57–1)
EGFR GENE MUT ANL BLD/T: 40.3 ML/MIN/1.73
EOSINOPHIL # BLD AUTO: 0.2 10*3/MM3 (ref 0–0.4)
EOSINOPHIL NFR BLD AUTO: 2.6 % (ref 0.3–6.2)
ERYTHROCYTE [DISTWIDTH] IN BLOOD BY AUTOMATED COUNT: 14.3 % (ref 12.3–15.4)
GLOBULIN SER CALC-MCNC: 2.9 GM/DL
GLUCOSE SERPL-MCNC: 78 MG/DL (ref 65–99)
HBA1C MFR BLD: 5.4 % (ref 4.8–5.6)
HCT VFR BLD AUTO: 39.5 % (ref 34–46.6)
HCV AB S/CO SERPL IA: <0.1 S/CO RATIO (ref 0–0.9)
HDLC SERPL-MCNC: 46 MG/DL (ref 40–60)
HGB BLD-MCNC: 13.1 G/DL (ref 12–15.9)
IMM GRANULOCYTES # BLD AUTO: 0.02 10*3/MM3 (ref 0–0.05)
IMM GRANULOCYTES NFR BLD AUTO: 0.3 % (ref 0–0.5)
LDLC SERPL CALC-MCNC: 90 MG/DL (ref 0–100)
LYMPHOCYTES # BLD AUTO: 1.82 10*3/MM3 (ref 0.7–3.1)
LYMPHOCYTES NFR BLD AUTO: 23.6 % (ref 19.6–45.3)
MCH RBC QN AUTO: 27.6 PG (ref 26.6–33)
MCHC RBC AUTO-ENTMCNC: 33.2 G/DL (ref 31.5–35.7)
MCV RBC AUTO: 83.3 FL (ref 79–97)
MICROALBUMIN UR-MCNC: 9.9 UG/ML
MONOCYTES # BLD AUTO: 0.59 10*3/MM3 (ref 0.1–0.9)
MONOCYTES NFR BLD AUTO: 7.7 % (ref 5–12)
NEUTROPHILS # BLD AUTO: 5.04 10*3/MM3 (ref 1.7–7)
NEUTROPHILS NFR BLD AUTO: 65.3 % (ref 42.7–76)
NRBC BLD AUTO-RTO: 0 /100 WBC (ref 0–0.2)
PLATELET # BLD AUTO: 273 10*3/MM3 (ref 140–450)
POTASSIUM SERPL-SCNC: 3.8 MMOL/L (ref 3.5–5.2)
PROT SERPL-MCNC: 7.1 G/DL (ref 6–8.5)
RBC # BLD AUTO: 4.74 10*6/MM3 (ref 3.77–5.28)
SODIUM SERPL-SCNC: 138 MMOL/L (ref 136–145)
TRIGL SERPL-MCNC: 102 MG/DL (ref 0–150)
TSH SERPL DL<=0.005 MIU/L-ACNC: 3.22 UIU/ML (ref 0.27–4.2)
VLDLC SERPL CALC-MCNC: 19 MG/DL (ref 5–40)
WBC # BLD AUTO: 7.71 10*3/MM3 (ref 3.4–10.8)

## 2022-03-03 NOTE — PROGRESS NOTES
Subjective   Cee Quiles is a 77 y.o. female.     Chief Complaint   Patient presents with   • Hypertension     6 mo f/u         History of Present Illness     she notes good bp control without cp or ha---toleiang synthroid without cxp--she thinks her bs are stable--she is seieng her oncologist for breast cancer hx      Current Outpatient Medications:   •  amLODIPine (NORVASC) 5 MG tablet, , Disp: , Rfl:   •  atorvastatin (LIPITOR) 10 MG tablet, TAKE 1 TABLET BY MOUTH  DAILY, Disp: 90 tablet, Rfl: 3  •  Bystolic 20 MG tablet, TAKE 1 TABLET BY MOUTH  DAILY, Disp: 90 tablet, Rfl: 3  •  calcium citrate-vitamin d (CALCITRATE) 315-250 MG-UNIT tablet tablet, Take  by mouth Daily., Disp: , Rfl:   •  Denosumab (PROLIA SC), Inject  under the skin into the appropriate area as directed., Disp: , Rfl:   •  furosemide (LASIX) 20 MG tablet, Take 20 mg by mouth 2 (Two) Times a Day., Disp: , Rfl:   •  glipizide (GLUCOTROL XL) 2.5 MG 24 hr tablet, TAKE 1 TABLET BY MOUTH  DAILY, Disp: 90 tablet, Rfl: 3  •  letrozole (FEMARA) 2.5 MG tablet, TAKE 1 TABLET BY MOUTH  DAILY, Disp: 90 tablet, Rfl: 3  •  levothyroxine (SYNTHROID, LEVOTHROID) 100 MCG tablet, TAKE 1 TABLET BY MOUTH  DAILY, Disp: 90 tablet, Rfl: 3  •  meclizine (ANTIVERT) 25 MG tablet, Take 25 mg by mouth 3 (Three) Times a Day As Needed for dizziness., Disp: , Rfl:   •  hydroCHLOROthiazide (HYDRODIURIL) 25 MG tablet, TAKE 1 TABLET BY MOUTH  DAILY, Disp: 90 tablet, Rfl: 3  Allergies   Allergen Reactions   • Keflex [Cephalexin] Anaphylaxis   • Lortab [Hydrocodone-Acetaminophen] Nausea Only       Patient's Body mass index is 37.98 kg/m². indicating that she is obese (BMI >30). Obesity-related health conditions include the following: hypertension, diabetes mellitus and dyslipidemias. Obesity is unchanged. BMI is is above average; BMI management plan is completed. We discussed portion control and increasing exercise..      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, left     WRIST   • Cancer (HCC)     LOBULAR RIGHT BREAST   • Chronic knee pain     BILATERAL   • Colon polyp    • Cyst of ovary, left    • Diabetes (HCC)    • Diabetes mellitus (HCC)    • Dizziness    • Elevated cholesterol    • Gallstones    • Hx of colonic polyps    • Hyperlipidemia    • Hypertension    • Hypertension    • Hypothyroid    • Kidney disease    • Long term (current) use of aromatase inhibitors 4/23/2020   • Malignant neoplasm of upper-inner quadrant of right breast in female, estrogen receptor positive (HCC) 4/23/2020   • Osteopenia of neck of femur 4/23/2020   • PONV (postoperative nausea and vomiting)    • Torn meniscus      Past Surgical History:   Procedure Laterality Date   • CHOLECYSTECTOMY     • COLONOSCOPY W/ POLYPECTOMY  02/08/2012    Pedunculated polyp cecal pit, Hyperplastic polyp at 15 cm repeat exam in 5 years   • KNEE SURGERY Right    • MASTECTOMY W/ SENTINEL NODE BIOPSY Bilateral 2/12/2019    Procedure: BILATERAL MASTECTOMY WITH RIGHT BREAST SENTINEL LYMPH NODE BIOPSY - RADIOLOGIST WILL INJECT;  Surgeon: Michael Hodges MD;  Location: [facility] OR;  Service: General   • OVARIAN CYST DRAINAGE Left    • TUMOR REMOVAL Left        Review of Systems   Constitutional: Negative.    HENT: Negative.    Eyes: Negative.    Respiratory: Negative.    Cardiovascular: Negative.    Gastrointestinal: Negative.    Endocrine: Negative.    Genitourinary: Negative.    Musculoskeletal: Negative.    Skin: Negative.    Allergic/Immunologic: Negative.    Neurological: Negative.    Hematological: Negative.    Psychiatric/Behavioral: Negative.        Objective  /74   Pulse 67   Resp 16   Ht 154.9 cm (61\")   Wt 91.2 kg (201 lb)   SpO2 96%   BMI 37.98 kg/m²   Physical Exam  Vitals and nursing note reviewed.   Constitutional:       Appearance: Normal appearance. She is normal weight.   HENT:      Head: Normocephalic and atraumatic.      Nose: Nose normal.      " Mouth/Throat:      Mouth: Mucous membranes are moist.   Eyes:      Conjunctiva/sclera: Conjunctivae normal.      Pupils: Pupils are equal, round, and reactive to light.   Cardiovascular:      Rate and Rhythm: Normal rate and regular rhythm.      Pulses: Normal pulses.      Heart sounds: Normal heart sounds.   Pulmonary:      Effort: Pulmonary effort is normal.      Breath sounds: Normal breath sounds.   Abdominal:      General: Abdomen is flat.      Palpations: Abdomen is soft.   Musculoskeletal:         General: Normal range of motion.      Cervical back: Normal range of motion and neck supple.   Skin:     General: Skin is warm and dry.      Capillary Refill: Capillary refill takes less than 2 seconds.   Neurological:      General: No focal deficit present.      Mental Status: She is alert.   Psychiatric:         Mood and Affect: Mood normal.         Assessment/Plan   Diagnoses and all orders for this visit:    1. Mixed hyperlipidemia (Primary)  -     CBC & Differential  -     Comprehensive metabolic panel  -     Hemoglobin A1c  -     Lipid Panel With / Chol / HDL Ratio  -     Hepatitis C Antibody  -     TSH  -     MicroAlbumin, Urine, Random - Urine, Clean Catch    2. Type 2 diabetes mellitus with stage 3 chronic kidney disease, without long-term current use of insulin, unspecified whether stage 3a or 3b CKD (HCC)  -     CBC & Differential  -     Comprehensive metabolic panel  -     Hemoglobin A1c  -     Lipid Panel With / Chol / HDL Ratio  -     Hepatitis C Antibody  -     TSH  -     MicroAlbumin, Urine, Random - Urine, Clean Catch    3. Morbidly obese (HCC)  -     CBC & Differential  -     Comprehensive metabolic panel  -     Hemoglobin A1c  -     Lipid Panel With / Chol / HDL Ratio  -     Hepatitis C Antibody  -     TSH  -     MicroAlbumin, Urine, Random - Urine, Clean Catch    4. Stage 3a chronic kidney disease (HCC)  -     CBC & Differential  -     Comprehensive metabolic panel  -     Hemoglobin A1c  -      Lipid Panel With / Chol / HDL Ratio  -     Hepatitis C Antibody  -     TSH  -     MicroAlbumin, Urine, Random - Urine, Clean Catch    5. Acquired hypothyroidism  -     CBC & Differential  -     Comprehensive metabolic panel  -     Hemoglobin A1c  -     Lipid Panel With / Chol / HDL Ratio  -     Hepatitis C Antibody  -     TSH  -     MicroAlbumin, Urine, Random - Urine, Clean Catch      She will monito]r bp and bs ands keep me infrmed  She will moinitor for heat and cold intaolnce  She will l keep followupu with oncologist.           Orders Placed This Encounter   Procedures   • Comprehensive metabolic panel     Order Specific Question:   Release to patient     Answer:   Immediate   • Hemoglobin A1c     Order Specific Question:   Release to patient     Answer:   Immediate   • Lipid Panel With / Chol / HDL Ratio     Order Specific Question:   Release to patient     Answer:   Immediate   • Hepatitis C Antibody     Order Specific Question:   Release to patient     Answer:   Immediate   • TSH     Order Specific Question:   Release to patient     Answer:   Immediate   • MicroAlbumin, Urine, Random - Urine, Clean Catch     Order Specific Question:   Release to patient     Answer:   Immediate   • CBC & Differential       Follow up: 6 month(s)

## 2022-04-04 NOTE — PROGRESS NOTES
[facility]EMATOLOGY & ONCOLOGY  [address]  [place] [id number] [phone]    Patient Name: Cee Quiles  Encounter Date: 04/13/2022  YOB: 1945  Patient Number: [id number]      REASON FOR FOLLOW-UP: Cee Quiles is a pleasant 77-year-old  female who is seen on followup for Stage IA right breast cancer, receptor positive, and HER-2/jeremy negative.  Low recurrence score of 6, absolute benefit of chemo is less than 1%.   She is on adjuvant letrozole for the past 36.5 months. She is also seen for anemia from iron deficiency and chronic kidney disease.  She is off iron for the past 20 months.  She is seen alone.  She is a reliable historian.         Oncology/Hematology History Overview Note   DIAGNOSTIC ABNORMALITIES:  The patient was found to have a right breast mass by mammogram at [facility].   Right breast core biopsy 01/17/2019 showed infiltrating lobular carcinoma, grade 1. Greatest dimension of tumor measured 11.1 mm. ER 98%, OH 92%, HER-2/jeremy +1 and negative FISH, and Ki-67 at 7%,. Oncotype DX report. Low recurrence score of 6, absolute benefit of chemo is less than 1%  Breast MRI [facility] 01/30/2019, 1.8 x 0.7 x 0.6 cm, non mass-like linear enhancement at 1 o'clock. Second suspicious area of enhancement in the right breast just behind the nipple measuring 1 x 1 x 0.9 cm. Two areas of linear non mass-like enhancement has anterior depth at 1 o'clock in the left breast.   The patient was seen by Dr. Hodges 02/01/2019. Planned for bilateral mastectomy and sentinel lymph node evaluation.   Ultrasound biopsy left breast showed atypical lobular hyperplasia. No histologic evidence of malignancy.   The patient had a followup with Dr. Hodges 02/13/2019.  CBC 02/13/2019 revealed a WBC of 10.6, hemoglobin 12.2, hematocrit 36.8, MCV 86.4, platelet count 211,000 with ANC of 9.4. CMP  remarkable for a GFR of 46 mL/minute.  Pathology report 02/15/2019 left breast showed no evidence of in situ or invasive carcinoma. Right breast showed an infiltrating lobular carcinoma, grade 1 with residual 8 mm.       PREVIOUS INTERVENTIONS:   Bilateral mastectomy with right breast sentinel lymph node biopsy on 02/12/2019.  Adjuvant Femara 2.5 mg p.o. daily 03/22/2019 through present.      PREVIOUS INTERVENTIONS:  Ferrous sulfate 325 mg daily 04/22/2020 through 05/20/2020.  Resume 07/17/2020 through 08/20/2020.     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 breast in female, estrogen receptor positive (CMS/HCC)     4/24/2020 -  Chemotherapy    OP SUPPORTIVE Denosumab (Prolia) Q6M         PAST MEDICAL HISTORY:  ALLERGIES:  Allergies   Allergen Reactions   • Keflex [Cephalexin] Anaphylaxis   • Lortab [Hydrocodone-Acetaminophen] Nausea Only     CURRENT MEDICATIONS:  Outpatient Encounter Medications as of 4/13/2022   Medication Sig Dispense Refill   • amLODIPine (NORVASC) 5 MG tablet      • atorvastatin (LIPITOR) 10 MG tablet TAKE 1 TABLET BY MOUTH  DAILY 90 tablet 3   • Bystolic 20 MG tablet TAKE 1 TABLET BY MOUTH  DAILY 90 tablet 3   • calcium citrate-vitamin d (CALCITRATE) 315-250 MG-UNIT tablet tablet Take  by mouth Daily.     • Denosumab (PROLIA SC) Inject  under the skin into the appropriate area as directed.     • furosemide (LASIX) 20 MG tablet Take 20 mg by mouth 2 (Two) Times a Day.     • glipizide (GLUCOTROL XL) 2.5 MG 24 hr tablet TAKE 1 TABLET BY MOUTH  DAILY 90 tablet 3   • hydroCHLOROthiazide (HYDRODIURIL) 25 MG tablet TAKE 1 TABLET BY MOUTH  DAILY 90 tablet 3   • letrozole (FEMARA) 2.5 MG tablet TAKE 1 TABLET BY MOUTH  DAILY 90 tablet 3   • levothyroxine (SYNTHROID, LEVOTHROID) 100 MCG  tablet TAKE 1 TABLET BY MOUTH  DAILY 90 tablet 3   • meclizine (ANTIVERT) 25 MG tablet Take 25 mg by mouth 3 (Three) Times a Day As Needed for dizziness.       No facility-administered encounter medications on file as of 4/13/2022.     ADULT ILLNESSES:  Patient Active Problem List   Diagnosis Code   • Type 2 diabetes mellitus with diabetic chronic kidney disease ([facility]) E11.22   • Essential hypertension I10   • Acquired hypothyroidism E03.9   • Chronic pain of right knee M25.561, G89.29   • CKD (chronic kidney disease) stage 3, GFR 30-59 ml/min ([facility]) N18.30   • Mixed hyperlipidemia E78.2   • Morbidly obese ([facility]) E66.01   • Breast CA ([facility]) C50.919   • Postmenopausal Z78.0   • Hypomagnesemia E83.42   • Osteopenia of neck of femur M85.859   • Malignant neoplasm of upper-inner quadrant of right breast in female, estrogen receptor positive ([facility]) C50.211, Z17.0   • Long term (current) use of aromatase inhibitors Z79.811     SURGERIES:  Past Surgical History:   Procedure Laterality Date   • CHOLECYSTECTOMY     • COLONOSCOPY W/ POLYPECTOMY  02/08/2012    Pedunculated polyp cecal pit, Hyperplastic polyp at 15 cm repeat exam in 5 years   • KNEE SURGERY Right    • MASTECTOMY W/ SENTINEL NODE BIOPSY Bilateral 2/12/2019    Procedure: BILATERAL MASTECTOMY WITH RIGHT BREAST SENTINEL LYMPH NODE BIOPSY - RADIOLOGIST WILL INJECT;  Surgeon: Michael Hodges MD;  Location: [facility] OR;  Service: General   • OVARIAN CYST DRAINAGE Left    • TUMOR REMOVAL Left      HEALTH MAINTENANCE ITEMS:  Health Maintenance Due   Topic Date Due   • TDAP/TD VACCINES (1 - Tdap) Never done   • ZOSTER VACCINE (1 of 2) Never done   • DIABETIC EYE EXAM  10/16/2020   • Pneumococcal Vaccine 65+ (2 - PPSV23 or PCV20) 11/18/2020   • COVID-19 Vaccine (3 - Booster) 08/22/2021   • ANNUAL WELLNESS VISIT  09/18/2021   • DIABETIC FOOT EXAM  02/25/2022       <no information>  Last Completed Colonoscopy          COLORECTAL CANCER SCREENING (COLONOSCOPY - Every 5 Years)  "Next due on 8/14/2022 04/24/2020  Occult Blood X 3, Stool - Stool, Per Rectum    08/14/2017  SCANNED - COLONOSCOPY    02/08/2012  SCANNED - COLONOSCOPY    01/05/2009  SCANNED - COLONOSCOPY              Immunization History   Administered Date(s) Administered   • COVID-19 (MODERNA) 1st, 2nd, 3rd Dose Only 02/23/2021, 03/22/2021   • FLUAD TRI 65YR+ 11/18/2019   • Flu Vaccine Quad PF >36MO 11/13/2017   • Fluad Quad 65+ 11/18/2019   • Fluzone High Dose =>65 Years ([employer] ONLY) 10/23/2015, 10/31/2016, 10/31/2018   • Fluzone Split Quad (Multi-dose) 11/13/2017   • Influenza Quad Vaccine (Inpatient) 11/13/2017   • Pneumococcal Conjugate 13-Valent (PCV13) 11/18/2019     Last Completed Mammogram          Discontinued - MAMMOGRAM  Discontinued    01/30/2019  MRI Breast Bilateral With & Without Contrast    12/21/2018  Mammo diagnostic digital tomosynthesis right w CAD    12/17/2018  Mammo Screening Bilateral With CAD    12/16/2016  SCANNED - MAMMO                  FAMILY HISTORY:  Family History   Problem Relation Age of Onset   • Colon cancer Neg Hx    • Colon polyps Neg Hx      SOCIAL HISTORY:  Social History     Socioeconomic History   • Marital status:    Tobacco Use   • Smoking status: Never Smoker   • Smokeless tobacco: Never Used   Substance and Sexual Activity   • Alcohol use: No   • Drug use: No   • Sexual activity: Defer       REVIEW OF SYSTEMS:    Review of Systems   Constitutional: Negative for chills, fatigue and fever.        \"I feel pretty good.\"   HENT: Negative for congestion, mouth sores and trouble swallowing.    Eyes: Negative for redness and visual disturbance.   Respiratory: Negative for cough and shortness of breath.    Cardiovascular: Negative for chest pain and palpitations.   Gastrointestinal: Negative for abdominal pain, nausea and vomiting.   Endocrine: Negative for polydipsia and polyphagia.   Genitourinary: Negative for difficulty urinating, dysuria and flank pain.   Musculoskeletal: " "Negative for gait problem and joint swelling.   Skin: Negative for pallor.   Allergic/Immunologic: Negative for food allergies.   Neurological: Negative for dizziness, speech difficulty and weakness.   Hematological: Negative for adenopathy. Does not bruise/bleed easily.   Psychiatric/Behavioral: Negative for agitation, confusion and hallucinations.       VITAL SIGNS: /70   Pulse 74   Temp 97.5 °F (36.4 °C)   Resp 18   Ht 154.9 cm (61\")   Wt 91.6 kg (202 lb)   SpO2 96%   Breastfeeding No   BMI 38.17 kg/m²   Pain Score    04/13/22 1042   PainSc: 0-No pain       PHYSICAL EXAMINATION:     Physical Exam  Vitals reviewed.   Constitutional:       General: She is not in acute distress.  HENT:      Head: Normocephalic and atraumatic.   Eyes:      General: No scleral icterus.  Cardiovascular:      Rate and Rhythm: Normal rate.   Pulmonary:      Effort: No respiratory distress.      Breath sounds: No wheezing or rales.      Comments: Bilateral mastectomy scars. Examined with Page.   Abdominal:      General: Bowel sounds are normal.      Palpations: Abdomen is soft.   Musculoskeletal:         General: No swelling.      Cervical back: Neck supple.   Skin:     General: Skin is warm and dry.      Coloration: Skin is not pale.   Neurological:      Mental Status: She is alert and oriented to person, place, and time.   Psychiatric:         Mood and Affect: Mood normal.         Behavior: Behavior normal.         Thought Content: Thought content normal.         Judgment: Judgment normal.         LABS    Lab Results - Last 18 Months   Lab Units 04/06/22  1324 03/02/22  1256 12/08/21  1043 08/10/21  1013 04/08/21  1115 12/10/20  1233   HEMOGLOBIN g/dL 13.3 13.1 12.9 13.3 12.1 12.1   HEMATOCRIT % 38.6 39.5 39.1 38.6 34.3 36.0   MCV fL 83.4 83.3 82.0 81.6 81.9 84.7   WBC 10*3/mm3 7.47 7.71 7.12 8.24 6.67 6.85   RDW % 13.7 14.3 13.9 13.1 13.2 13.6   MPV fL 9.2  --  9.2 9.1 9.2 9.2   PLATELETS 10*3/mm3 267 273 262 269 240 272 "   IMM GRAN % % 0.5  --  0.8* 0.7* 0.4 0.4   NEUTROS ABS 10*3/mm3 4.77 5.04 4.63 5.70 4.32 4.37   LYMPHS ABS 10*3/mm3 1.80 1.82 1.60 1.74 1.51 1.64   MONOS ABS 10*3/mm3 0.52 0.59 0.49 0.52 0.43 0.48   EOS ABS 10*3/mm3 0.31 0.20 0.31 0.19 0.34 0.28   BASOS ABS 10*3/mm3 0.03 0.04 0.03 0.03 0.04 0.05   IMMATURE GRANS (ABS) 10*3/mm3 0.04  --  0.06* 0.06* 0.03 0.03   NRBC /100 WBC 0.0 0.0 0.0 0.0 0.0 0.0       Lab Results - Last 18 Months   Lab Units 04/06/22  1324 03/02/22  1256 12/08/21  1043 10/29/21  1346 08/26/21  0830 08/13/21  0809 08/10/21  1013 04/08/21  1115 12/10/20  1233   GLUCOSE mg/dL 117* 78 132* 129* 114* 115* 122* 107* 119*   SODIUM mmol/L 138 138 135* 138 139 138 139 138 138   POTASSIUM mmol/L 3.3* 3.8 3.2* 3.3* 3.7 3.4* 3.3* 3.7 4.0   TOTAL CO2 mmol/L  --  26.0  --   --  27.2 26.3  --   --   --    CO2 mmol/L 30.0*  --  25.0 27.0  --   --  26.0 27.0 27.0   CHLORIDE mmol/L 97* 97* 98 98 99 101 100 100 102   ANION GAP mmol/L 11.0  --  12.0 13.0  --   --  13.0 11.0 9.0   CREATININE mg/dL 1.24* 1.37* 1.19* 1.20* 1.13* 1.14* 1.02* 1.35* 1.50*   BUN mg/dL 22 20 18 24* 16 23 18 27* 28*   BUN / CREAT RATIO  17.7 14.6 15.1 20.0 14.2 20.2 17.6 20.0 18.7   CALCIUM mg/dL 10.4 10.3 9.4 10.3 9.7 10.3 9.8 9.6 9.2   EGFR IF NONAFRICN AM mL/min/1.73  --   --  44* 44* 47* 47* 53* 38* 34*   ALK PHOS U/L 85 79 85 86 71  --  75 66 69   TOTAL PROTEIN g/dL 7.0  --  7.7 7.0  --   --  7.5 7.2 7.1   ALT (SGPT) U/L 11 15 8 12 10  --  12 9 10   AST (SGOT) U/L 15 17 15 15 18  --  16 17 14   BILIRUBIN mg/dL 0.8 0.8 0.7 0.6 0.8  --  0.8 0.6 0.4   ALBUMIN g/dL 4.30 4.20 4.00 4.10 4.40  --  4.50 3.90 4.10   GLOBULIN gm/dL 2.7  --  3.7 2.9  --   --  3.0 3.3 3.0       Lab Results - Last 18 Months   Lab Units 04/06/22  1324 12/08/21  1043 08/10/21  1013 04/08/21  1115 12/10/20  1233   CEA ng/mL 1.04 1.50 1.39 1.55 1.34       Lab Results - Last 18 Months   Lab Units 03/02/22  1256 08/26/21  0830 02/25/21  0839   TSH uIU/mL 3.220 2.580 1.880  "      Cee Quiles reports a pain score of 0.      ASSESSMENT:  1.   Carcinoma of the right breast, infiltrating lobular.  12 o'clock. Low recurrence score of 6, absolute benefit of chemo is less than 1%:  Current Stage: AJCC Stage IA (T1c N0, M0, G1)   Tumor size 1.91 cm.  Hormone Status: ER 98%, WA 92%, HER-2/jeremy negative by FISH.  Baseline Node Status: 0/2 positive.  Treatment status: Post bilateral mastectomy and right sentinel node biopsy.  On adjuvant Femara 2.5 mg 03/22/2019 through present.  Plan for 10 years.   2.   Normocytic anemia from chronic kidney disease Stage IV, GFR 22 mL/minute.   3.   Chronic kidney disease Stage IV, GFR 22 mL/minute 04/15/2020, contributing to anemia.  Erythropoietin 16.9 on 04/24/2020.  4.   Osteopenia, on letrozole. On denosumab.  5.   Performance status of 0.              PLAN:  1.    Re:  Tolerance to adjuvant letrozole. \"it's fine.\"  She had seen Dr. Sandoval 03/02/2022 for follow-up of type 2 diabetes and mixed hyperlipidemia.  She had seen Ms. Suha Mirza, BRAVO 02/02/2022.  GFR 48 mL/min.  2.    Re:  Heme status.  Hemoglobin 13.3, WBC 7.47 and platelet 267.  3.    Re:  Pre-office CMP.  GFR 45.5 mL/minute and K 3.3.  4.    Re:  Pre-office CEA at 1.04.  5.    Re:  Pre-office CA27-29 at 21.3.   6.   Retacrit 40,000 units subcutaneous weekly if hemoglobin below 10 and hematocrit below 30 to target a hemoglobin of of 11 and hematocrit of 33.  Move CBC with differential weekly if she starts Retacrit.  Monitor for hypertension.  7.   Schedule denosumab 60 mg subcutaneously every 6 months, bone density improved and still on Femara.  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  8.   Next bone density 03/2023 to follow osteopenia.  9.   Continue ongoing management per primary care physician and other specialists.  10. Plan of care discussed with patient.  Understanding expressed.  Patient agreeable to proceed.  11. Advance Care Planning   ACP " discussion was declined by the patient. Patient does not have an advance directive, information provided.   12. eRx for letrozole 2.5 mg p.o. daily, 90, 3 refills if needed.  Stop and call if intolerant.  Observe for worsening bone loss and hot flashes.  Plan for 10 years.  High benefit for extended therapy.   13. Return to office in 4 months with pre-office CBC with differential, CEA, CA27-29, and CMP.        I have reviewed the assessment and plan and verified the accuracy of it. No changes to assessment and plan since the information was documented. Andrea Cartwright MD 04/13/22        I spent 32 total minutes, face-to-face, caring for Cee today.  Greater than 50% of this time involved counseling and/or coordination of care as documented within this note regarding the patient's illness(es), pros and cons of various treatment options, instructions and/or risk reduction.            MD Suha Jones APRN Thomas Staton, MD

## 2022-04-06 ENCOUNTER — TELEPHONE (OUTPATIENT)
Dept: ONCOLOGY | Facility: CLINIC | Age: 77
End: 2022-04-06

## 2022-04-06 ENCOUNTER — LAB (OUTPATIENT)
Dept: LAB | Facility: HOSPITAL | Age: 77
End: 2022-04-06

## 2022-04-06 DIAGNOSIS — C50.211 MALIGNANT NEOPLASM OF UPPER-INNER QUADRANT OF RIGHT BREAST IN FEMALE, ESTROGEN RECEPTOR POSITIVE: ICD-10-CM

## 2022-04-06 DIAGNOSIS — Z17.0 MALIGNANT NEOPLASM OF UPPER-INNER QUADRANT OF RIGHT BREAST IN FEMALE, ESTROGEN RECEPTOR POSITIVE: ICD-10-CM

## 2022-04-06 LAB
ALBUMIN SERPL-MCNC: 4.3 G/DL (ref 3.5–5.2)
ALBUMIN/GLOB SERPL: 1.6 G/DL
ALP SERPL-CCNC: 85 U/L (ref 39–117)
ALT SERPL W P-5'-P-CCNC: 11 U/L (ref 1–33)
ANION GAP SERPL CALCULATED.3IONS-SCNC: 11 MMOL/L (ref 5–15)
AST SERPL-CCNC: 15 U/L (ref 1–32)
BASOPHILS # BLD AUTO: 0.03 10*3/MM3 (ref 0–0.2)
BASOPHILS NFR BLD AUTO: 0.4 % (ref 0–1.5)
BILIRUB SERPL-MCNC: 0.8 MG/DL (ref 0–1.2)
BUN SERPL-MCNC: 22 MG/DL (ref 8–23)
BUN/CREAT SERPL: 17.7 (ref 7–25)
CALCIUM SPEC-SCNC: 10.4 MG/DL (ref 8.6–10.5)
CEA SERPL-MCNC: 1.04 NG/ML
CHLORIDE SERPL-SCNC: 97 MMOL/L (ref 98–107)
CO2 SERPL-SCNC: 30 MMOL/L (ref 22–29)
CREAT SERPL-MCNC: 1.24 MG/DL (ref 0.57–1)
DEPRECATED RDW RBC AUTO: 41.2 FL (ref 37–54)
EGFRCR SERPLBLD CKD-EPI 2021: 45.5 ML/MIN/1.73
EOSINOPHIL # BLD AUTO: 0.31 10*3/MM3 (ref 0–0.4)
EOSINOPHIL NFR BLD AUTO: 4.1 % (ref 0.3–6.2)
ERYTHROCYTE [DISTWIDTH] IN BLOOD BY AUTOMATED COUNT: 13.7 % (ref 12.3–15.4)
GLOBULIN UR ELPH-MCNC: 2.7 GM/DL
GLUCOSE SERPL-MCNC: 117 MG/DL (ref 65–99)
HCT VFR BLD AUTO: 38.6 % (ref 34–46.6)
HGB BLD-MCNC: 13.3 G/DL (ref 12–15.9)
IMM GRANULOCYTES # BLD AUTO: 0.04 10*3/MM3 (ref 0–0.05)
IMM GRANULOCYTES NFR BLD AUTO: 0.5 % (ref 0–0.5)
LYMPHOCYTES # BLD AUTO: 1.8 10*3/MM3 (ref 0.7–3.1)
LYMPHOCYTES NFR BLD AUTO: 24.1 % (ref 19.6–45.3)
MCH RBC QN AUTO: 28.7 PG (ref 26.6–33)
MCHC RBC AUTO-ENTMCNC: 34.5 G/DL (ref 31.5–35.7)
MCV RBC AUTO: 83.4 FL (ref 79–97)
MONOCYTES # BLD AUTO: 0.52 10*3/MM3 (ref 0.1–0.9)
MONOCYTES NFR BLD AUTO: 7 % (ref 5–12)
NEUTROPHILS NFR BLD AUTO: 4.77 10*3/MM3 (ref 1.7–7)
NEUTROPHILS NFR BLD AUTO: 63.9 % (ref 42.7–76)
NRBC BLD AUTO-RTO: 0 /100 WBC (ref 0–0.2)
PLATELET # BLD AUTO: 267 10*3/MM3 (ref 140–450)
PMV BLD AUTO: 9.2 FL (ref 6–12)
POTASSIUM SERPL-SCNC: 3.3 MMOL/L (ref 3.5–5.2)
PROT SERPL-MCNC: 7 G/DL (ref 6–8.5)
RBC # BLD AUTO: 4.63 10*6/MM3 (ref 3.77–5.28)
SODIUM SERPL-SCNC: 138 MMOL/L (ref 136–145)
WBC NRBC COR # BLD: 7.47 10*3/MM3 (ref 3.4–10.8)

## 2022-04-06 PROCEDURE — 36415 COLL VENOUS BLD VENIPUNCTURE: CPT

## 2022-04-06 PROCEDURE — 85025 COMPLETE CBC W/AUTO DIFF WBC: CPT

## 2022-04-06 PROCEDURE — 82378 CARCINOEMBRYONIC ANTIGEN: CPT

## 2022-04-06 PROCEDURE — 86300 IMMUNOASSAY TUMOR CA 15-3: CPT

## 2022-04-06 PROCEDURE — 80053 COMPREHEN METABOLIC PANEL: CPT

## 2022-04-06 NOTE — TELEPHONE ENCOUNTER
----- Message from Andrea Cartwright MD sent at 4/6/2022  3:24 PM CDT -----  Fax CMP to nephrology. K 3.3.

## 2022-04-07 LAB — CANCER AG27-29 SERPL-ACNC: 21.3 U/ML (ref 0–38.6)

## 2022-04-13 ENCOUNTER — OFFICE VISIT (OUTPATIENT)
Dept: ONCOLOGY | Facility: CLINIC | Age: 77
End: 2022-04-13

## 2022-04-13 VITALS
HEART RATE: 74 BPM | SYSTOLIC BLOOD PRESSURE: 136 MMHG | RESPIRATION RATE: 18 BRPM | OXYGEN SATURATION: 96 % | DIASTOLIC BLOOD PRESSURE: 70 MMHG | TEMPERATURE: 97.5 F | WEIGHT: 202 LBS | HEIGHT: 61 IN | BODY MASS INDEX: 38.14 KG/M2

## 2022-04-13 DIAGNOSIS — C50.211 MALIGNANT NEOPLASM OF UPPER-INNER QUADRANT OF RIGHT BREAST IN FEMALE, ESTROGEN RECEPTOR POSITIVE: Primary | ICD-10-CM

## 2022-04-13 DIAGNOSIS — Z17.0 MALIGNANT NEOPLASM OF UPPER-INNER QUADRANT OF RIGHT BREAST IN FEMALE, ESTROGEN RECEPTOR POSITIVE: Primary | ICD-10-CM

## 2022-04-13 DIAGNOSIS — E83.42 HYPOMAGNESEMIA: Primary | ICD-10-CM

## 2022-04-13 PROCEDURE — 99214 OFFICE O/P EST MOD 30 MIN: CPT | Performed by: INTERNAL MEDICINE

## 2022-04-19 DIAGNOSIS — M85.851 OSTEOPENIA OF NECK OF RIGHT FEMUR: Primary | ICD-10-CM

## 2022-04-19 DIAGNOSIS — C50.211 MALIGNANT NEOPLASM OF UPPER-INNER QUADRANT OF RIGHT BREAST IN FEMALE, ESTROGEN RECEPTOR POSITIVE: ICD-10-CM

## 2022-04-19 DIAGNOSIS — Z79.811 LONG TERM (CURRENT) USE OF AROMATASE INHIBITORS: ICD-10-CM

## 2022-04-19 DIAGNOSIS — Z17.0 MALIGNANT NEOPLASM OF UPPER-INNER QUADRANT OF RIGHT BREAST IN FEMALE, ESTROGEN RECEPTOR POSITIVE: ICD-10-CM

## 2022-04-29 ENCOUNTER — INFUSION (OUTPATIENT)
Dept: ONCOLOGY | Facility: HOSPITAL | Age: 77
End: 2022-04-29

## 2022-04-29 ENCOUNTER — LAB (OUTPATIENT)
Dept: LAB | Facility: HOSPITAL | Age: 77
End: 2022-04-29

## 2022-04-29 DIAGNOSIS — M85.851 OSTEOPENIA OF NECK OF RIGHT FEMUR: Primary | ICD-10-CM

## 2022-04-29 DIAGNOSIS — Z17.0 MALIGNANT NEOPLASM OF UPPER-INNER QUADRANT OF RIGHT BREAST IN FEMALE, ESTROGEN RECEPTOR POSITIVE: ICD-10-CM

## 2022-04-29 DIAGNOSIS — E83.42 HYPOMAGNESEMIA: ICD-10-CM

## 2022-04-29 DIAGNOSIS — Z79.811 LONG TERM (CURRENT) USE OF AROMATASE INHIBITORS: ICD-10-CM

## 2022-04-29 DIAGNOSIS — M85.851 OSTEOPENIA OF NECK OF RIGHT FEMUR: ICD-10-CM

## 2022-04-29 DIAGNOSIS — C50.211 MALIGNANT NEOPLASM OF UPPER-INNER QUADRANT OF RIGHT BREAST IN FEMALE, ESTROGEN RECEPTOR POSITIVE: ICD-10-CM

## 2022-04-29 LAB
ALBUMIN SERPL-MCNC: 4 G/DL (ref 3.5–5.2)
ALBUMIN/GLOB SERPL: 1.3 G/DL
ALP SERPL-CCNC: 77 U/L (ref 39–117)
ALT SERPL W P-5'-P-CCNC: 13 U/L (ref 1–33)
ANION GAP SERPL CALCULATED.3IONS-SCNC: 15 MMOL/L (ref 5–15)
AST SERPL-CCNC: 16 U/L (ref 1–32)
BILIRUB SERPL-MCNC: 0.8 MG/DL (ref 0–1.2)
BUN SERPL-MCNC: 23 MG/DL (ref 8–23)
BUN/CREAT SERPL: 15 (ref 7–25)
CALCIUM SPEC-SCNC: 10.3 MG/DL (ref 8.6–10.5)
CHLORIDE SERPL-SCNC: 99 MMOL/L (ref 98–107)
CO2 SERPL-SCNC: 27 MMOL/L (ref 22–29)
CREAT SERPL-MCNC: 1.53 MG/DL (ref 0.57–1)
EGFRCR SERPLBLD CKD-EPI 2021: 35.3 ML/MIN/1.73
GLOBULIN UR ELPH-MCNC: 3 GM/DL
GLUCOSE SERPL-MCNC: 113 MG/DL (ref 65–99)
MAGNESIUM SERPL-MCNC: 1.3 MG/DL (ref 1.6–2.4)
PHOSPHATE SERPL-MCNC: 4.4 MG/DL (ref 2.5–4.5)
POTASSIUM SERPL-SCNC: 3.2 MMOL/L (ref 3.5–5.2)
PROT SERPL-MCNC: 7 G/DL (ref 6–8.5)
SODIUM SERPL-SCNC: 141 MMOL/L (ref 136–145)

## 2022-04-29 PROCEDURE — 36415 COLL VENOUS BLD VENIPUNCTURE: CPT

## 2022-04-29 PROCEDURE — 83735 ASSAY OF MAGNESIUM: CPT

## 2022-04-29 PROCEDURE — 25010000002 DENOSUMAB 60 MG/ML SOLUTION PREFILLED SYRINGE: Performed by: INTERNAL MEDICINE

## 2022-04-29 PROCEDURE — 96372 THER/PROPH/DIAG INJ SC/IM: CPT

## 2022-04-29 PROCEDURE — 84100 ASSAY OF PHOSPHORUS: CPT

## 2022-04-29 PROCEDURE — 80053 COMPREHEN METABOLIC PANEL: CPT

## 2022-04-29 RX ADMIN — DENOSUMAB 60 MG: 60 INJECTION SUBCUTANEOUS at 15:10

## 2022-04-29 NOTE — PROGRESS NOTES
1940 Discussed labs with Dr. Paula PORRAS to give Prolia injection and have patient follow up with her PCP for low magnesium and potassium. Patient aware to call her PCP.-Andreina SERRANO RN

## 2022-05-02 ENCOUNTER — TELEPHONE (OUTPATIENT)
Dept: ONCOLOGY | Facility: CLINIC | Age: 77
End: 2022-05-02

## 2022-05-02 ENCOUNTER — TELEPHONE (OUTPATIENT)
Dept: FAMILY MEDICINE CLINIC | Facility: CLINIC | Age: 77
End: 2022-05-02

## 2022-05-02 NOTE — TELEPHONE ENCOUNTER
----- Message from Andrea Cartwright MD sent at 5/1/2022  7:28 PM CDT -----  Fax CMP to PCP.  Potassium 3.2 magnesium 1.3.  On diuretics.

## 2022-05-02 NOTE — TELEPHONE ENCOUNTER
----- Message from Page Jacinto MA sent at 5/2/2022  7:39 AM CDT -----  Fax CMP to PCP.  Potassium 3.2 magnesium 1.3.  On diuretics.      Brian---if she sees nephrology you can forward to their office

## 2022-06-20 RX ORDER — ATORVASTATIN CALCIUM 10 MG/1
10 TABLET, FILM COATED ORAL DAILY
Qty: 90 TABLET | Refills: 3 | Status: SHIPPED | OUTPATIENT
Start: 2022-06-20 | End: 2022-09-01

## 2022-07-27 ENCOUNTER — OFFICE VISIT (OUTPATIENT)
Dept: SURGERY | Facility: CLINIC | Age: 77
End: 2022-07-27

## 2022-07-27 VITALS
HEART RATE: 66 BPM | BODY MASS INDEX: 38.18 KG/M2 | DIASTOLIC BLOOD PRESSURE: 72 MMHG | HEIGHT: 61 IN | OXYGEN SATURATION: 98 % | WEIGHT: 202.2 LBS | SYSTOLIC BLOOD PRESSURE: 128 MMHG

## 2022-07-27 DIAGNOSIS — C50.211 MALIGNANT NEOPLASM OF UPPER-INNER QUADRANT OF RIGHT BREAST IN FEMALE, ESTROGEN RECEPTOR POSITIVE: ICD-10-CM

## 2022-07-27 DIAGNOSIS — E66.01 MORBIDLY OBESE: Primary | ICD-10-CM

## 2022-07-27 DIAGNOSIS — Z17.0 MALIGNANT NEOPLASM OF UPPER-INNER QUADRANT OF RIGHT BREAST IN FEMALE, ESTROGEN RECEPTOR POSITIVE: ICD-10-CM

## 2022-07-27 DIAGNOSIS — Z17.0 MALIGNANT NEOPLASM OF UPPER-OUTER QUADRANT OF RIGHT BREAST IN FEMALE, ESTROGEN RECEPTOR POSITIVE: ICD-10-CM

## 2022-07-27 DIAGNOSIS — E11.22 TYPE 2 DIABETES MELLITUS WITH STAGE 3 CHRONIC KIDNEY DISEASE, WITHOUT LONG-TERM CURRENT USE OF INSULIN, UNSPECIFIED WHETHER STAGE 3A OR 3B CKD: ICD-10-CM

## 2022-07-27 DIAGNOSIS — N18.30 TYPE 2 DIABETES MELLITUS WITH STAGE 3 CHRONIC KIDNEY DISEASE, WITHOUT LONG-TERM CURRENT USE OF INSULIN, UNSPECIFIED WHETHER STAGE 3A OR 3B CKD: ICD-10-CM

## 2022-07-27 DIAGNOSIS — C50.411 MALIGNANT NEOPLASM OF UPPER-OUTER QUADRANT OF RIGHT BREAST IN FEMALE, ESTROGEN RECEPTOR POSITIVE: ICD-10-CM

## 2022-07-27 PROCEDURE — 99213 OFFICE O/P EST LOW 20 MIN: CPT | Performed by: SPECIALIST

## 2022-07-27 NOTE — PATIENT INSTRUCTIONS
Everything looks good currently 4 years out no problems with your incisions no evidence of any recurrences continue what you are doing I will see you back in 1 year congratulations on 4 years out.!  Keep it up.

## 2022-07-27 NOTE — PROGRESS NOTES
Office Established Patient Note:     Referring Provider: Dr. Sandoval    Chief complaint follow-up breast cancer    Subjective .     History of present illness:  Cee Quiles is a 77 y.o. female who is currently 4 years out from treatment of a breast cancer in the right central breast that was lobular carcinoma, bilateral mastectomy accomplished the tumor was infiltrating lobular carcinoma also atypical lobular neoplasm in the left breast she had 0 of 2 sentinel lymph nodes and she is here for follow-up.  She has had no problems she continues her follow-up with Dr. Cartwright she is on antihormonal treatment.  No evidence of any recurrence..    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 breast in female, estrogen receptor positive (HCC) 4/23/2020   • Osteopenia of neck of femur 4/23/2020   • PONV (postoperative nausea and vomiting)    • Torn meniscus    ,   Past Surgical History:   Procedure Laterality Date   • CHOLECYSTECTOMY     • COLONOSCOPY W/ POLYPECTOMY  02/08/2012    Pedunculated polyp cecal pit, Hyperplastic polyp at 15 cm repeat exam in 5 years   • KNEE SURGERY Right    • MASTECTOMY W/ SENTINEL NODE BIOPSY Bilateral 2/12/2019    Procedure: BILATERAL MASTECTOMY WITH RIGHT BREAST SENTINEL LYMPH NODE BIOPSY - RADIOLOGIST WILL INJECT;  Surgeon: Michael Hodges MD;  Location: [facility] OR;  Service: General   • OVARIAN CYST DRAINAGE Left    • TUMOR REMOVAL Left    ,   Family History  "  Problem Relation Age of Onset   • Colon cancer Neg Hx    • Colon polyps Neg Hx    ,   Social History     Tobacco Use   • Smoking status: Never Smoker   • Smokeless tobacco: Never Used   Substance Use Topics   • Alcohol use: No   • Drug use: No   , (Not in a hospital admission)   and Allergies:  Keflex [cephalexin] and Lortab [hydrocodone-acetaminophen]    Current Outpatient Medications:   •  amLODIPine (NORVASC) 5 MG tablet, , Disp: , Rfl:   •  atorvastatin (LIPITOR) 10 MG tablet, TAKE 1 TABLET BY MOUTH  DAILY, Disp: 90 tablet, Rfl: 3  •  Bystolic 20 MG tablet, TAKE 1 TABLET BY MOUTH  DAILY, Disp: 90 tablet, Rfl: 3  •  calcium citrate-vitamin d (CALCITRATE) 315-250 MG-UNIT tablet tablet, Take  by mouth Daily., Disp: , Rfl:   •  Denosumab (PROLIA SC), Inject  under the skin into the appropriate area as directed., Disp: , Rfl:   •  furosemide (LASIX) 20 MG tablet, Take 20 mg by mouth 2 (Two) Times a Day., Disp: , Rfl:   •  glipizide (GLUCOTROL XL) 2.5 MG 24 hr tablet, TAKE 1 TABLET BY MOUTH  DAILY, Disp: 90 tablet, Rfl: 3  •  hydroCHLOROthiazide (HYDRODIURIL) 25 MG tablet, TAKE 1 TABLET BY MOUTH  DAILY, Disp: 90 tablet, Rfl: 3  •  letrozole (FEMARA) 2.5 MG tablet, TAKE 1 TABLET BY MOUTH  DAILY, Disp: 90 tablet, Rfl: 3  •  levothyroxine (SYNTHROID, LEVOTHROID) 100 MCG tablet, TAKE 1 TABLET BY MOUTH  DAILY, Disp: 90 tablet, Rfl: 3  •  meclizine (ANTIVERT) 25 MG tablet, Take 25 mg by mouth 3 (Three) Times a Day As Needed for dizziness., Disp: , Rfl:     Objective     Vital Signs   /72 (BP Location: Left arm, Patient Position: Sitting, Cuff Size: Adult)   Pulse 66   Ht 154.9 cm (61\")   Wt 91.7 kg (202 lb 3.2 oz)   SpO2 98%   BMI 38.21 kg/m²      Physical Exam:Physical Exam  Chest:             Well-healed surgical incision right and left anterior chest, minimal redundant skin, some on the left axillary aspect, but otherwise unremarkable, no palpable abnormalities in the skin or axilla.  No nodules along the " "scar.    Results Review:  Result Review :  Lab Results   Component Value Date    FINALDX  02/12/2019     1.  Left breast, simple mastectomy:  Benign breast with incidental intraductal papilloma.  Hematoma.  No evidence of in situ or invasive carcinoma.    2.  [place] lymph node, right breast, excision:  Two lymph nodes, negative for metastatic carcinoma (0/2).    3.  Right breast, simple mastectomy:  Infiltrating lobular carcinoma, grade 1 (8 mm).  Associated atypical lobular hyperplasia.  Margins free of tumor.  No lymph vascular space invasion or skin invasion.  Incidental benign fibrocystic and fibroadenomatoid changes.      AJCC pathologic stage:  pT1c N0(sn)      Comment: The AJCC pathologic staging listed above is from a combination of the prior biopsy report (11.1 mm tumor) and the current surgical pathology case.      GROSSDES  02/12/2019        Specimen #1 is received in a formalin filled container, labeled with the patient's name, date of birth, and \"left breast tissue\".  The breast was excised at 1138 and has a cold ischemic time of less than 1 hour.  The specimen has a formalin fixation time of 34 hours.  The specimen consists of a left breast measuring 18.9 cm medial to lateral by 17.5 cm anterior to inferior by 4.3 cm anterior to posterior.  The breast weighs 1135 grams.  The attached skin ellipse measures 20.7 cm medial to lateral by 14.8 cm superior to inferior.  The nipple appears unremarkable.  A palpable area in the 3 o'clock aspect of the ellipse represents possible surgical defect and measures 0.5 x 0.4 cm.  The deep margin is yellow-pink and intact.  The deep margin is inked blue.  Sectioning reveals diffuse yellow-pink, soft and lobulated fibroadipose tissue with no lesions identified.  A small amount of fibrocystic tissue is identified in the central breast.  A pocket of blood clot is identified in the 3 to 6 o'clock quadrant measuring 2.0 cm in greatest dimension.  The area lies posterior " "to the previously described defect on the skin surface.  4.5 cm from the deep margin and 1.8 cm from the skin surface.     The nipple shaved, cross sectioned and submitted in block 1A.  The base of nipple is shaved and.  Representative section of skin defect and section of unremarkable skin are submitted in block 1C.  Representative section of soft tissue hematoma behind skin defect is submitted in block 1D.  Representative sections of fibrocystic tissue in the central breast is submitted in block 1E.  Random upper outer quadrant is submitted in block 1F.  Random lower outer quadrant is submitted in block 1G.  Random upper inner quadrant is submitted in block 1H.  Random lower inner quadrant is submitted in block 1I.    Specimen #2 is received in a formalin filled container, labeled with the patient's name, date of birth, and \"right breast sentinel node\".  The specimen consists of a yellow-pink fatty mass measuring 3.4 x 2.5 x 1.6 cm.  The external surface is yellow-pink, soft and lobulated.  Dissection reveals 2 lymph node candidates measuring 0.8 cm in greatest dimension and 2.1 cm in greatest a suture is found adjacent to the largest lymph node candidate and is designated sentinel per surgical note the cut surface of both lymph node candidates show 75% fatty replacement with a rim of blue-gray residual lymph node.  The sentinel lymph node is bisected and totally submitted in blocks 2A and 2B.  The second lymph node candidate is bisected and totally submitted in block 2C.    Specimen #3 is received in a formalin filled container, labeled with the patient's name, date of birth, and \"right breast\".  The specimen was excised at 1305 and has a cold ischemic time of less than 1 hour.  The specimen has a formalin fixation time of 32 hours.  The specimen consists of a right breast measuring 20.5 cm medial to lateral by 20.0 cm superior to inferior by 3.6 cm anterior to posterior.  The breast weighs 1147 grams.  The " attached skin ellipse measures 20.5 cm medial to lateral by 17.1 cm superior to inferior.  The skin surface is unremarkable.  The nipple appears unremarkable.  The deep margin is yellow-pink and intact.  The deep margin is inked black.  Sectioning reveals a jelly filled biopsy cavity posterior and slightly superior to the nipple measuring 0.8 x 0.6 x 0.6 cm.  The biopsy cavity measures 3.3 cm from the deep margin.  Lateral to the biopsy cavity is a slightly stellate yellow to gray mass measuring 0.8 x 0.8 x 0.5 cm.  The mass measures 3.9 cm from the deep margin, 3.5 cm from the skin surface, 3.2 cm posterior/superior to nipple, 8.5 cm from the lateral margin, 8.8 cm from the medial margin, 8.3 cm from the inferior margin, and 8.9 cm from the superior margin.  Dissection through the remaining breast tissue reveals yellow-pink, soft and lobulated fibroadipose tissue with a small amount of fibrocystic tissue and no additional lesions identified.     The nipple is shaved, cross sectioned and submitted in block 3A.  The base of nipple is shaved and submitted in block 3B.  A representative section of unremarkable skin is submitted in block 3C deep margin overlying the biopsy cavity/mass is submitted in block 3D.  The mass and biopsy cavity is totally submitted in blocks 3E through 3H.  Representative section of central breast is submitted in block 3I.  Random upper outer quadrant is submitted in block 3J.  Random lower outer quadrant is submitted in block 3K.  Random upper inner quadrant is submitted in block 3L.  Random lower inner quadrant is submitted in block 3M.              Assessment & Plan       Diagnoses and all orders for this visit:    1. Morbidly obese (HCC) (Primary)    2. Type 2 diabetes mellitus with stage 3 chronic kidney disease, without long-term current use of insulin, unspecified whether stage 3a or 3b CKD (HCC)    3. Malignant neoplasm of upper-outer quadrant of right breast in female, estrogen  receptor positive (HCC)    4. Malignant neoplasm of upper-inner quadrant of right breast in female, estrogen receptor positive (HCC)       Status post excision of lobular carcinoma right breast also in situ left breast presently doing well 4 years out from bilateral mastectomy continue exams, follow-up with me in 1 year sooner if questions or problems arise.    Discussed BMI elevation and need to move toward a reduced BMI for health concerns she appears to understand she is a non smoker and her meds were reviewed.      Michael Hodges MD  07/27/22  09:18 CDT

## 2022-08-02 ENCOUNTER — OFFICE VISIT (OUTPATIENT)
Dept: GASTROENTEROLOGY | Facility: CLINIC | Age: 77
End: 2022-08-02

## 2022-08-02 VITALS
HEIGHT: 61 IN | HEART RATE: 74 BPM | TEMPERATURE: 96.6 F | WEIGHT: 198 LBS | BODY MASS INDEX: 37.38 KG/M2 | OXYGEN SATURATION: 96 % | DIASTOLIC BLOOD PRESSURE: 78 MMHG | SYSTOLIC BLOOD PRESSURE: 132 MMHG

## 2022-08-02 DIAGNOSIS — Z78.9 NONSMOKER: ICD-10-CM

## 2022-08-02 DIAGNOSIS — E11.9 CONTROLLED TYPE 2 DIABETES MELLITUS WITHOUT COMPLICATION, WITHOUT LONG-TERM CURRENT USE OF INSULIN: ICD-10-CM

## 2022-08-02 DIAGNOSIS — I10 HTN (HYPERTENSION), BENIGN: ICD-10-CM

## 2022-08-02 DIAGNOSIS — Z86.010 HX OF ADENOMATOUS COLONIC POLYPS: Primary | ICD-10-CM

## 2022-08-02 PROCEDURE — 99204 OFFICE O/P NEW MOD 45 MIN: CPT | Performed by: CLINICAL NURSE SPECIALIST

## 2022-08-02 NOTE — PROGRESS NOTES
Cee Quiles  1945 8/2/2022  Chief Complaint   Patient presents with   • Colonoscopy     Subjective   HPI  Cee Quiles is a 77 y.o. female who presents as a referral for preventative maintenance. She has no complaints of nausea or vomiting. No change in bowels. No wt loss. No BRBPR. No melena. There is NO family hx for colon cancer. No abdominal pain.  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 breast in female, estrogen receptor positive (HCC) 4/23/2020   • Osteopenia of neck of femur 4/23/2020   • PONV (postoperative nausea and vomiting)    • Torn meniscus      Past Surgical History:   Procedure Laterality Date   • CHOLECYSTECTOMY     • COLONOSCOPY W/ POLYPECTOMY  02/08/2012    Pedunculated polyp cecal pit, Hyperplastic polyp at 15 cm repeat exam in 5 years   • COLONOSCOPY W/ POLYPECTOMY  07/31/2017    Tubular adenoma hepatic flexure repeat exam in 5 years   • KNEE SURGERY Right    • MASTECTOMY W/ SENTINEL NODE BIOPSY Bilateral 02/12/2019    Procedure: BILATERAL MASTECTOMY WITH RIGHT BREAST SENTINEL LYMPH NODE BIOPSY - RADIOLOGIST WILL INJECT;  Surgeon: Michael Hodges MD;  Location: [facility] OR;  Service: General   • OVARIAN CYST DRAINAGE Left    • TUMOR REMOVAL Left      Outpatient Medications Marked as Taking for the 8/2/22 encounter (Office Visit) with Gogo Lozada APRN   Medication Sig Dispense Refill   • amLODIPine (NORVASC) 5 MG tablet      •  atorvastatin (LIPITOR) 10 MG tablet TAKE 1 TABLET BY MOUTH  DAILY 90 tablet 3   • Bystolic 20 MG tablet TAKE 1 TABLET BY MOUTH  DAILY 90 tablet 3   • calcium citrate-vitamin d (CALCITRATE) 315-250 MG-UNIT tablet tablet Take  by mouth Daily.     • Denosumab (PROLIA SC) Inject  under the skin into the appropriate area as directed.     • furosemide (LASIX) 20 MG tablet Take 20 mg by mouth 2 (Two) Times a Day.     • glipizide (GLUCOTROL XL) 2.5 MG 24 hr tablet TAKE 1 TABLET BY MOUTH  DAILY 90 tablet 3   • hydroCHLOROthiazide (HYDRODIURIL) 25 MG tablet TAKE 1 TABLET BY MOUTH  DAILY 90 tablet 3   • letrozole (FEMARA) 2.5 MG tablet TAKE 1 TABLET BY MOUTH  DAILY 90 tablet 3   • levothyroxine (SYNTHROID, LEVOTHROID) 100 MCG tablet TAKE 1 TABLET BY MOUTH  DAILY 90 tablet 3   • meclizine (ANTIVERT) 25 MG tablet Take 25 mg by mouth 3 (Three) Times a Day As Needed for dizziness.       Allergies   Allergen Reactions   • Keflex [Cephalexin] Anaphylaxis   • Lortab [Hydrocodone-Acetaminophen] Nausea Only     Social History     Socioeconomic History   • Marital status:    Tobacco Use   • Smoking status: Never Smoker   • Smokeless tobacco: Never Used   Substance and Sexual Activity   • Alcohol use: No   • Drug use: No   • Sexual activity: Defer     Family History   Problem Relation Age of Onset   • Colon cancer Neg Hx    • Colon polyps Neg Hx      Health Maintenance   Topic Date Due   • TDAP/TD VACCINES (1 - Tdap) Never done   • ZOSTER VACCINE (1 of 2) Never done   • DIABETIC EYE EXAM  10/16/2020   • Pneumococcal Vaccine 65+ (2 - PPSV23 or PCV20) 11/18/2020   • COVID-19 Vaccine (3 - Booster) 08/22/2021   • ANNUAL WELLNESS VISIT  09/18/2021   • DIABETIC FOOT EXAM  02/25/2022   • COLORECTAL CANCER SCREENING  08/14/2022   • HEMOGLOBIN A1C  09/02/2022   • INFLUENZA VACCINE  10/01/2022   • LIPID PANEL  03/02/2023   • URINE MICROALBUMIN  03/02/2023   • DXA SCAN  03/30/2023   • HEPATITIS C SCREENING  Completed   • MAMMOGRAM   "Discontinued       REVIEW OF SYSTEMS  General: well appearing, no fever chills or sweats, no unexplained wt loss  HEENT: no acute visual or hearing disturbances  Cardiovascular: No chest pain or palpitations  Pulmonary: No shortness of breath, coughing, wheezing or hemoptysis  : No burning, urgency, hematuria, or dysuria  Musculoskeletal: No joint pain or stiffness  Peripheral: no edema  Skin: No lesions or rashes  Neuro: No dizziness, headaches, stroke, syncope  Endocrine: No hot or cold intolerances  Hematological: No blood dyscrasias    Objective   Vitals:    08/02/22 0904   BP: 132/78   Pulse: 74   Temp: 96.6 °F (35.9 °C)   SpO2: 96%   Weight: 89.8 kg (198 lb)   Height: 154.9 cm (61\")     Body mass index is 37.41 kg/m².  Class 2 Severe Obesity (BMI >=35 and <=39.9). Obesity-related health conditions include the following: hypertension and diabetes mellitus. Obesity is unchanged. BMI is is above average; BMI management plan is completed. We discussed portion control and increasing exercise.      PHYSICAL EXAM  General: age appropriate well nourished well appearing, no acute distress  Head: normocephalic and atraumatic  Global assessment-supple  Neck-No JVD noted, no lymphadenopathy  Pulmonary-clear to auscultation bilaterally, normal respiratory effort  Cardiovascular-normal rate and rhythm, normal heart sounds, S1 and S2 noted  Abdomen-soft, non tender, non distended, normal bowel sounds all 4 quadrants, no hepatosplenomegaly noted  Extremities-No clubbing cyanosis or edema  Neuro-Non focal, converses appropriately, awake, alert, oriented    Assessment & Plan     Diagnoses and all orders for this visit:    1. Hx of adenomatous colonic polyps (Primary)  -     Case Request; Standing  -     Follow Anesthesia Guidelines / Standing Orders; Future  -     Obtain Informed Consent; Future  -     Case Request  -     polyethylene glycol (GoLYTELY) 236 g solution; Take as directed by office instructions.  Dispense: 4000 " mL; Refill: 0    2. Nonsmoker    3. HTN (hypertension), benign  Comments:  cont BP medication the day of procedure    4. Controlled type 2 diabetes mellitus without complication, without long-term current use of insulin (HCC)  Comments:  Hold medication the day of procedure        COLONOSCOPY WITH ANESTHESIA (N/A)  Body mass index is 37.41 kg/m².    Patient instructions on prep prior to procedure provided to the patient.    All risks, benefits, alternatives, and indications of colonoscopy procedure have been discussed with the patient. Risks to include perforation of the colon requiring possible surgery or colostomy, risk of bleeding from biopsies or removal of colon tissue, possibility of missing a colon polyp or cancer, or adverse drug reaction.  Benefits to include the diagnosis and management of disease of the colon and rectum. Alternatives to include barium enema, radiographic evaluation, lab testing or no intervention. Pt verbalizes understanding and agrees.     Gogo Lozada, APRN  2022  09:32 CDT      IF YOU SMOKE OR USE TOBACCO PLEASE READ THE FOLLOWIN minutes reading provided    Why is smoking bad for me?  Smoking increases the risk of heart disease, lung disease, vascular disease, stroke, and cancer.     If you smoke, STOP!    If you would like more information on quitting smoking, please visit the [employer] website: [email or web address]   This link will provide additional resources including the QUIT line and the Beat the Pack support groups.     For more information:    Quit Now [place]  -QUIT-NOW  [email or web address]

## 2022-08-03 PROBLEM — Z86.0101 HX OF ADENOMATOUS COLONIC POLYPS: Status: ACTIVE | Noted: 2022-08-03

## 2022-08-03 PROBLEM — Z86.010 HX OF ADENOMATOUS COLONIC POLYPS: Status: ACTIVE | Noted: 2022-08-03

## 2022-08-04 ENCOUNTER — LAB (OUTPATIENT)
Dept: LAB | Facility: HOSPITAL | Age: 77
End: 2022-08-04

## 2022-08-04 DIAGNOSIS — Z17.0 MALIGNANT NEOPLASM OF UPPER-INNER QUADRANT OF RIGHT BREAST IN FEMALE, ESTROGEN RECEPTOR POSITIVE: ICD-10-CM

## 2022-08-04 DIAGNOSIS — C50.211 MALIGNANT NEOPLASM OF UPPER-INNER QUADRANT OF RIGHT BREAST IN FEMALE, ESTROGEN RECEPTOR POSITIVE: ICD-10-CM

## 2022-08-04 LAB
ALBUMIN SERPL-MCNC: 4 G/DL (ref 3.5–5.2)
ALBUMIN/GLOB SERPL: 1.3 G/DL
ALP SERPL-CCNC: 75 U/L (ref 39–117)
ALT SERPL W P-5'-P-CCNC: 6 U/L (ref 1–33)
ANION GAP SERPL CALCULATED.3IONS-SCNC: 11 MMOL/L (ref 5–15)
AST SERPL-CCNC: 13 U/L (ref 1–32)
BASOPHILS # BLD AUTO: 0.03 10*3/MM3 (ref 0–0.2)
BASOPHILS NFR BLD AUTO: 0.4 % (ref 0–1.5)
BILIRUB SERPL-MCNC: 0.6 MG/DL (ref 0–1.2)
BUN SERPL-MCNC: 13 MG/DL (ref 8–23)
BUN/CREAT SERPL: 11.2 (ref 7–25)
CALCIUM SPEC-SCNC: 8.9 MG/DL (ref 8.6–10.5)
CEA SERPL-MCNC: 1.13 NG/ML
CHLORIDE SERPL-SCNC: 99 MMOL/L (ref 98–107)
CO2 SERPL-SCNC: 27 MMOL/L (ref 22–29)
CREAT SERPL-MCNC: 1.16 MG/DL (ref 0.57–1)
DEPRECATED RDW RBC AUTO: 41.3 FL (ref 37–54)
EGFRCR SERPLBLD CKD-EPI 2021: 49.3 ML/MIN/1.73
EOSINOPHIL # BLD AUTO: 0.34 10*3/MM3 (ref 0–0.4)
EOSINOPHIL NFR BLD AUTO: 4.2 % (ref 0.3–6.2)
ERYTHROCYTE [DISTWIDTH] IN BLOOD BY AUTOMATED COUNT: 13.6 % (ref 12.3–15.4)
GLOBULIN UR ELPH-MCNC: 3 GM/DL
GLUCOSE SERPL-MCNC: 111 MG/DL (ref 65–99)
HCT VFR BLD AUTO: 38.3 % (ref 34–46.6)
HGB BLD-MCNC: 12.8 G/DL (ref 12–15.9)
IMM GRANULOCYTES # BLD AUTO: 0.04 10*3/MM3 (ref 0–0.05)
IMM GRANULOCYTES NFR BLD AUTO: 0.5 % (ref 0–0.5)
LYMPHOCYTES # BLD AUTO: 1.73 10*3/MM3 (ref 0.7–3.1)
LYMPHOCYTES NFR BLD AUTO: 21.5 % (ref 19.6–45.3)
MCH RBC QN AUTO: 28.1 PG (ref 26.6–33)
MCHC RBC AUTO-ENTMCNC: 33.4 G/DL (ref 31.5–35.7)
MCV RBC AUTO: 84.2 FL (ref 79–97)
MONOCYTES # BLD AUTO: 0.61 10*3/MM3 (ref 0.1–0.9)
MONOCYTES NFR BLD AUTO: 7.6 % (ref 5–12)
NEUTROPHILS NFR BLD AUTO: 5.28 10*3/MM3 (ref 1.7–7)
NEUTROPHILS NFR BLD AUTO: 65.8 % (ref 42.7–76)
NRBC BLD AUTO-RTO: 0 /100 WBC (ref 0–0.2)
PLATELET # BLD AUTO: 250 10*3/MM3 (ref 140–450)
PMV BLD AUTO: 9.4 FL (ref 6–12)
POTASSIUM SERPL-SCNC: 3.5 MMOL/L (ref 3.5–5.2)
PROT SERPL-MCNC: 7 G/DL (ref 6–8.5)
RBC # BLD AUTO: 4.55 10*6/MM3 (ref 3.77–5.28)
SODIUM SERPL-SCNC: 137 MMOL/L (ref 136–145)
WBC NRBC COR # BLD: 8.03 10*3/MM3 (ref 3.4–10.8)

## 2022-08-04 PROCEDURE — 80053 COMPREHEN METABOLIC PANEL: CPT

## 2022-08-04 PROCEDURE — 86300 IMMUNOASSAY TUMOR CA 15-3: CPT

## 2022-08-04 PROCEDURE — 82378 CARCINOEMBRYONIC ANTIGEN: CPT

## 2022-08-04 PROCEDURE — 36415 COLL VENOUS BLD VENIPUNCTURE: CPT

## 2022-08-04 PROCEDURE — 85025 COMPLETE CBC W/AUTO DIFF WBC: CPT

## 2022-08-05 LAB — CANCER AG27-29 SERPL-ACNC: 19 U/ML (ref 0–38.6)

## 2022-08-22 RX ORDER — NEBIVOLOL 20 MG/1
TABLET ORAL
Qty: 90 TABLET | Refills: 3 | Status: SHIPPED | OUTPATIENT
Start: 2022-08-22

## 2022-08-22 RX ORDER — GLIPIZIDE 2.5 MG/1
2.5 TABLET, EXTENDED RELEASE ORAL DAILY
Qty: 90 TABLET | Refills: 3 | Status: SHIPPED | OUTPATIENT
Start: 2022-08-22

## 2022-08-22 RX ORDER — LEVOTHYROXINE SODIUM 0.1 MG/1
100 TABLET ORAL DAILY
Qty: 90 TABLET | Refills: 3 | Status: SHIPPED | OUTPATIENT
Start: 2022-08-22

## 2022-08-22 RX ORDER — HYDROCHLOROTHIAZIDE 25 MG/1
TABLET ORAL
Qty: 90 TABLET | Refills: 3 | Status: SHIPPED | OUTPATIENT
Start: 2022-08-22 | End: 2022-09-01

## 2022-08-29 ENCOUNTER — HOSPITAL ENCOUNTER (OUTPATIENT)
Facility: HOSPITAL | Age: 77
Setting detail: HOSPITAL OUTPATIENT SURGERY
Discharge: HOME | End: 2022-08-29
Attending: INTERNAL MEDICINE | Admitting: INTERNAL MEDICINE

## 2022-08-29 ENCOUNTER — ANESTHESIA EVENT (OUTPATIENT)
Dept: GASTROENTEROLOGY | Facility: HOSPITAL | Age: 77
End: 2022-08-29

## 2022-08-29 ENCOUNTER — ANESTHESIA (OUTPATIENT)
Dept: GASTROENTEROLOGY | Facility: HOSPITAL | Age: 77
End: 2022-08-29

## 2022-08-29 VITALS
WEIGHT: 197 LBS | OXYGEN SATURATION: 97 % | BODY MASS INDEX: 37.19 KG/M2 | TEMPERATURE: 97.1 F | RESPIRATION RATE: 15 BRPM | DIASTOLIC BLOOD PRESSURE: 79 MMHG | HEIGHT: 61 IN | SYSTOLIC BLOOD PRESSURE: 144 MMHG | HEART RATE: 62 BPM

## 2022-08-29 DIAGNOSIS — Z86.010 HX OF ADENOMATOUS COLONIC POLYPS: ICD-10-CM

## 2022-08-29 LAB — GLUCOSE BLDC GLUCOMTR-MCNC: 109 MG/DL (ref 70–130)

## 2022-08-29 PROCEDURE — 82962 GLUCOSE BLOOD TEST: CPT

## 2022-08-29 PROCEDURE — 25010000002 PROPOFOL 10 MG/ML EMULSION: Performed by: NURSE ANESTHETIST, CERTIFIED REGISTERED

## 2022-08-29 PROCEDURE — 88305 TISSUE EXAM BY PATHOLOGIST: CPT | Performed by: INTERNAL MEDICINE

## 2022-08-29 PROCEDURE — 45385 COLONOSCOPY W/LESION REMOVAL: CPT | Performed by: INTERNAL MEDICINE

## 2022-08-29 RX ORDER — SODIUM CHLORIDE 9 MG/ML
500 INJECTION, SOLUTION INTRAVENOUS CONTINUOUS PRN
Status: DISCONTINUED | OUTPATIENT
Start: 2022-08-29 | End: 2022-08-29 | Stop reason: HOSPADM

## 2022-08-29 RX ORDER — PROPOFOL 10 MG/ML
VIAL (ML) INTRAVENOUS AS NEEDED
Status: DISCONTINUED | OUTPATIENT
Start: 2022-08-29 | End: 2022-08-29 | Stop reason: SURG

## 2022-08-29 RX ORDER — SODIUM CHLORIDE 0.9 % (FLUSH) 0.9 %
10 SYRINGE (ML) INJECTION AS NEEDED
Status: DISCONTINUED | OUTPATIENT
Start: 2022-08-29 | End: 2022-08-29 | Stop reason: HOSPADM

## 2022-08-29 RX ORDER — LIDOCAINE HYDROCHLORIDE 20 MG/ML
INJECTION, SOLUTION EPIDURAL; INFILTRATION; INTRACAUDAL; PERINEURAL AS NEEDED
Status: DISCONTINUED | OUTPATIENT
Start: 2022-08-29 | End: 2022-08-29 | Stop reason: SURG

## 2022-08-29 RX ADMIN — LIDOCAINE HYDROCHLORIDE 60 MG: 20 INJECTION, SOLUTION EPIDURAL; INFILTRATION; INTRACAUDAL; PERINEURAL at 08:27

## 2022-08-29 RX ADMIN — PROPOFOL 40 MG: 10 INJECTION, EMULSION INTRAVENOUS at 08:29

## 2022-08-29 RX ADMIN — SODIUM CHLORIDE 500 ML: 9 INJECTION, SOLUTION INTRAVENOUS at 07:59

## 2022-08-29 RX ADMIN — PROPOFOL 50 MG: 10 INJECTION, EMULSION INTRAVENOUS at 08:27

## 2022-08-29 RX ADMIN — PROPOFOL 30 MG: 10 INJECTION, EMULSION INTRAVENOUS at 08:33

## 2022-08-29 RX ADMIN — PROPOFOL 30 MG: 10 INJECTION, EMULSION INTRAVENOUS at 08:36

## 2022-08-29 RX ADMIN — PROPOFOL 20 MG: 10 INJECTION, EMULSION INTRAVENOUS at 08:39

## 2022-08-29 RX ADMIN — PROPOFOL 30 MG: 10 INJECTION, EMULSION INTRAVENOUS at 08:31

## 2022-08-29 NOTE — ANESTHESIA POSTPROCEDURE EVALUATION
Patient: Cee Quiles    Procedure Summary     Date: 08/29/22 Room / Location: [facility] ENDOSCOPY 2 / [facility] END[facility]    Anesthesia Start: 0824 Anesthesia Stop: 0846    Procedure: COLONOSCOPY WITH ANESTHESIA (N/A ) Diagnosis:       Hx of adenomatous colonic polyps      (Hx of adenomatous colonic polyps [Z86.010])    Surgeons: Rico Hilton MD Provider: Dhiraj Diaz CRNA    Anesthesia Type: MAC ASA Status: 3          Anesthesia Type: MAC    Vitals  Vitals Value Taken Time   /79 08/29/22 0906   Temp     Pulse 62 08/29/22 0907   Resp 15 08/29/22 0905   SpO2 97 % 08/29/22 0907   Vitals shown include unvalidated device data.        Post Anesthesia Care and Evaluation    Patient location during evaluation: PHASE II  Patient participation: complete - patient participated  Level of consciousness: awake  Pain score: 0  Pain management: adequate    Airway patency: patent  Anesthetic complications: No anesthetic complications  PONV Status: none  Cardiovascular status: acceptable and stable  Respiratory status: acceptable and room air  Hydration status: acceptable

## 2022-08-29 NOTE — ANESTHESIA PREPROCEDURE EVALUATION
Anesthesia Evaluation     Patient summary reviewed   no history of anesthetic complications:  NPO Solid Status: > 8 hours             Airway   Mallampati: II  Dental    (+) upper dentures    Pulmonary - negative pulmonary ROS   Cardiovascular   Exercise tolerance: good (4-7 METS)    (+) hypertension, hyperlipidemia,       Neuro/Psych- negative ROS  GI/Hepatic/Renal/Endo    (+) obesity,   renal disease CRI, diabetes mellitus, thyroid problem hypothyroidism    Musculoskeletal     Abdominal    Substance History      OB/GYN          Other                        Anesthesia Plan    ASA 3     MAC       Anesthetic plan, risks, benefits, and alternatives have been provided, discussed and informed consent has been obtained with: patient.        CODE STATUS:

## 2022-08-31 LAB
CYTO UR: NORMAL
LAB AP CASE REPORT: NORMAL
Lab: NORMAL
PATH REPORT.FINAL DX SPEC: NORMAL
PATH REPORT.GROSS SPEC: NORMAL

## 2022-09-01 ENCOUNTER — OFFICE VISIT (OUTPATIENT)
Dept: ONCOLOGY | Facility: CLINIC | Age: 77
End: 2022-09-01

## 2022-09-01 VITALS
TEMPERATURE: 97.9 F | HEART RATE: 72 BPM | BODY MASS INDEX: 37.66 KG/M2 | HEIGHT: 61 IN | OXYGEN SATURATION: 97 % | RESPIRATION RATE: 16 BRPM | SYSTOLIC BLOOD PRESSURE: 146 MMHG | DIASTOLIC BLOOD PRESSURE: 86 MMHG | WEIGHT: 199.5 LBS

## 2022-09-01 DIAGNOSIS — N18.31 ANEMIA IN STAGE 3A CHRONIC KIDNEY DISEASE: ICD-10-CM

## 2022-09-01 DIAGNOSIS — M85.851 OSTEOPENIA OF NECK OF RIGHT FEMUR: Primary | ICD-10-CM

## 2022-09-01 DIAGNOSIS — D63.1 ANEMIA IN STAGE 3A CHRONIC KIDNEY DISEASE: ICD-10-CM

## 2022-09-01 DIAGNOSIS — Z79.811 LONG TERM (CURRENT) USE OF AROMATASE INHIBITORS: ICD-10-CM

## 2022-09-01 DIAGNOSIS — Z85.3 HISTORY OF BREAST CANCER: ICD-10-CM

## 2022-09-01 PROCEDURE — 99214 OFFICE O/P EST MOD 30 MIN: CPT | Performed by: NURSE PRACTITIONER

## 2022-09-01 NOTE — PROGRESS NOTES
[facility]  [address]  [facility] [address] [phone]    Patient Name: Cee Quiles  Encounter Date: 09/01/2022  YOB: 1945  Patient Number: [id number]      REASON FOR FOLLOW-UP: Cee Quiles is a pleasant 77-year-old  female who is seen on followup for Stage IA right breast cancer, receptor positive, and HER-2/jeremy negative.  Low recurrence score of 6, absolute benefit of chemo is less than 1%.   She is on adjuvant letrozole.  She is also seen for anemia from iron deficiency and chronic kidney disease.  She is seen alone.  She is a reliable historian.    HPI:  Pt presents to clinic today for continued follow up.  She has no acute complaints today.      Patient had labs on August 4.  Labs were reviewed.  Chemistry with nonfasting glucose 111, creatinine 1.16, BUN 13, GFR 49.3.  CBC with WBC 8.03, hemoglobin 12.8, hematocrit 38.3, platelet count 250.  CA 27-29 is 19.0 and CEA is 1.13.       Pt had colonoscopy on 08/29/22.  Biopsy with benign colonic mucosa.  Histologic changes of an adenomatous polyp or hyperplastic polyp are not identified.  Adenomatous polyp, tubular type.  No high-grade dysplasia identified.        Oncology/Hematology History Overview Note   DIAGNOSTIC ABNORMALITIES:  The patient was found to have a right breast mass by mammogram at [facility].   Right breast core biopsy 01/17/2019 showed infiltrating lobular carcinoma, grade 1. Greatest dimension of tumor measured 11.1 mm. ER 98%, FL 92%, HER-2/jeremy +1 and negative FISH, and Ki-67 at 7%,. Oncotype DX report. Low recurrence score of 6, absolute benefit of chemo is less than 1%  Breast MRI [facility] 01/30/2019, 1.8 x 0.7 x 0.6 cm, non mass-like linear enhancement at 1 o'clock. Second suspicious area of enhancement in the right breast just behind the nipple measuring 1 x 1 x 0.9 cm. Two areas of  linear non mass-like enhancement has anterior depth at 1 o'clock in the left breast.   The patient was seen by Dr. Hodges 02/01/2019. Planned for bilateral mastectomy and sentinel lymph node evaluation.   Ultrasound biopsy left breast showed atypical lobular hyperplasia. No histologic evidence of malignancy.   The patient had a followup with Dr. Hodges 02/13/2019.  CBC 02/13/2019 revealed a WBC of 10.6, hemoglobin 12.2, hematocrit 36.8, MCV 86.4, platelet count 211,000 with ANC of 9.4. CMP remarkable for a GFR of 46 mL/minute.  Pathology report 02/15/2019 left breast showed no evidence of in situ or invasive carcinoma. Right breast showed an infiltrating lobular carcinoma, grade 1 with residual 8 mm.       PREVIOUS INTERVENTIONS:   Bilateral mastectomy with right breast sentinel lymph node biopsy on 02/12/2019.  Adjuvant Femara 2.5 mg p.o. daily 03/22/2019 through present.      PREVIOUS INTERVENTIONS:  Ferrous sulfate 325 mg daily 04/22/2020 through 05/20/2020.  Resume 07/17/2020 through 08/20/2020.     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 breast in female, estrogen receptor positive (CMS/HCC)     4/24/2020 -  Chemotherapy    OP SUPPORTIVE Denosumab (Prolia) Q6M         PAST MEDICAL HISTORY:  ALLERGIES:  Allergies   Allergen Reactions   • Keflex [Cephalexin] Anaphylaxis   • Lortab [Hydrocodone-Acetaminophen] Nausea Only     CURRENT MEDICATIONS:  Outpatient Encounter Medications as of 9/1/2022   Medication Sig Dispense Refill   • amLODIPine (NORVASC) 5 MG tablet      • calcium citrate-vitamin d (CALCITRATE) 315-250 MG-UNIT tablet tablet Take  by mouth Daily.     • Denosumab (PROLIA SC) Inject  under the skin into the appropriate area as directed.     • furosemide (LASIX) 20 MG tablet Take 20 mg  by mouth 2 (Two) Times a Day.     • glipizide (GLUCOTROL XL) 2.5 MG 24 hr tablet TAKE 1 TABLET BY MOUTH  DAILY 90 tablet 3   • letrozole (FEMARA) 2.5 MG tablet TAKE 1 TABLET BY MOUTH  DAILY 90 tablet 3   • levothyroxine (SYNTHROID, LEVOTHROID) 100 MCG tablet TAKE 1 TABLET BY MOUTH  DAILY 90 tablet 3   • meclizine (ANTIVERT) 25 MG tablet Take 25 mg by mouth 3 (Three) Times a Day As Needed for dizziness.     • nebivolol (BYSTOLIC) 20 MG tablet TAKE 1 TABLET BY MOUTH  DAILY 90 tablet 3   • [DISCONTINUED] atorvastatin (LIPITOR) 10 MG tablet TAKE 1 TABLET BY MOUTH  DAILY 90 tablet 3   • [DISCONTINUED] hydroCHLOROthiazide (HYDRODIURIL) 25 MG tablet TAKE 1 TABLET BY MOUTH  DAILY 90 tablet 3   • [DISCONTINUED] polyethylene glycol (GoLYTELY) 236 g solution Take as directed by office instructions. 4000 mL 0     No facility-administered encounter medications on file as of 9/1/2022.     ADULT ILLNESSES:  Patient Active Problem List   Diagnosis Code   • Type 2 diabetes mellitus with diabetic chronic kidney disease ([facility]) E11.22   • Essential hypertension I10   • Acquired hypothyroidism E03.9   • Chronic pain of right knee M25.561, G89.29   • CKD (chronic kidney disease) stage 3, GFR 30-59 ml/min ([facility]) N18.30   • Mixed hyperlipidemia E78.2   • Morbidly obese ([facility]) E66.01   • Breast CA ([facility]) C50.919   • Postmenopausal Z78.0   • Hypomagnesemia E83.42   • Osteopenia of neck of femur M85.859   • Malignant neoplasm of upper-inner quadrant of right breast in female, estrogen receptor positive ([facility]) C50.211, Z17.0   • Long term (current) use of aromatase inhibitors Z79.811   • Hx of adenomatous colonic polyps Z86.010     SURGERIES:  Past Surgical History:   Procedure Laterality Date   • CHOLECYSTECTOMY     • COLONOSCOPY N/A 8/29/2022    Procedure: COLONOSCOPY WITH ANESTHESIA;  Surgeon: Rico Hilton MD;  Location: [facility] ENDOSCOPY;  Service: Gastroenterology;  Laterality: N/A;  pre hx colon polyp  post polyp  dr rusty lira   •  COLONOSCOPY W/ POLYPECTOMY  02/08/2012    Pedunculated polyp cecal pit, Hyperplastic polyp at 15 cm repeat exam in 5 years   • COLONOSCOPY W/ POLYPECTOMY  07/31/2017    Tubular adenoma hepatic flexure repeat exam in 5 years   • KNEE SURGERY Right    • MASTECTOMY W/ SENTINEL NODE BIOPSY Bilateral 02/12/2019    Procedure: BILATERAL MASTECTOMY WITH RIGHT BREAST SENTINEL LYMPH NODE BIOPSY - RADIOLOGIST WILL INJECT;  Surgeon: Michael Hodges MD;  Location: [facility] OR;  Service: General   • OVARIAN CYST DRAINAGE Left    • TUMOR REMOVAL Left      HEALTH MAINTENANCE ITEMS:  Health Maintenance Due   Topic Date Due   • TDAP/TD VACCINES (1 - Tdap) Never done   • ZOSTER VACCINE (1 of 2) Never done   • DIABETIC EYE EXAM  10/16/2020   • Pneumococcal Vaccine 65+ (2 - PPSV23 or PCV20) 11/18/2020   • COVID-19 Vaccine (3 - Booster) 08/22/2021   • ANNUAL WELLNESS VISIT  09/18/2021   • DIABETIC FOOT EXAM  02/25/2022       <no information>  Last Completed Colonoscopy          COLORECTAL CANCER SCREENING (COLONOSCOPY - Every 5 Years) Next due on 8/29/2027 08/29/2022  COLONOSCOPY    08/29/2022  Surgical Procedure: COLONOSCOPY    04/24/2020  Occult Blood X 3, Stool - Stool, Per Rectum    08/14/2017  SCANNED - COLONOSCOPY    02/08/2012  SCANNED - COLONOSCOPY    Only the first 5 history entries have been loaded, but more history exists.              Immunization History   Administered Date(s) Administered   • COVID-19 (MODERNA) 1st, 2nd, 3rd Dose Only 02/23/2021, 03/22/2021   • FLUAD TRI 65YR+ 11/18/2019   • Flu Vaccine Quad PF >36MO 11/13/2017   • Fluad Quad 65+ 11/18/2019   • Fluzone High Dose =>65 Years ([employer] ONLY) 10/23/2015, 10/31/2016, 10/31/2018   • Fluzone Quad >6mos (Multi-dose) 11/13/2017   • Influenza Quad Vaccine (Inpatient) 11/13/2017   • Pneumococcal Conjugate 13-Valent (PCV13) 11/18/2019     Last Completed Mammogram          Discontinued - MAMMOGRAM  Discontinued    01/30/2019  MRI Breast Bilateral With & Without  "Contrast    12/21/2018  Mammo diagnostic digital tomosynthesis right w CAD    12/17/2018  Mammo Screening Bilateral With CAD    12/16/2016  SCANNED - MAMMO    12/05/2014  Outside Claim: INACTIVE -[facility] MAMMOGRAM SCREENING BILAT DIGITAL W CAD    Only the first 5 history entries have been loaded, but more history exists.                  FAMILY HISTORY:  Family History   Problem Relation Age of Onset   • Colon cancer Neg Hx    • Colon polyps Neg Hx      SOCIAL HISTORY:  Social History     Socioeconomic History   • Marital status:    Tobacco Use   • Smoking status: Never Smoker   • Smokeless tobacco: Never Used   Substance and Sexual Activity   • Alcohol use: No   • Drug use: No   • Sexual activity: Defer       REVIEW OF SYSTEMS:    Review of Systems   Constitutional: Negative for chills, fatigue and fever.        \"I feel pretty good.\"   HENT: Negative for congestion, mouth sores and trouble swallowing.    Eyes: Negative for redness and visual disturbance.   Respiratory: Negative for cough and shortness of breath.    Cardiovascular: Negative for chest pain and palpitations.   Gastrointestinal: Negative for abdominal pain, nausea and vomiting.   Endocrine: Negative for polydipsia and polyphagia.   Genitourinary: Negative for difficulty urinating, dysuria and flank pain.   Musculoskeletal: Negative for gait problem and joint swelling.   Skin: Negative for pallor.   Allergic/Immunologic: Negative for food allergies.   Neurological: Negative for dizziness, speech difficulty and weakness.   Hematological: Negative for adenopathy. Does not bruise/bleed easily.   Psychiatric/Behavioral: Negative for agitation, confusion and hallucinations.       VITAL SIGNS: /86   Pulse 72   Temp 97.9 °F (36.6 °C) (Temporal)   Resp 16   Ht 154.9 cm (61\")   Wt 90.5 kg (199 lb 8 oz)   SpO2 97%   Breastfeeding No   BMI 37.70 kg/m²   Pain Score    09/01/22 0921   PainSc: 0-No pain       PHYSICAL EXAMINATION:     Physical " Exam  Vitals reviewed.   Constitutional:       General: She is not in acute distress.  HENT:      Head: Normocephalic and atraumatic.   Eyes:      General: No scleral icterus.  Cardiovascular:      Rate and Rhythm: Normal rate.   Pulmonary:      Effort: No respiratory distress.      Breath sounds: No wheezing or rales.   Chest:   Breasts:      Right: No axillary adenopathy.      Left: No axillary adenopathy.        Comments: Bilateral mastectomy scars.   No palpable evidence of reoccurrence    Abdominal:      General: Bowel sounds are normal.      Palpations: Abdomen is soft.   Musculoskeletal:      Cervical back: Neck supple.      Right lower leg: Edema present.      Left lower leg: Edema present.   Lymphadenopathy:      Upper Body:      Right upper body: No axillary adenopathy.      Left upper body: No axillary adenopathy.   Skin:     General: Skin is warm and dry.      Coloration: Skin is not pale.   Neurological:      Mental Status: She is alert and oriented to person, place, and time.   Psychiatric:         Mood and Affect: Mood normal.         Behavior: Behavior normal.         LABS    Lab Results - Last 18 Months   Lab Units 08/04/22  0934 04/06/22  1324 03/02/22  1256 12/08/21  1043 08/10/21  1013 04/08/21  1115   HEMOGLOBIN g/dL 12.8 13.3 13.1 12.9 13.3 12.1   HEMATOCRIT % 38.3 38.6 39.5 39.1 38.6 34.3   MCV fL 84.2 83.4 83.3 82.0 81.6 81.9   WBC 10*3/mm3 8.03 7.47 7.71 7.12 8.24 6.67   RDW % 13.6 13.7 14.3 13.9 13.1 13.2   MPV fL 9.4 9.2  --  9.2 9.1 9.2   PLATELETS 10*3/mm3 250 267 273 262 269 240   IMM GRAN % % 0.5 0.5  --  0.8* 0.7* 0.4   NEUTROS ABS 10*3/mm3 5.28 4.77 5.04 4.63 5.70 4.32   LYMPHS ABS 10*3/mm3 1.73 1.80 1.82 1.60 1.74 1.51   MONOS ABS 10*3/mm3 0.61 0.52 0.59 0.49 0.52 0.43   EOS ABS 10*3/mm3 0.34 0.31 0.20 0.31 0.19 0.34   BASOS ABS 10*3/mm3 0.03 0.03 0.04 0.03 0.03 0.04   IMMATURE GRANS (ABS) 10*3/mm3 0.04 0.04  --  0.06* 0.06* 0.03   NRBC /100 WBC 0.0 0.0 0.0 0.0 0.0 0.0       Lab  Results - Last 18 Months   Lab Units 08/04/22  0934 04/29/22  1411 04/06/22  1324 03/02/22  1256 12/08/21  1043 10/29/21  1346 08/26/21  0830 08/13/21  0809 08/13/21  0809 08/10/21  1013 04/08/21  1115   GLUCOSE mg/dL 111* 113* 117* 78 132* 129* 114*   < > 115* 122* 107*   SODIUM mmol/L 137 141 138 138 135* 138 139   < > 138 139 138   POTASSIUM mmol/L 3.5 3.2* 3.3* 3.8 3.2* 3.3* 3.7   < > 3.4* 3.3* 3.7   TOTAL CO2 mmol/L  --   --   --  26.0  --   --  27.2   < > 26.3  --   --    CO2 mmol/L 27.0 27.0 30.0*  --  25.0 27.0  --   --   --  26.0 27.0   CHLORIDE mmol/L 99 99 97* 97* 98 98 99   < > 101 100 100   ANION GAP mmol/L 11.0 15.0 11.0  --  12.0 13.0  --   --   --  13.0 11.0   CREATININE mg/dL 1.16* 1.53* 1.24* 1.37* 1.19* 1.20* 1.13*   < > 1.14* 1.02* 1.35*   BUN mg/dL 13 23 22 20 18 24* 16   < > 23 18 27*   BUN / CREAT RATIO  11.2 15.0 17.7 14.6 15.1 20.0 14.2   < > 20.2 17.6 20.0   CALCIUM mg/dL 8.9 10.3 10.4 10.3 9.4 10.3 9.7   < > 10.3 9.8 9.6   EGFR IF NONAFRICN AM mL/min/1.73  --   --   --   --  44* 44* 47*  --  47* 53* 38*   ALK PHOS U/L 75 77 85 79 85 86 71  --   --  75 66   TOTAL PROTEIN g/dL 7.0 7.0 7.0  --  7.7 7.0  --   --   --  7.5 7.2   ALT (SGPT) U/L 6 13 11 15 8 12 10  --   --  12 9   AST (SGOT) U/L 13 16 15 17 15 15 18  --   --  16 17   BILIRUBIN mg/dL 0.6 0.8 0.8 0.8 0.7 0.6 0.8  --   --  0.8 0.6   ALBUMIN g/dL 4.00 4.00 4.30 4.20 4.00 4.10 4.40  --   --  4.50 3.90   GLOBULIN gm/dL 3.0 3.0 2.7  --  3.7 2.9  --   --   --  3.0 3.3    < > = values in this interval not displayed.       Lab Results - Last 18 Months   Lab Units 08/04/22  0934 04/06/22  1324 12/08/21  1043 08/10/21  1013 04/08/21  1115   CEA ng/mL 1.13 1.04 1.50 1.39 1.55       Lab Results - Last 18 Months   Lab Units 03/02/22  1256 08/26/21  0830   TSH uIU/mL 3.220 2.580       Cee Quiles reports a pain score of 0.  No intervention indicated.       ASSESSMENT:  1. Osteopenia of neck of right femur    2. Long term (current) use of  aromatase inhibitors    3. Anemia in stage 3a chronic kidney disease (HCC)    4. History of breast cancer        1.   History of Carcinoma of the right breast, infiltrating lobular.    - Low recurrence score of 6, absolute benefit of chemo is less than 1%:  Current Stage: AJCC Stage IA (T1c N0, M0, G1)   Tumor size 1.91 cm.  Hormone Status: ER 98%, MD 92%, HER-2/jeremy negative by FISH.  Baseline Node Status: 0/2 positive.  Treatment status: Post bilateral mastectomy and right sentinel node biopsy.  On adjuvant Femara 2.5 mg 03/22/2019 through present.  Plan for 10 years.   CA 27-29 is 19.0 and CEA is 1.13.    -Exam today unremarkable for palpable masses or evidence of reoccurance  -Heme status stable     2.  Anemia in chronic kidney disease   Creatinine 1.16, BUN 13, GFR 49.3.    Hemoglobin 12.8, Hematocrit 38.3   See [facility]    3.   Osteopenia   4. Long term use of AI  -On letrozole. No s/s of toxicity.  Monitor for hot flashes,osteonecrosis  -On denosumab (Prolia) q 6 months.   -Last prolia April 29, 2022. Will schedule for October               PLAN:  Stable for observation  Schedule denosumab 60 mg subcutaneously every 6 months, bone density improved and still on Femara.  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  Next bone density 03/2023 to follow osteopenia.  Continue ongoing management per primary care physician and other specialists.  Return to office in 6 months with pre-office CBC with differential, CEA, CA27-29, and CMP  Plan of care discussed with patient.  Understanding expressed.  Patient agreeable to proceed.      Advance Care Planning   ACP discussion was declined by the patient. Patient does not have an advance directive, information provided.    BRAVO Gan  09/01/2022

## 2022-09-08 ENCOUNTER — OFFICE VISIT (OUTPATIENT)
Dept: FAMILY MEDICINE CLINIC | Facility: CLINIC | Age: 77
End: 2022-09-08

## 2022-09-08 VITALS
BODY MASS INDEX: 38.14 KG/M2 | WEIGHT: 202 LBS | OXYGEN SATURATION: 94 % | HEART RATE: 69 BPM | HEIGHT: 61 IN | DIASTOLIC BLOOD PRESSURE: 76 MMHG | SYSTOLIC BLOOD PRESSURE: 142 MMHG

## 2022-09-08 DIAGNOSIS — N18.30 STAGE 3 CHRONIC KIDNEY DISEASE, UNSPECIFIED WHETHER STAGE 3A OR 3B CKD: ICD-10-CM

## 2022-09-08 DIAGNOSIS — N18.30 TYPE 2 DIABETES MELLITUS WITH STAGE 3 CHRONIC KIDNEY DISEASE, WITHOUT LONG-TERM CURRENT USE OF INSULIN, UNSPECIFIED WHETHER STAGE 3A OR 3B CKD: ICD-10-CM

## 2022-09-08 DIAGNOSIS — I10 ESSENTIAL HYPERTENSION: Primary | ICD-10-CM

## 2022-09-08 DIAGNOSIS — E11.22 TYPE 2 DIABETES MELLITUS WITH STAGE 3 CHRONIC KIDNEY DISEASE, WITHOUT LONG-TERM CURRENT USE OF INSULIN, UNSPECIFIED WHETHER STAGE 3A OR 3B CKD: ICD-10-CM

## 2022-09-08 PROCEDURE — G0439 PPPS, SUBSEQ VISIT: HCPCS | Performed by: FAMILY MEDICINE

## 2022-09-08 PROCEDURE — 1170F FXNL STATUS ASSESSED: CPT | Performed by: FAMILY MEDICINE

## 2022-09-08 PROCEDURE — 1160F RVW MEDS BY RX/DR IN RCRD: CPT | Performed by: FAMILY MEDICINE

## 2022-09-08 RX ORDER — ATORVASTATIN CALCIUM 10 MG/1
10 TABLET, FILM COATED ORAL DAILY
COMMUNITY
End: 2022-09-09 | Stop reason: DRUGHIGH

## 2022-09-08 NOTE — PROGRESS NOTES
Subjective   Cee Quiles is a 77 y.o. female.     Chief Complaint   Patient presents with   • Hyperlipidemia   • Hypertension   • Diabetes   • Hypothyroidism   • Chronic Kidney Disease   • Medicare Wellness-subsequent        History of Present Illness     she feels she is doing well with dm and htn--she also has ckd and is followed by nephrollgy      Current Outpatient Medications:   •  amLODIPine (NORVASC) 5 MG tablet, , Disp: , Rfl:   •  calcium citrate-vitamin d (CALCITRATE) 315-250 MG-UNIT tablet tablet, Take  by mouth Daily., Disp: , Rfl:   •  Denosumab (PROLIA SC), Inject  under the skin into the appropriate area as directed., Disp: , Rfl:   •  furosemide (LASIX) 20 MG tablet, Take 20 mg by mouth 2 (Two) Times a Day., Disp: , Rfl:   •  glipizide (GLUCOTROL XL) 2.5 MG 24 hr tablet, TAKE 1 TABLET BY MOUTH  DAILY, Disp: 90 tablet, Rfl: 3  •  letrozole (FEMARA) 2.5 MG tablet, TAKE 1 TABLET BY MOUTH  DAILY, Disp: 90 tablet, Rfl: 3  •  levothyroxine (SYNTHROID, LEVOTHROID) 100 MCG tablet, TAKE 1 TABLET BY MOUTH  DAILY, Disp: 90 tablet, Rfl: 3  •  meclizine (ANTIVERT) 25 MG tablet, Take 25 mg by mouth 3 (Three) Times a Day As Needed for dizziness., Disp: , Rfl:   •  nebivolol (BYSTOLIC) 20 MG tablet, TAKE 1 TABLET BY MOUTH  DAILY, Disp: 90 tablet, Rfl: 3  Allergies   Allergen Reactions   • Keflex [Cephalexin] Anaphylaxis   • Lortab [Hydrocodone-Acetaminophen] Nausea Only       Class 2 Severe Obesity (BMI >=35 and <=39.9). Obesity-related health conditions include the following: hypertension and diabetes mellitus. Obesity is unchanged. BMI is is above average; BMI management plan is completed. We discussed portion control and increasing exercise.      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, left     WRIST   • Cancer (HCC)     LOBULAR RIGHT BREAST   • Chronic knee pain     BILATERAL   • Colon polyp    • Cyst of ovary, left    • Diabetes (HCC)    •  "Diabetes mellitus (HCC)    • Dizziness    • Elevated cholesterol    • Gallstones    • Hx of colonic polyps    • Hyperlipidemia    • Hypertension    • Hypertension    • Hypothyroid    • Kidney disease    • Long term (current) use of aromatase inhibitors 4/23/2020   • Malignant neoplasm of upper-inner quadrant of right breast in female, estrogen receptor positive (HCC) 4/23/2020   • Osteopenia of neck of femur 4/23/2020   • PONV (postoperative nausea and vomiting)    • Torn meniscus      Past Surgical History:   Procedure Laterality Date   • CHOLECYSTECTOMY     • COLONOSCOPY N/A 8/29/2022    Procedure: COLONOSCOPY WITH ANESTHESIA;  Surgeon: Rico Hilton MD;  Location: [facility] ENDOSCOPY;  Service: Gastroenterology;  Laterality: N/A;  pre hx colon polyp  post polyp  dr rusty lira   • COLONOSCOPY W/ POLYPECTOMY  02/08/2012    Pedunculated polyp cecal pit, Hyperplastic polyp at 15 cm repeat exam in 5 years   • COLONOSCOPY W/ POLYPECTOMY  07/31/2017    Tubular adenoma hepatic flexure repeat exam in 5 years   • KNEE SURGERY Right    • MASTECTOMY W/ SENTINEL NODE BIOPSY Bilateral 02/12/2019    Procedure: BILATERAL MASTECTOMY WITH RIGHT BREAST SENTINEL LYMPH NODE BIOPSY - RADIOLOGIST WILL INJECT;  Surgeon: Michael Hodges MD;  Location: [facility] OR;  Service: General   • OVARIAN CYST DRAINAGE Left    • TUMOR REMOVAL Left        Review of Systems   Constitutional: Negative.    HENT: Negative.    Eyes: Negative.    Respiratory: Negative.    Cardiovascular: Negative.    Gastrointestinal: Negative.    Endocrine: Negative.    Genitourinary: Negative.    Musculoskeletal: Negative.    Skin: Negative.    Allergic/Immunologic: Negative.    Neurological: Negative.    Hematological: Negative.    Psychiatric/Behavioral: Negative.        Objective  /76   Pulse 69   Ht 154.9 cm (61\")   Wt 91.6 kg (202 lb)   SpO2 94%   BMI 38.17 kg/m²   Physical Exam  Vitals and nursing note reviewed.   Constitutional:       Appearance: " Normal appearance. She is normal weight.   HENT:      Head: Normocephalic and atraumatic.      Right Ear: Ear canal normal.      Left Ear: Ear canal normal.      Nose: Nose normal.      Mouth/Throat:      Mouth: Mucous membranes are moist.   Eyes:      Extraocular Movements: Extraocular movements intact.      Conjunctiva/sclera: Conjunctivae normal.      Pupils: Pupils are equal, round, and reactive to light.   Cardiovascular:      Rate and Rhythm: Normal rate and regular rhythm.      Pulses: Normal pulses.      Heart sounds: Normal heart sounds.   Pulmonary:      Effort: Pulmonary effort is normal.      Breath sounds: Normal breath sounds.   Abdominal:      General: Abdomen is flat. Bowel sounds are normal.      Palpations: Abdomen is soft.   Musculoskeletal:         General: Normal range of motion.      Cervical back: Normal range of motion and neck supple.   Skin:     General: Skin is warm and dry.      Capillary Refill: Capillary refill takes less than 2 seconds.   Neurological:      General: No focal deficit present.      Mental Status: She is alert and oriented to person, place, and time. Mental status is at baseline.   Psychiatric:         Mood and Affect: Mood normal.     Diabetic foot exam:   Left: Filament test present   Pulses Dorsalis Pedis:  present   Reflexes 3+    Vibratory sensation normal   Proprioception normal   Sharp/dull discrimination normal       Right: Filament test present   Pulses Dorsalis Pedis:  present   Reflexes 3+    Vibratory sensation normal   Proprioception normal   Sharp/dull discrimination normal      Assessment & Plan   Diagnoses and all orders for this visit:    1. Essential hypertension (Primary)  -     Comprehensive metabolic panel  -     Hemoglobin A1c  -     Lipid Panel With / Chol / HDL Ratio    2. Type 2 diabetes mellitus with stage 3 chronic kidney disease, without long-term current use of insulin, unspecified whether stage 3a or 3b CKD (HCC)  -     Comprehensive  metabolic panel  -     Hemoglobin A1c  -     Lipid Panel With / Chol / HDL Ratio    3. Stage 3 chronic kidney disease, unspecified whether stage 3a or 3b CKD (HCC)                 Orders Placed This Encounter   Procedures   • Comprehensive metabolic panel     Order Specific Question:   Release to patient     Answer:   Routine Release   • Hemoglobin A1c     Order Specific Question:   Release to patient     Answer:   Routine Release   • Lipid Panel With / Chol / HDL Ratio     Order Specific Question:   Release to patient     Answer:   Routine Release       Follow up: 6 mos

## 2022-09-08 NOTE — PROGRESS NOTES
The ABCs of the Annual Wellness Visit  Subsequent Medicare Wellness Visit    Chief Complaint   Patient presents with   • Hyperlipidemia   • Hypertension   • Diabetes   • Hypothyroidism   • Chronic Kidney Disease   • Medicare Wellness-subsequent      Subjective    History of Present Illness:  Cee Quiles is a 77 y.o. female who presents for a Subsequent Medicare Wellness Visit.    The following portions of the patient's history were reviewed and   updated as appropriate: allergies, current medications, past family history, past medical history, past social history, past surgical history and problem list.    Compared to one year ago, the patient feels her physical   health is the same.    Compared to one year ago, the patient feels her mental   health is the same.    Recent Hospitalizations:  She was not admitted to the hospital during the last year.       Current Medical Providers:  Patient Care Team:  Neville Sandoval MD as PCP - Andrea Johnson MD as Consulting Physician (Hematology and Oncology)  Michael Hodges MD as Surgeon (General Surgery)    Outpatient Medications Prior to Visit   Medication Sig Dispense Refill   • amLODIPine (NORVASC) 5 MG tablet      • calcium citrate-vitamin d (CALCITRATE) 315-250 MG-UNIT tablet tablet Take  by mouth Daily.     • Denosumab (PROLIA SC) Inject  under the skin into the appropriate area as directed.     • furosemide (LASIX) 20 MG tablet Take 20 mg by mouth 2 (Two) Times a Day.     • glipizide (GLUCOTROL XL) 2.5 MG 24 hr tablet TAKE 1 TABLET BY MOUTH  DAILY 90 tablet 3   • letrozole (FEMARA) 2.5 MG tablet TAKE 1 TABLET BY MOUTH  DAILY 90 tablet 3   • levothyroxine (SYNTHROID, LEVOTHROID) 100 MCG tablet TAKE 1 TABLET BY MOUTH  DAILY 90 tablet 3   • meclizine (ANTIVERT) 25 MG tablet Take 25 mg by mouth 3 (Three) Times a Day As Needed for dizziness.     • nebivolol (BYSTOLIC) 20 MG tablet TAKE 1 TABLET BY MOUTH  DAILY 90 tablet 3     No facility-administered  "medications prior to visit.       No opioid medication identified on active medication list. I have reviewed chart for other potential  high risk medication/s and harmful drug interactions in the elderly.          Aspirin is not on active medication list.  Aspirin use is indicated based on review of current medical condition/s. Pros and cons of this therapy have been discussed with this patient. Benefits of this medication outweigh potential harm.  Patient has been instructed to start taking this medication..    Patient Active Problem List   Diagnosis   • Type 2 diabetes mellitus with diabetic chronic kidney disease (HCC)   • Essential hypertension   • Acquired hypothyroidism   • Chronic pain of right knee   • CKD (chronic kidney disease) stage 3, GFR 30-59 ml/min (HCC)   • Mixed hyperlipidemia   • Morbidly obese (HCC)   • Breast CA (HCC)   • Postmenopausal   • Hypomagnesemia   • Osteopenia of neck of femur   • Malignant neoplasm of upper-inner quadrant of right breast in female, estrogen receptor positive (HCC)   • Long term (current) use of aromatase inhibitors   • Hx of adenomatous colonic polyps     Advance Care Planning  Advance Directive is not on file.  ACP discussion was held with the patient during this visit. Patient does not have an advance directive, information provided.          Objective    Vitals:    09/08/22 0833   BP: 142/76   Pulse: 69   SpO2: 94%   Weight: 91.6 kg (202 lb)   Height: 154.9 cm (61\")     Estimated body mass index is 38.17 kg/m² as calculated from the following:    Height as of this encounter: 154.9 cm (61\").    Weight as of this encounter: 91.6 kg (202 lb).    Class 2 Severe Obesity (BMI >=35 and <=39.9). Obesity-related health conditions include the following: hypertension, diabetes mellitus and dyslipidemias. Obesity is unchanged. BMI is is above average; BMI management plan is completed. We discussed portion control and increasing exercise.      Does the patient have evidence of " cognitive impairment? No    Physical Exam            HEALTH RISK ASSESSMENT    Smoking Status:  Social History     Tobacco Use   Smoking Status Never Smoker   Smokeless Tobacco Never Used     Alcohol Consumption:  Social History     Substance and Sexual Activity   Alcohol Use No     Fall Risk Screen:    CARMELADI Fall Risk Assessment was completed, and patient is at MODERATE risk for falls. Assessment completed on:9/8/2022    Depression Screening:  PHQ-2/PHQ-9 Depression Screening 9/8/2022   Retired PHQ-9 Total Score -   Retired Total Score -   Little Interest or Pleasure in Doing Things 0-->not at all   Feeling Down, Depressed or Hopeless 0-->not at all   PHQ-9: Brief Depression Severity Measure Score 0       Health Habits and Functional and Cognitive Screening:  Functional & Cognitive Status 9/8/2022   Do you have difficulty preparing food and eating? No   Do you have difficulty bathing yourself, getting dressed or grooming yourself? No   Do you have difficulty using the toilet? No   Do you have difficulty moving around from place to place? No   Do you have trouble with steps or getting out of a bed or a chair? No   Current Diet Well Balanced Diet   Dental Exam Not up to date   Eye Exam Not up to date   Exercise (times per week) 3 times per week   Current Exercises Include Walking;House Cleaning   Current Exercise Activities Include -   Do you need help using the phone?  No   Are you deaf or do you have serious difficulty hearing?  No   Do you need help with transportation? No   Do you need help shopping? No   Do you need help preparing meals?  No   Do you need help with housework?  No   Do you need help with laundry? No   Do you need help taking your medications? No   Do you need help managing money? No   Do you ever drive or ride in a car without wearing a seat belt? No   Have you felt unusual stress, anger or loneliness in the last month? No   Who do you live with? Spouse   If you need help, do you have trouble  finding someone available to you? No   Have you been bothered in the last four weeks by sexual problems? No   Do you have difficulty concentrating, remembering or making decisions? No       Age-appropriate Screening Schedule:  Refer to the list below for future screening recommendations based on patient's age, sex and/or medical conditions. Orders for these recommended tests are listed in the plan section. The patient has been provided with a written plan.    Health Maintenance   Topic Date Due   • TDAP/TD VACCINES (1 - Tdap) Never done   • ZOSTER VACCINE (1 of 2) Never done   • HEMOGLOBIN A1C  09/02/2022   • DIABETIC EYE EXAM  11/22/2022 (Originally 10/16/2020)   • INFLUENZA VACCINE  10/01/2022   • LIPID PANEL  03/02/2023   • URINE MICROALBUMIN  03/02/2023   • DXA SCAN  03/30/2023   • DIABETIC FOOT EXAM  09/08/2023   • MAMMOGRAM  Discontinued              Assessment & Plan   CMS Preventative Services Quick Reference  Risk Factors Identified During Encounter  Cardiovascular Disease  The above risks/problems have been discussed with the patient.  Follow up actions/plans if indicated are seen below in the Assessment/Plan Section.  Pertinent information has been shared with the patient in the After Visit Summary.    Diagnoses and all orders for this visit:    1. Essential hypertension (Primary)  -     Comprehensive metabolic panel  -     Hemoglobin A1c  -     Lipid Panel With / Chol / HDL Ratio    2. Type 2 diabetes mellitus with stage 3 chronic kidney disease, without long-term current use of insulin, unspecified whether stage 3a or 3b CKD (HCC)  -     Comprehensive metabolic panel  -     Hemoglobin A1c  -     Lipid Panel With / Chol / HDL Ratio    3. Stage 3 chronic kidney disease, unspecified whether stage 3a or 3b CKD (HCC)        Follow Up:   No follow-ups on file.     An After Visit Summary and PPPS were made available to the patient.          I spent 5  minutes caring for Cee on this date of service. This  time includes time spent by me in the following activities:reviewing tests, obtaining and/or reviewing a separately obtained history, referring and communicating with other health care professionals , documenting information in the medical record, independently interpreting results and communicating that information with the patient/family/caregiver and care coordination

## 2022-09-09 LAB
ALBUMIN SERPL-MCNC: 4.3 G/DL (ref 3.5–5.2)
ALBUMIN/GLOB SERPL: 1.9 G/DL
ALP SERPL-CCNC: 65 U/L (ref 39–117)
ALT SERPL-CCNC: 13 U/L (ref 1–33)
AST SERPL-CCNC: 22 U/L (ref 1–32)
BILIRUB SERPL-MCNC: 0.8 MG/DL (ref 0–1.2)
BUN SERPL-MCNC: 15 MG/DL (ref 8–23)
BUN/CREAT SERPL: 15.6 (ref 7–25)
CALCIUM SERPL-MCNC: 9.9 MG/DL (ref 8.6–10.5)
CHLORIDE SERPL-SCNC: 96 MMOL/L (ref 98–107)
CHOLEST SERPL-MCNC: 197 MG/DL (ref 0–200)
CHOLEST/HDLC SERPL: 4.8 {RATIO}
CO2 SERPL-SCNC: 30 MMOL/L (ref 22–29)
CREAT SERPL-MCNC: 0.96 MG/DL (ref 0.57–1)
EGFRCR-CYS SERPLBLD CKD-EPI 2021: 61.8 ML/MIN/1.73
GLOBULIN SER CALC-MCNC: 2.3 GM/DL
GLUCOSE SERPL-MCNC: 115 MG/DL (ref 65–99)
HBA1C MFR BLD: 5.3 % (ref 4.8–5.6)
HDLC SERPL-MCNC: 41 MG/DL (ref 40–60)
LDLC SERPL CALC-MCNC: 128 MG/DL (ref 0–100)
POTASSIUM SERPL-SCNC: 3.7 MMOL/L (ref 3.5–5.2)
PROT SERPL-MCNC: 6.6 G/DL (ref 6–8.5)
SODIUM SERPL-SCNC: 137 MMOL/L (ref 136–145)
TRIGL SERPL-MCNC: 154 MG/DL (ref 0–150)
VLDLC SERPL CALC-MCNC: 28 MG/DL (ref 5–40)

## 2022-09-09 RX ORDER — ATORVASTATIN CALCIUM 40 MG/1
40 TABLET, FILM COATED ORAL DAILY
Qty: 90 TABLET | Refills: 1 | Status: SHIPPED | OUTPATIENT
Start: 2022-09-09 | End: 2023-01-30

## 2022-09-12 RX ORDER — LETROZOLE 2.5 MG/1
2.5 TABLET, FILM COATED ORAL DAILY
Qty: 60 TABLET | Refills: 3 | Status: SHIPPED | OUTPATIENT
Start: 2022-09-12 | End: 2023-03-01

## 2022-10-18 DIAGNOSIS — Z79.811 LONG TERM (CURRENT) USE OF AROMATASE INHIBITORS: ICD-10-CM

## 2022-10-18 DIAGNOSIS — C50.211 MALIGNANT NEOPLASM OF UPPER-INNER QUADRANT OF RIGHT BREAST IN FEMALE, ESTROGEN RECEPTOR POSITIVE: ICD-10-CM

## 2022-10-18 DIAGNOSIS — M85.851 OSTEOPENIA OF NECK OF RIGHT FEMUR: Primary | ICD-10-CM

## 2022-10-18 DIAGNOSIS — Z17.0 MALIGNANT NEOPLASM OF UPPER-INNER QUADRANT OF RIGHT BREAST IN FEMALE, ESTROGEN RECEPTOR POSITIVE: ICD-10-CM

## 2022-10-28 ENCOUNTER — INFUSION (OUTPATIENT)
Dept: ONCOLOGY | Facility: HOSPITAL | Age: 77
End: 2022-10-28

## 2022-10-28 ENCOUNTER — LAB (OUTPATIENT)
Dept: LAB | Facility: HOSPITAL | Age: 77
End: 2022-10-28

## 2022-10-28 VITALS
HEIGHT: 61 IN | DIASTOLIC BLOOD PRESSURE: 61 MMHG | BODY MASS INDEX: 37.31 KG/M2 | HEART RATE: 58 BPM | TEMPERATURE: 97.3 F | WEIGHT: 197.6 LBS | OXYGEN SATURATION: 99 % | RESPIRATION RATE: 18 BRPM | SYSTOLIC BLOOD PRESSURE: 116 MMHG

## 2022-10-28 DIAGNOSIS — E83.42 HYPOMAGNESEMIA: Primary | ICD-10-CM

## 2022-10-28 DIAGNOSIS — Z17.0 MALIGNANT NEOPLASM OF UPPER-INNER QUADRANT OF RIGHT BREAST IN FEMALE, ESTROGEN RECEPTOR POSITIVE: ICD-10-CM

## 2022-10-28 DIAGNOSIS — Z79.811 LONG TERM (CURRENT) USE OF AROMATASE INHIBITORS: ICD-10-CM

## 2022-10-28 DIAGNOSIS — C50.211 MALIGNANT NEOPLASM OF UPPER-INNER QUADRANT OF RIGHT BREAST IN FEMALE, ESTROGEN RECEPTOR POSITIVE: ICD-10-CM

## 2022-10-28 DIAGNOSIS — M85.851 OSTEOPENIA OF NECK OF RIGHT FEMUR: ICD-10-CM

## 2022-10-28 LAB
ALBUMIN SERPL-MCNC: 4.1 G/DL (ref 3.5–5.2)
ALBUMIN/GLOB SERPL: 1.3 G/DL
ALP SERPL-CCNC: 79 U/L (ref 39–117)
ALT SERPL W P-5'-P-CCNC: 9 U/L (ref 1–33)
ANION GAP SERPL CALCULATED.3IONS-SCNC: 10 MMOL/L (ref 5–15)
AST SERPL-CCNC: 13 U/L (ref 1–32)
BILIRUB SERPL-MCNC: 0.7 MG/DL (ref 0–1.2)
BUN SERPL-MCNC: 17 MG/DL (ref 8–23)
BUN/CREAT SERPL: 14.4 (ref 7–25)
CALCIUM SPEC-SCNC: 10.5 MG/DL (ref 8.6–10.5)
CHLORIDE SERPL-SCNC: 96 MMOL/L (ref 98–107)
CO2 SERPL-SCNC: 31 MMOL/L (ref 22–29)
CREAT SERPL-MCNC: 1.18 MG/DL (ref 0.57–1)
EGFRCR SERPLBLD CKD-EPI 2021: 48.3 ML/MIN/1.73
GLOBULIN UR ELPH-MCNC: 3.2 GM/DL
GLUCOSE SERPL-MCNC: 122 MG/DL (ref 65–99)
MAGNESIUM SERPL-MCNC: 1.3 MG/DL (ref 1.6–2.4)
PHOSPHATE SERPL-MCNC: 4.5 MG/DL (ref 2.5–4.5)
POTASSIUM SERPL-SCNC: 3.3 MMOL/L (ref 3.5–5.2)
PROT SERPL-MCNC: 7.3 G/DL (ref 6–8.5)
SODIUM SERPL-SCNC: 137 MMOL/L (ref 136–145)

## 2022-10-28 PROCEDURE — 96366 THER/PROPH/DIAG IV INF ADDON: CPT

## 2022-10-28 PROCEDURE — 36415 COLL VENOUS BLD VENIPUNCTURE: CPT

## 2022-10-28 PROCEDURE — 96365 THER/PROPH/DIAG IV INF INIT: CPT

## 2022-10-28 PROCEDURE — 25010000002 MAGNESIUM SULFATE 2 GM/50ML SOLUTION: Performed by: NURSE PRACTITIONER

## 2022-10-28 PROCEDURE — 83735 ASSAY OF MAGNESIUM: CPT

## 2022-10-28 PROCEDURE — 80053 COMPREHEN METABOLIC PANEL: CPT

## 2022-10-28 PROCEDURE — 25010000002 DENOSUMAB 60 MG/ML SOLUTION PREFILLED SYRINGE: Performed by: INTERNAL MEDICINE

## 2022-10-28 PROCEDURE — 96372 THER/PROPH/DIAG INJ SC/IM: CPT

## 2022-10-28 PROCEDURE — 84100 ASSAY OF PHOSPHORUS: CPT

## 2022-10-28 RX ORDER — MAGNESIUM SULFATE HEPTAHYDRATE 40 MG/ML
2 INJECTION, SOLUTION INTRAVENOUS ONCE
Status: COMPLETED | OUTPATIENT
Start: 2022-10-28 | End: 2022-10-28

## 2022-10-28 RX ORDER — SODIUM CHLORIDE 9 MG/ML
250 INJECTION, SOLUTION INTRAVENOUS ONCE
Status: CANCELLED | OUTPATIENT
Start: 2022-10-28

## 2022-10-28 RX ORDER — SODIUM CHLORIDE 9 MG/ML
250 INJECTION, SOLUTION INTRAVENOUS ONCE
Status: COMPLETED | OUTPATIENT
Start: 2022-10-28 | End: 2022-10-28

## 2022-10-28 RX ADMIN — SODIUM CHLORIDE 250 ML: 9 INJECTION, SOLUTION INTRAVENOUS at 13:55

## 2022-10-28 RX ADMIN — MAGNESIUM SULFATE HEPTAHYDRATE 2 G: 2 INJECTION, SOLUTION INTRAVENOUS at 13:56

## 2022-10-28 RX ADMIN — DENOSUMAB 60 MG: 60 INJECTION SUBCUTANEOUS at 14:31

## 2022-10-28 NOTE — PROGRESS NOTES
[id number] Sonja with Dr. Cartwright called to report ok to give prolia, instruct patient to eat more potassium rich foods and give magnesium run IV 2 GM. Patient informed. Nelia Blue RN

## 2022-10-31 ENCOUNTER — TELEPHONE (OUTPATIENT)
Dept: ONCOLOGY | Facility: CLINIC | Age: 77
End: 2022-10-31

## 2022-10-31 NOTE — TELEPHONE ENCOUNTER
This matter was addressed on Friday 10/28/22   2 Grams Magnesium IV  Patient was encouraged to increase her potassium rich foods, OJ, bananas.

## 2022-10-31 NOTE — TELEPHONE ENCOUNTER
----- Message from Andrea Cartwright MD sent at 10/30/2022  1:44 PM CDT -----  During orange juice or eat a banana.  Potassium 3.3.    Order 2 g IV magnesium.

## 2023-01-30 RX ORDER — ATORVASTATIN CALCIUM 40 MG/1
40 TABLET, FILM COATED ORAL DAILY
Qty: 90 TABLET | Refills: 3 | Status: SHIPPED | OUTPATIENT
Start: 2023-01-30

## 2023-02-22 ENCOUNTER — LAB (OUTPATIENT)
Dept: LAB | Facility: HOSPITAL | Age: 78
End: 2023-02-22
Payer: MEDICARE

## 2023-02-22 DIAGNOSIS — Z85.3 HISTORY OF BREAST CANCER: ICD-10-CM

## 2023-02-22 DIAGNOSIS — Z79.811 LONG TERM (CURRENT) USE OF AROMATASE INHIBITORS: ICD-10-CM

## 2023-02-22 DIAGNOSIS — M85.851 OSTEOPENIA OF NECK OF RIGHT FEMUR: ICD-10-CM

## 2023-02-22 DIAGNOSIS — D63.1 ANEMIA IN STAGE 3A CHRONIC KIDNEY DISEASE: ICD-10-CM

## 2023-02-22 DIAGNOSIS — N18.31 ANEMIA IN STAGE 3A CHRONIC KIDNEY DISEASE: ICD-10-CM

## 2023-02-22 LAB
ALBUMIN SERPL-MCNC: 4.4 G/DL (ref 3.5–5.2)
ALBUMIN/GLOB SERPL: 1.4 G/DL
ALP SERPL-CCNC: 78 U/L (ref 39–117)
ALT SERPL W P-5'-P-CCNC: 12 U/L (ref 1–33)
ANION GAP SERPL CALCULATED.3IONS-SCNC: 11 MMOL/L (ref 5–15)
AST SERPL-CCNC: 16 U/L (ref 1–32)
BASOPHILS # BLD AUTO: 0.04 10*3/MM3 (ref 0–0.2)
BASOPHILS NFR BLD AUTO: 0.5 % (ref 0–1.5)
BILIRUB SERPL-MCNC: 0.5 MG/DL (ref 0–1.2)
BUN SERPL-MCNC: 19 MG/DL (ref 8–23)
BUN/CREAT SERPL: 18.3 (ref 7–25)
CALCIUM SPEC-SCNC: 10.3 MG/DL (ref 8.6–10.5)
CEA SERPL-MCNC: 2.04 NG/ML
CHLORIDE SERPL-SCNC: 100 MMOL/L (ref 98–107)
CO2 SERPL-SCNC: 26 MMOL/L (ref 22–29)
CREAT SERPL-MCNC: 1.04 MG/DL (ref 0.57–1)
DEPRECATED RDW RBC AUTO: 45.1 FL (ref 37–54)
EGFRCR SERPLBLD CKD-EPI 2021: 55.8 ML/MIN/1.73
EOSINOPHIL # BLD AUTO: 0.32 10*3/MM3 (ref 0–0.4)
EOSINOPHIL NFR BLD AUTO: 3.8 % (ref 0.3–6.2)
ERYTHROCYTE [DISTWIDTH] IN BLOOD BY AUTOMATED COUNT: 13.6 % (ref 12.3–15.4)
GLOBULIN UR ELPH-MCNC: 3.1 GM/DL
GLUCOSE SERPL-MCNC: 114 MG/DL (ref 65–99)
HCT VFR BLD AUTO: 41.2 % (ref 34–46.6)
HGB BLD-MCNC: 13.2 G/DL (ref 12–15.9)
IMM GRANULOCYTES # BLD AUTO: 0.04 10*3/MM3 (ref 0–0.05)
IMM GRANULOCYTES NFR BLD AUTO: 0.5 % (ref 0–0.5)
LYMPHOCYTES # BLD AUTO: 2.32 10*3/MM3 (ref 0.7–3.1)
LYMPHOCYTES NFR BLD AUTO: 27.4 % (ref 19.6–45.3)
MCH RBC QN AUTO: 29 PG (ref 26.6–33)
MCHC RBC AUTO-ENTMCNC: 32 G/DL (ref 31.5–35.7)
MCV RBC AUTO: 90.5 FL (ref 79–97)
MONOCYTES # BLD AUTO: 0.68 10*3/MM3 (ref 0.1–0.9)
MONOCYTES NFR BLD AUTO: 8 % (ref 5–12)
NEUTROPHILS NFR BLD AUTO: 5.08 10*3/MM3 (ref 1.7–7)
NEUTROPHILS NFR BLD AUTO: 59.8 % (ref 42.7–76)
NRBC BLD AUTO-RTO: 0 /100 WBC (ref 0–0.2)
PLATELET # BLD AUTO: 282 10*3/MM3 (ref 140–450)
PMV BLD AUTO: 8.8 FL (ref 6–12)
POTASSIUM SERPL-SCNC: 4.3 MMOL/L (ref 3.5–5.2)
PROT SERPL-MCNC: 7.5 G/DL (ref 6–8.5)
RBC # BLD AUTO: 4.55 10*6/MM3 (ref 3.77–5.28)
SODIUM SERPL-SCNC: 137 MMOL/L (ref 136–145)
WBC NRBC COR # BLD: 8.48 10*3/MM3 (ref 3.4–10.8)

## 2023-02-22 PROCEDURE — 86300 IMMUNOASSAY TUMOR CA 15-3: CPT

## 2023-02-22 PROCEDURE — 80053 COMPREHEN METABOLIC PANEL: CPT

## 2023-02-22 PROCEDURE — 82378 CARCINOEMBRYONIC ANTIGEN: CPT

## 2023-02-22 PROCEDURE — 85025 COMPLETE CBC W/AUTO DIFF WBC: CPT

## 2023-02-22 PROCEDURE — 36415 COLL VENOUS BLD VENIPUNCTURE: CPT

## 2023-02-23 LAB — CANCER AG27-29 SERPL-ACNC: 24.7 U/ML (ref 0–38.6)

## 2023-02-24 DIAGNOSIS — C50.211 MALIGNANT NEOPLASM OF UPPER-INNER QUADRANT OF RIGHT BREAST IN FEMALE, ESTROGEN RECEPTOR POSITIVE: Primary | ICD-10-CM

## 2023-02-24 DIAGNOSIS — Z17.0 MALIGNANT NEOPLASM OF UPPER-INNER QUADRANT OF RIGHT BREAST IN FEMALE, ESTROGEN RECEPTOR POSITIVE: Primary | ICD-10-CM

## 2023-03-01 ENCOUNTER — OFFICE VISIT (OUTPATIENT)
Dept: ONCOLOGY | Facility: CLINIC | Age: 78
End: 2023-03-01
Payer: MEDICARE

## 2023-03-01 VITALS
HEIGHT: 61 IN | OXYGEN SATURATION: 98 % | TEMPERATURE: 97.5 F | WEIGHT: 197 LBS | DIASTOLIC BLOOD PRESSURE: 88 MMHG | HEART RATE: 74 BPM | BODY MASS INDEX: 37.19 KG/M2 | SYSTOLIC BLOOD PRESSURE: 148 MMHG | RESPIRATION RATE: 16 BRPM

## 2023-03-01 DIAGNOSIS — N18.31 STAGE 3A CHRONIC KIDNEY DISEASE: ICD-10-CM

## 2023-03-01 DIAGNOSIS — Z85.3 HISTORY OF BREAST CANCER: Primary | ICD-10-CM

## 2023-03-01 DIAGNOSIS — M85.851 OSTEOPENIA OF NECK OF RIGHT FEMUR: Primary | ICD-10-CM

## 2023-03-01 DIAGNOSIS — M85.851 OSTEOPENIA OF NECK OF RIGHT FEMUR: ICD-10-CM

## 2023-03-01 PROCEDURE — 99214 OFFICE O/P EST MOD 30 MIN: CPT | Performed by: NURSE PRACTITIONER

## 2023-03-01 RX ORDER — LETROZOLE 2.5 MG/1
2.5 TABLET, FILM COATED ORAL DAILY
Qty: 60 TABLET | Refills: 3 | Status: SHIPPED | OUTPATIENT
Start: 2023-03-01

## 2023-03-01 NOTE — PROGRESS NOTES
[facility]  [address]  [place] [address] [phone]    Patient Name: Cee Quiles  Encounter Date: 03/01/2023  YOB: 1945  Patient Number: [id number]      REASON FOR FOLLOW-UP: Cee Quiles is a pleasant 78 y.o.  female who is seen on followup for Stage IA right breast cancer, receptor positive, and HER-2/jeremy negative.  Low recurrence score of 6, absolute benefit of chemo is less than 1%.   She is on adjuvant letrozole.  She is also seen for anemia from iron deficiency and chronic kidney disease.      Pt had colonoscopy on 08/29/22.  Biopsy with benign colonic mucosa.  Histologic changes of an adenomatous polyp or hyperplastic polyp are not identified.  Adenomatous polyp, tubular type.  No high-grade dysplasia identified.       INTERVAL HISTORY     Pt presents to clinic today for continued follow up.  She has no acute complaints today.  She has osteopenia and has been taking Femara which she is tolerating well.     She had labs drawn and results were reviewed with her in office           Oncology/Hematology History Overview Note   DIAGNOSTIC ABNORMALITIES:  The patient was found to have a right breast mass by mammogram at [facility].   Right breast core biopsy 01/17/2019 showed infiltrating lobular carcinoma, grade 1. Greatest dimension of tumor measured 11.1 mm. ER 98%, HI 92%, HER-2/jeremy +1 and negative FISH, and Ki-67 at 7%,. Oncotype DX report. Low recurrence score of 6, absolute benefit of chemo is less than 1%  Breast MRI [facility] 01/30/2019, 1.8 x 0.7 x 0.6 cm, non mass-like linear enhancement at 1 o'clock. Second suspicious area of enhancement in the right breast just behind the nipple measuring 1 x 1 x 0.9 cm. Two areas of linear non mass-like enhancement has anterior depth at 1 o'clock in the left breast.   The patient was seen by Dr. Hodges  02/01/2019. Planned for bilateral mastectomy and sentinel lymph node evaluation.   Ultrasound biopsy left breast showed atypical lobular hyperplasia. No histologic evidence of malignancy.   The patient had a followup with Dr. Hodges 02/13/2019.  CBC 02/13/2019 revealed a WBC of 10.6, hemoglobin 12.2, hematocrit 36.8, MCV 86.4, platelet count 211,000 with ANC of 9.4. CMP remarkable for a GFR of 46 mL/minute.  Pathology report 02/15/2019 left breast showed no evidence of in situ or invasive carcinoma. Right breast showed an infiltrating lobular carcinoma, grade 1 with residual 8 mm.       PREVIOUS INTERVENTIONS:   Bilateral mastectomy with right breast sentinel lymph node biopsy on 02/12/2019.  Adjuvant Femara 2.5 mg p.o. daily 03/22/2019 through present.      PREVIOUS INTERVENTIONS:  Ferrous sulfate 325 mg daily 04/22/2020 through 05/20/2020.  Resume 07/17/2020 through 08/20/2020.     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 breast in female, estrogen receptor positive (CMS/HCC)     4/24/2020 -  Chemotherapy    OP SUPPORTIVE Denosumab (Prolia) Q6M         PAST MEDICAL HISTORY:  ALLERGIES:  Allergies   Allergen Reactions   • Keflex [Cephalexin] Anaphylaxis   • Lortab [Hydrocodone-Acetaminophen] Nausea Only     CURRENT MEDICATIONS:  Outpatient Encounter Medications as of 3/1/2023   Medication Sig Dispense Refill   • amLODIPine (NORVASC) 5 MG tablet      • atorvastatin (LIPITOR) 40 MG tablet TAKE 1 TABLET BY MOUTH  DAILY 90 tablet 3   • calcium citrate-vitamin d (CALCITRATE) 315-250 MG-UNIT tablet tablet Take  by mouth Daily.     • Denosumab (PROLIA SC) Inject  under the skin into the appropriate area as directed.     • furosemide (LASIX) 20 MG tablet Take 1 tablet by mouth 2 (Two) Times a Day.     •  glipizide (GLUCOTROL XL) 2.5 MG 24 hr tablet TAKE 1 TABLET BY MOUTH  DAILY 90 tablet 3   • letrozole (FEMARA) 2.5 MG tablet Take 1 tablet by mouth Daily. 60 tablet 3   • levothyroxine (SYNTHROID, LEVOTHROID) 100 MCG tablet TAKE 1 TABLET BY MOUTH  DAILY 90 tablet 3   • Magnesium Oxide -Mg Supplement 400 MG capsule Take 1 capsule by mouth Every 12 (Twelve) Hours.     • meclizine (ANTIVERT) 25 MG tablet Take 1 tablet by mouth 3 (Three) Times a Day As Needed for Dizziness.     • nebivolol (BYSTOLIC) 20 MG tablet TAKE 1 TABLET BY MOUTH  DAILY 90 tablet 3   • [DISCONTINUED] letrozole (FEMARA) 2.5 MG tablet TAKE 1 TABLET BY MOUTH  DAILY 60 tablet 3     No facility-administered encounter medications on file as of 3/1/2023.     ADULT ILLNESSES:  Patient Active Problem List   Diagnosis Code   • Type 2 diabetes mellitus with diabetic chronic kidney disease ([facility]) E11.22   • Essential hypertension I10   • Acquired hypothyroidism E03.9   • Chronic pain of right knee M25.561, G89.29   • CKD (chronic kidney disease) stage 3, GFR 30-59 ml/min ([facility]) N18.30   • Mixed hyperlipidemia E78.2   • Morbidly obese ([facility]) E66.01   • Breast CA ([facility]) C50.919   • Postmenopausal Z78.0   • Hypomagnesemia E83.42   • Osteopenia of neck of femur M85.859   • Malignant neoplasm of upper-inner quadrant of right breast in female, estrogen receptor positive ([facility]) C50.211, Z17.0   • Long term (current) use of aromatase inhibitors Z79.811   • Hx of adenomatous colonic polyps Z86.010     SURGERIES:  Past Surgical History:   Procedure Laterality Date   • CHOLECYSTECTOMY     • COLONOSCOPY N/A 8/29/2022    Procedure: COLONOSCOPY WITH ANESTHESIA;  Surgeon: Rico Hilton MD;  Location: [facility] ENDOSCOPY;  Service: Gastroenterology;  Laterality: N/A;  pre hx colon polyp  post polyp  dr rusty lira   • COLONOSCOPY W/ POLYPECTOMY  02/08/2012    Pedunculated polyp cecal pit, Hyperplastic polyp at 15 cm repeat exam in 5 years   • COLONOSCOPY W/ POLYPECTOMY   07/31/2017    Tubular adenoma hepatic flexure repeat exam in 5 years   • KNEE SURGERY Right    • MASTECTOMY W/ SENTINEL NODE BIOPSY Bilateral 02/12/2019    Procedure: BILATERAL MASTECTOMY WITH RIGHT BREAST SENTINEL LYMPH NODE BIOPSY - RADIOLOGIST WILL INJECT;  Surgeon: Michael Hodges MD;  Location: [facility] OR;  Service: General   • OVARIAN CYST DRAINAGE Left    • TUMOR REMOVAL Left      HEALTH MAINTENANCE ITEMS:  Health Maintenance Due   Topic Date Due   • TDAP/TD VACCINES (1 - Tdap) Never done   • ZOSTER VACCINE (1 of 2) Never done   • DIABETIC EYE EXAM  10/16/2020   • Pneumococcal Vaccine 65+ (2 - PPSV23 if available, else PCV20) 11/18/2020   • COVID-19 Vaccine (3 - Booster) 05/17/2021   • INFLUENZA VACCINE  08/01/2022       <no information>  Last Completed Colonoscopy          COLORECTAL CANCER SCREENING (COLONOSCOPY - Every 5 Years) Next due on 8/29/2027 08/29/2022  COLONOSCOPY    08/29/2022  Surgical Procedure: COLONOSCOPY    04/24/2020  Occult Blood X 3, Stool - Stool, Per Rectum    08/14/2017  SCANNED - COLONOSCOPY    02/08/2012  SCANNED - COLONOSCOPY    Only the first 5 history entries have been loaded, but more history exists.              Immunization History   Administered Date(s) Administered   • COVID-19 (MODERNA) 1st, 2nd, 3rd Dose Only 02/23/2021, 03/22/2021   • FLUAD TRI 65YR+ 11/18/2019   • Flu Vaccine Quad PF >36MO 11/13/2017   • Fluad Quad 65+ 11/18/2019   • Fluzone High Dose =>65 Years ([employer] ONLY) 10/23/2015, 10/31/2016, 10/31/2018   • Fluzone Quad >6mos (Multi-dose) 11/13/2017   • Influenza Quad Vaccine (Inpatient) 11/13/2017   • Pneumococcal Conjugate 13-Valent (PCV13) 11/18/2019     Last Completed Mammogram          Discontinued - MAMMOGRAM  Discontinued    01/30/2019  MRI Breast Bilateral With & Without Contrast    12/21/2018  Mammo diagnostic digital tomosynthesis right w CAD    12/17/2018  Mammo Screening Bilateral With CAD    12/16/2016  SCANNED - MAMMO    12/05/2014  Outside  "Claim: INACTIVE -[facility] MAMMOGRAM SCREENING BILAT DIGITAL W CAD    Only the first 5 history entries have been loaded, but more history exists.                  FAMILY HISTORY:  Family History   Problem Relation Age of Onset   • Colon cancer Neg Hx    • Colon polyps Neg Hx      SOCIAL HISTORY:  Social History     Socioeconomic History   • Marital status:    Tobacco Use   • Smoking status: Never   • Smokeless tobacco: Never   Substance and Sexual Activity   • Alcohol use: No   • Drug use: No   • Sexual activity: Defer       REVIEW OF SYSTEMS:    Review of Systems   Constitutional: Negative for chills, fatigue and fever.   HENT: Negative for congestion, mouth sores and trouble swallowing.    Eyes: Negative for redness and visual disturbance.   Respiratory: Negative for cough and shortness of breath.    Cardiovascular: Negative for chest pain and palpitations.   Gastrointestinal: Negative for abdominal pain, nausea and vomiting.   Endocrine: Negative for polydipsia and polyphagia.   Genitourinary: Negative for difficulty urinating, dysuria and flank pain.   Musculoskeletal: Negative for gait problem and joint swelling.   Skin: Negative for pallor.   Allergic/Immunologic: Negative for food allergies.   Neurological: Negative for dizziness, speech difficulty and weakness.   Hematological: Negative for adenopathy. Does not bruise/bleed easily.   Psychiatric/Behavioral: Negative for agitation, confusion and hallucinations.       VITAL SIGNS: /88   Pulse 74   Temp 97.5 °F (36.4 °C)   Resp 16   Ht 154.9 cm (61\")   Wt 89.4 kg (197 lb)   SpO2 98%   BMI 37.22 kg/m²   Pain Score    03/01/23 0901   PainSc: 0-No pain       PHYSICAL EXAMINATION:     Physical Exam  Vitals reviewed.   Constitutional:       General: She is not in acute distress.  HENT:      Head: Normocephalic and atraumatic.   Eyes:      General: No scleral icterus.  Cardiovascular:      Rate and Rhythm: Normal rate.   Pulmonary:      Effort: No " respiratory distress.      Breath sounds: No wheezing or rales.   Chest:      Comments: Bilateral mastectomy scars.   No palpable evidence of reoccurrence    Abdominal:      General: Bowel sounds are normal.      Palpations: Abdomen is soft.   Musculoskeletal:      Cervical back: Neck supple.      Right lower leg: Edema present.      Left lower leg: Edema present.   Lymphadenopathy:      Upper Body:      Right upper body: No axillary adenopathy.      Left upper body: No axillary adenopathy.   Skin:     General: Skin is warm and dry.      Coloration: Skin is not pale.   Neurological:      Mental Status: She is alert and oriented to person, place, and time.   Psychiatric:         Mood and Affect: Mood normal.         Behavior: Behavior normal.         LABS    Lab Results - Last 18 Months   Lab Units 02/22/23  0854 08/04/22  0934 04/06/22  1324 03/02/22  1256 12/08/21  1043   HEMOGLOBIN g/dL 13.2 12.8 13.3 13.1 12.9   HEMATOCRIT % 41.2 38.3 38.6 39.5 39.1   MCV fL 90.5 84.2 83.4 83.3 82.0   WBC 10*3/mm3 8.48 8.03 7.47 7.71 7.12   RDW % 13.6 13.6 13.7 14.3 13.9   MPV fL 8.8 9.4 9.2  --  9.2   PLATELETS 10*3/mm3 282 250 267 273 262   IMM GRAN % % 0.5 0.5 0.5  --  0.8*   NEUTROS ABS 10*3/mm3 5.08 5.28 4.77 5.04 4.63   LYMPHS ABS 10*3/mm3 2.32 1.73 1.80 1.82 1.60   MONOS ABS 10*3/mm3 0.68 0.61 0.52 0.59 0.49   EOS ABS 10*3/mm3 0.32 0.34 0.31 0.20 0.31   BASOS ABS 10*3/mm3 0.04 0.03 0.03 0.04 0.03   IMMATURE GRANS (ABS) 10*3/mm3 0.04 0.04 0.04  --  0.06*   NRBC /100 WBC 0.0 0.0 0.0 0.0 0.0       Lab Results - Last 18 Months   Lab Units 02/22/23  0854 10/28/22  1306 09/08/22  0811 08/04/22  0934 04/29/22  1411 04/06/22  1324 03/02/22  1256 12/08/21  1043 10/29/21  1346   GLUCOSE mg/dL 114* 122* 115* 111* 113* 117*   < > 132* 129*   SODIUM mmol/L 137 137 137 137 141 138   < > 135* 138   POTASSIUM mmol/L 4.3 3.3* 3.7 3.5 3.2* 3.3*   < > 3.2* 3.3*   TOTAL CO2 mmol/L  --   --  30.0*  --   --   --    < >  --   --    CO2 mmol/L  26.0 31.0*  --  27.0 27.0 30.0*  --  25.0 27.0   CHLORIDE mmol/L 100 96* 96* 99 99 97*   < > 98 98   ANION GAP mmol/L 11.0 10.0  --  11.0 15.0 11.0  --  12.0 13.0   CREATININE mg/dL 1.04* 1.18* 0.96 1.16* 1.53* 1.24*   < > 1.19* 1.20*   BUN mg/dL 19 17 15 13 23 22   < > 18 24*   BUN / CREAT RATIO  18.3 14.4 15.6 11.2 15.0 17.7   < > 15.1 20.0   CALCIUM mg/dL 10.3 10.5 9.9 8.9 10.3 10.4   < > 9.4 10.3   EGFR IF NONAFRICN AM mL/min/1.73  --   --   --   --   --   --   --  44* 44*   ALK PHOS U/L 78 79 65 75 77 85   < > 85 86   TOTAL PROTEIN g/dL 7.5 7.3  --  7.0 7.0 7.0  --  7.7 7.0   ALT (SGPT) U/L 12 9 13 6 13 11   < > 8 12   AST (SGOT) U/L 16 13 22 13 16 15   < > 15 15   BILIRUBIN mg/dL 0.5 0.7 0.8 0.6 0.8 0.8   < > 0.7 0.6   ALBUMIN g/dL 4.4 4.10 4.30 4.00 4.00 4.30   < > 4.00 4.10   GLOBULIN gm/dL 3.1 3.2  --  3.0 3.0 2.7  --  3.7 2.9    < > = values in this interval not displayed.       Lab Results - Last 18 Months   Lab Units 02/22/23  0854 08/04/22  0934 04/06/22  1324 12/08/21  1043   CEA ng/mL 2.04 1.13 1.04 1.50       Lab Results - Last 18 Months   Lab Units 03/02/22  1256   TSH uIU/mL 3.220       Cee Quiles reports a pain score of 0.  No intervention indicated.       ASSESSMENT:  1. History of breast cancer    2. Osteopenia of neck of right femur    3. Stage 3a chronic kidney disease (HCC)        1.   History of Carcinoma of the right breast, infiltrating lobular.    - Low recurrence score of 6, absolute benefit of chemo is less than 1%:  Current Stage: AJCC Stage IA (T1c N0, M0, G1)   Tumor size 1.91 cm.  Hormone Status: ER 98%, ME 92%, HER-2/jeremy negative by FISH.  Baseline Node Status: 0/2 positive.  Treatment status: Post bilateral mastectomy and right sentinel node biopsy.  On adjuvant Femara 2.5 mg 03/22/2019 through present.  Plan for 10 years.   CA 27-29 is 24.7 and CEA is 2.03  -Breast exam today unremarkable for palpable masses or evidence of recurrence     2.  Chronic kidney disease   -BUN  19, Creatinine 1.04, GFR 55.8  -Hemoglobin 13.2, Hematocrit 41.2  -Continue follow up with [facility]    3.   Osteopenia   4. Long term use of AI  - Taking Femara 2.5 mg 03/22/2019 through present.  Plan for 10 years.   -No s/s of toxicity.  Monitor for hot flashes,osteonecrosis  -On denosumab (Prolia) q 6 months.   -Last prolia October 10/28/22, Scheduled for April 28   -Ordered repeat DEXA         PLAN:  Stable for observation  Schedule denosumab 60 mg subcutaneously every 6 months  -Bone density improved and still on Femara.  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  -Ordered bone density exam to follow osteopenia.  -Continue ongoing management per primary care physician and other specialists.  Return to office in 6 months with labs one week before appt with CBC with differential, CEA, CA27-29, and CMP  Plan of care discussed with patient.  Understanding expressed.  Patient agreeable to proceed.      Advance Care Planning   ACP discussion was declined by the patient. Patient does not have an advance directive, information provided.    Shirley Milner, [facility]  03/01/2023

## 2023-03-13 ENCOUNTER — LAB (OUTPATIENT)
Dept: FAMILY MEDICINE CLINIC | Facility: CLINIC | Age: 78
End: 2023-03-13
Payer: MEDICARE

## 2023-03-13 DIAGNOSIS — Z85.3 HISTORY OF BREAST CANCER: ICD-10-CM

## 2023-03-13 DIAGNOSIS — M85.851 OSTEOPENIA OF NECK OF RIGHT FEMUR: ICD-10-CM

## 2023-03-14 LAB
ALBUMIN SERPL-MCNC: 4.2 G/DL (ref 3.5–5.2)
ALBUMIN/GLOB SERPL: 1.6 G/DL
ALP SERPL-CCNC: 73 U/L (ref 39–117)
ALT SERPL-CCNC: 10 U/L (ref 1–33)
AST SERPL-CCNC: 15 U/L (ref 1–32)
BASOPHILS # BLD AUTO: 0.03 10*3/MM3 (ref 0–0.2)
BASOPHILS NFR BLD AUTO: 0.4 % (ref 0–1.5)
BILIRUB SERPL-MCNC: 0.8 MG/DL (ref 0–1.2)
BUN SERPL-MCNC: 11 MG/DL (ref 8–23)
BUN/CREAT SERPL: 11.3 (ref 7–25)
CALCIUM SERPL-MCNC: 10 MG/DL (ref 8.6–10.5)
CANCER AG27-29 SERPL-ACNC: 26.1 U/ML (ref 0–38.6)
CEA SERPL-MCNC: 2.5 NG/ML
CHLORIDE SERPL-SCNC: 102 MMOL/L (ref 98–107)
CO2 SERPL-SCNC: 25.4 MMOL/L (ref 22–29)
CREAT SERPL-MCNC: 0.97 MG/DL (ref 0.57–1)
EGFRCR SERPLBLD CKD-EPI 2021: 60.7 ML/MIN/1.73
EOSINOPHIL # BLD AUTO: 0.18 10*3/MM3 (ref 0–0.4)
EOSINOPHIL NFR BLD AUTO: 2.3 % (ref 0.3–6.2)
ERYTHROCYTE [DISTWIDTH] IN BLOOD BY AUTOMATED COUNT: 13.3 % (ref 12.3–15.4)
GLOBULIN SER CALC-MCNC: 2.7 GM/DL
GLUCOSE SERPL-MCNC: 103 MG/DL (ref 65–99)
HCT VFR BLD AUTO: 37.8 % (ref 34–46.6)
HGB BLD-MCNC: 12.9 G/DL (ref 12–15.9)
IMM GRANULOCYTES # BLD AUTO: 0.03 10*3/MM3 (ref 0–0.05)
IMM GRANULOCYTES NFR BLD AUTO: 0.4 % (ref 0–0.5)
LYMPHOCYTES # BLD AUTO: 1.69 10*3/MM3 (ref 0.7–3.1)
LYMPHOCYTES NFR BLD AUTO: 21.9 % (ref 19.6–45.3)
MAGNESIUM SERPL-MCNC: 1.8 MG/DL (ref 1.6–2.4)
MCH RBC QN AUTO: 29.7 PG (ref 26.6–33)
MCHC RBC AUTO-ENTMCNC: 34.1 G/DL (ref 31.5–35.7)
MCV RBC AUTO: 86.9 FL (ref 79–97)
MONOCYTES # BLD AUTO: 0.55 10*3/MM3 (ref 0.1–0.9)
MONOCYTES NFR BLD AUTO: 7.1 % (ref 5–12)
NEUTROPHILS # BLD AUTO: 5.25 10*3/MM3 (ref 1.7–7)
NEUTROPHILS NFR BLD AUTO: 67.9 % (ref 42.7–76)
NRBC BLD AUTO-RTO: 0 /100 WBC (ref 0–0.2)
PHOSPHATE SERPL-MCNC: 3.3 MG/DL (ref 2.5–4.5)
PLATELET # BLD AUTO: 257 10*3/MM3 (ref 140–450)
POTASSIUM SERPL-SCNC: 3.8 MMOL/L (ref 3.5–5.2)
PROT SERPL-MCNC: 6.9 G/DL (ref 6–8.5)
RBC # BLD AUTO: 4.35 10*6/MM3 (ref 3.77–5.28)
SODIUM SERPL-SCNC: 140 MMOL/L (ref 136–145)
WBC # BLD AUTO: 7.73 10*3/MM3 (ref 3.4–10.8)

## 2023-03-15 ENCOUNTER — OFFICE VISIT (OUTPATIENT)
Dept: FAMILY MEDICINE CLINIC | Facility: CLINIC | Age: 78
End: 2023-03-15
Payer: MEDICARE

## 2023-03-15 VITALS
DIASTOLIC BLOOD PRESSURE: 76 MMHG | OXYGEN SATURATION: 97 % | HEIGHT: 61 IN | WEIGHT: 197 LBS | BODY MASS INDEX: 37.19 KG/M2 | HEART RATE: 70 BPM | SYSTOLIC BLOOD PRESSURE: 128 MMHG

## 2023-03-15 DIAGNOSIS — I10 ESSENTIAL HYPERTENSION: Primary | ICD-10-CM

## 2023-03-15 DIAGNOSIS — E11.22 TYPE 2 DIABETES MELLITUS WITH STAGE 3 CHRONIC KIDNEY DISEASE, WITHOUT LONG-TERM CURRENT USE OF INSULIN, UNSPECIFIED WHETHER STAGE 3A OR 3B CKD: ICD-10-CM

## 2023-03-15 DIAGNOSIS — N18.30 TYPE 2 DIABETES MELLITUS WITH STAGE 3 CHRONIC KIDNEY DISEASE, WITHOUT LONG-TERM CURRENT USE OF INSULIN, UNSPECIFIED WHETHER STAGE 3A OR 3B CKD: ICD-10-CM

## 2023-03-15 DIAGNOSIS — E78.2 MIXED HYPERLIPIDEMIA: ICD-10-CM

## 2023-03-15 DIAGNOSIS — N18.30 STAGE 3 CHRONIC KIDNEY DISEASE, UNSPECIFIED WHETHER STAGE 3A OR 3B CKD: ICD-10-CM

## 2023-03-15 LAB
EXPIRATION DATE: NORMAL
HBA1C MFR BLD: 5.6 %
Lab: NORMAL

## 2023-03-15 PROCEDURE — 99214 OFFICE O/P EST MOD 30 MIN: CPT | Performed by: FAMILY MEDICINE

## 2023-03-15 PROCEDURE — 3074F SYST BP LT 130 MM HG: CPT | Performed by: FAMILY MEDICINE

## 2023-03-15 PROCEDURE — 83036 HEMOGLOBIN GLYCOSYLATED A1C: CPT | Performed by: FAMILY MEDICINE

## 2023-03-15 PROCEDURE — 3044F HG A1C LEVEL LT 7.0%: CPT | Performed by: FAMILY MEDICINE

## 2023-03-15 PROCEDURE — 3078F DIAST BP <80 MM HG: CPT | Performed by: FAMILY MEDICINE

## 2023-03-15 PROCEDURE — 1159F MED LIST DOCD IN RCRD: CPT | Performed by: FAMILY MEDICINE

## 2023-03-15 PROCEDURE — 1160F RVW MEDS BY RX/DR IN RCRD: CPT | Performed by: FAMILY MEDICINE

## 2023-03-15 NOTE — PROGRESS NOTES
Subjective   Cee Quiles is a 78 y.o. female.     Chief Complaint   Patient presents with   • Hypertension   • Hyperlipidemia   • Diabetes   • Chronic Kidney Disease       History of Present Illness     she notes good bp control without cp ro ha--toeang luis joana tobin s--her bs are controld per her hx and she seen pheoroogy fo rckd      Current Outpatient Medications:   •  amLODIPine (NORVASC) 5 MG tablet, , Disp: , Rfl:   •  atorvastatin (LIPITOR) 40 MG tablet, TAKE 1 TABLET BY MOUTH  DAILY, Disp: 90 tablet, Rfl: 3  •  calcium citrate-vitamin d (CALCITRATE) 315-250 MG-UNIT tablet tablet, Take  by mouth Daily., Disp: , Rfl:   •  Denosumab (PROLIA SC), Inject  under the skin into the appropriate area as directed., Disp: , Rfl:   •  furosemide (LASIX) 20 MG tablet, Take 1 tablet by mouth 2 (Two) Times a Day., Disp: , Rfl:   •  glipizide (GLUCOTROL XL) 2.5 MG 24 hr tablet, TAKE 1 TABLET BY MOUTH  DAILY, Disp: 90 tablet, Rfl: 3  •  letrozole (FEMARA) 2.5 MG tablet, Take 1 tablet by mouth Daily., Disp: 60 tablet, Rfl: 3  •  levothyroxine (SYNTHROID, LEVOTHROID) 100 MCG tablet, TAKE 1 TABLET BY MOUTH  DAILY, Disp: 90 tablet, Rfl: 3  •  Magnesium Oxide -Mg Supplement 400 MG capsule, Take 1 capsule by mouth Every 12 (Twelve) Hours., Disp: , Rfl:   •  meclizine (ANTIVERT) 25 MG tablet, Take 1 tablet by mouth 3 (Three) Times a Day As Needed for Dizziness., Disp: , Rfl:   •  nebivolol (BYSTOLIC) 20 MG tablet, TAKE 1 TABLET BY MOUTH  DAILY, Disp: 90 tablet, Rfl: 3  Allergies   Allergen Reactions   • Keflex [Cephalexin] Anaphylaxis   • Lortab [Hydrocodone-Acetaminophen] Nausea Only       Class 2 Severe Obesity (BMI >=35 and <=39.9). Obesity-related health conditions include the following: hypertension, diabetes mellitus and dyslipidemias. Obesity is unchanged. BMI is is above average; BMI management plan is completed. We discussed portion control and increasing exercise.      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, left     WRIST   • Cancer (HCC)     LOBULAR RIGHT BREAST   • Chronic knee pain     BILATERAL   • Colon polyp    • Cyst of ovary, left    • Diabetes (HCC)    • Diabetes mellitus (HCC)    • Dizziness    • Elevated cholesterol    • Gallstones    • Hx of colonic polyps    • Hyperlipidemia    • Hypertension    • Hypertension    • Hypothyroid    • Kidney disease    • Long term (current) use of aromatase inhibitors 4/23/2020   • Malignant neoplasm of upper-inner quadrant of right breast in female, estrogen receptor positive (HCC) 4/23/2020   • Osteopenia of neck of femur 4/23/2020   • PONV (postoperative nausea and vomiting)    • Torn meniscus      Past Surgical History:   Procedure Laterality Date   • CHOLECYSTECTOMY     • COLONOSCOPY N/A 8/29/2022    Procedure: COLONOSCOPY WITH ANESTHESIA;  Surgeon: Rico Hilton MD;  Location: [facility] ENDOSCOPY;  Service: Gastroenterology;  Laterality: N/A;  pre hx colon polyp  post polyp  dr rusty lira   • COLONOSCOPY W/ POLYPECTOMY  02/08/2012    Pedunculated polyp cecal pit, Hyperplastic polyp at 15 cm repeat exam in 5 years   • COLONOSCOPY W/ POLYPECTOMY  07/31/2017    Tubular adenoma hepatic flexure repeat exam in 5 years   • KNEE SURGERY Right    • MASTECTOMY W/ SENTINEL NODE BIOPSY Bilateral 02/12/2019    Procedure: BILATERAL MASTECTOMY WITH RIGHT BREAST SENTINEL LYMPH NODE BIOPSY - RADIOLOGIST WILL INJECT;  Surgeon: Michael Hodges MD;  Location: [facility] OR;  Service: General   • OVARIAN CYST DRAINAGE Left    • TUMOR REMOVAL Left        Review of Systems   Constitutional: Negative.    HENT: Negative.    Eyes: Negative.    Respiratory: Negative.    Cardiovascular: Negative.    Gastrointestinal: Negative.    Endocrine: Negative.    Genitourinary: Negative.    Musculoskeletal: Negative.    Skin: Negative.    Allergic/Immunologic: Negative.    Neurological: Negative.    Hematological: Negative.   "  Psychiatric/Behavioral: Negative.        Objective  /76   Pulse 70   Ht 154.9 cm (61\")   Wt 89.4 kg (197 lb)   SpO2 97%   BMI 37.22 kg/m²   Physical Exam  Vitals and nursing note reviewed.   Constitutional:       Appearance: Normal appearance. She is normal weight.   HENT:      Head: Normocephalic and atraumatic.      Nose: Nose normal.      Mouth/Throat:      Mouth: Mucous membranes are moist.   Eyes:      Extraocular Movements: Extraocular movements intact.      Conjunctiva/sclera: Conjunctivae normal.      Pupils: Pupils are equal, round, and reactive to light.   Cardiovascular:      Rate and Rhythm: Normal rate and regular rhythm.      Pulses: Normal pulses.      Heart sounds: Normal heart sounds.   Pulmonary:      Effort: Pulmonary effort is normal.      Breath sounds: Normal breath sounds.   Abdominal:      General: Abdomen is flat. Bowel sounds are normal.      Palpations: Abdomen is soft.   Musculoskeletal:         General: Normal range of motion.      Cervical back: Normal range of motion and neck supple.   Skin:     General: Skin is warm and dry.      Capillary Refill: Capillary refill takes less than 2 seconds.   Neurological:      General: No focal deficit present.      Mental Status: She is alert and oriented to person, place, and time. Mental status is at baseline.   Psychiatric:         Mood and Affect: Mood normal.         Assessment & Plan   Diagnoses and all orders for this visit:    1. Essential hypertension (Primary)    2. Mixed hyperlipidemia    3. Type 2 diabetes mellitus with stage 3 chronic kidney disease, without long-term current use of insulin, unspecified whether stage 3a or 3b CKD (HCC)    4. Stage 3 chronic kidney disease, unspecified whether stage 3a or 3b CKD (HCC)      Fingerstick a1c toady was 5.6  She will moniro bp and bs and keep me ind  She will keep appt with nephroilogy and oncology.           No orders of the defined types were placed in this " encounter.      Follow up: 6 month(s)

## 2023-04-05 ENCOUNTER — HOSPITAL ENCOUNTER (OUTPATIENT)
Dept: BONE DENSITY | Facility: HOSPITAL | Age: 78
Discharge: HOME | End: 2023-04-05
Admitting: NURSE PRACTITIONER
Payer: MEDICARE

## 2023-04-05 DIAGNOSIS — M85.851 OSTEOPENIA OF NECK OF RIGHT FEMUR: ICD-10-CM

## 2023-04-05 PROCEDURE — 77080 DXA BONE DENSITY AXIAL: CPT

## 2023-04-07 ENCOUNTER — TELEPHONE (OUTPATIENT)
Dept: ONCOLOGY | Facility: CLINIC | Age: 78
End: 2023-04-07
Payer: MEDICARE

## 2023-04-07 NOTE — TELEPHONE ENCOUNTER
Pt notified of stable bone scan.. osteopenia. She v/u    ----- Message from BRAVO Gan sent at 4/7/2023 10:38 AM CDT -----  Stable Osteopenia.    Please let her know

## 2023-04-19 DIAGNOSIS — Z17.0 MALIGNANT NEOPLASM OF UPPER-INNER QUADRANT OF RIGHT BREAST IN FEMALE, ESTROGEN RECEPTOR POSITIVE: ICD-10-CM

## 2023-04-19 DIAGNOSIS — M85.851 OSTEOPENIA OF NECK OF RIGHT FEMUR: Primary | ICD-10-CM

## 2023-04-19 DIAGNOSIS — C50.211 MALIGNANT NEOPLASM OF UPPER-INNER QUADRANT OF RIGHT BREAST IN FEMALE, ESTROGEN RECEPTOR POSITIVE: ICD-10-CM

## 2023-04-19 DIAGNOSIS — Z79.811 LONG TERM (CURRENT) USE OF AROMATASE INHIBITORS: ICD-10-CM

## 2023-04-28 ENCOUNTER — LAB (OUTPATIENT)
Dept: LAB | Facility: HOSPITAL | Age: 78
End: 2023-04-28
Payer: MEDICARE

## 2023-04-28 ENCOUNTER — INFUSION (OUTPATIENT)
Dept: ONCOLOGY | Facility: HOSPITAL | Age: 78
End: 2023-04-28
Payer: MEDICARE

## 2023-04-28 VITALS
DIASTOLIC BLOOD PRESSURE: 73 MMHG | HEART RATE: 70 BPM | TEMPERATURE: 97.5 F | HEIGHT: 61 IN | BODY MASS INDEX: 36.82 KG/M2 | WEIGHT: 195 LBS | SYSTOLIC BLOOD PRESSURE: 143 MMHG | RESPIRATION RATE: 18 BRPM | OXYGEN SATURATION: 93 %

## 2023-04-28 DIAGNOSIS — C50.211 MALIGNANT NEOPLASM OF UPPER-INNER QUADRANT OF RIGHT BREAST IN FEMALE, ESTROGEN RECEPTOR POSITIVE: ICD-10-CM

## 2023-04-28 DIAGNOSIS — M85.851 OSTEOPENIA OF NECK OF RIGHT FEMUR: Primary | ICD-10-CM

## 2023-04-28 DIAGNOSIS — Z79.811 LONG TERM (CURRENT) USE OF AROMATASE INHIBITORS: ICD-10-CM

## 2023-04-28 DIAGNOSIS — Z17.0 MALIGNANT NEOPLASM OF UPPER-INNER QUADRANT OF RIGHT BREAST IN FEMALE, ESTROGEN RECEPTOR POSITIVE: ICD-10-CM

## 2023-04-28 LAB
ALBUMIN SERPL-MCNC: 4.3 G/DL (ref 3.5–5.2)
ALBUMIN/GLOB SERPL: 1.4 G/DL
ALP SERPL-CCNC: 74 U/L (ref 39–117)
ALT SERPL W P-5'-P-CCNC: 10 U/L (ref 1–33)
ANION GAP SERPL CALCULATED.3IONS-SCNC: 14 MMOL/L (ref 5–15)
AST SERPL-CCNC: 15 U/L (ref 1–32)
BILIRUB SERPL-MCNC: 0.8 MG/DL (ref 0–1.2)
BUN SERPL-MCNC: 21 MG/DL (ref 8–23)
BUN/CREAT SERPL: 17.5 (ref 7–25)
CALCIUM SPEC-SCNC: 10.9 MG/DL (ref 8.6–10.5)
CHLORIDE SERPL-SCNC: 98 MMOL/L (ref 98–107)
CO2 SERPL-SCNC: 28 MMOL/L (ref 22–29)
CREAT SERPL-MCNC: 1.2 MG/DL (ref 0.57–1)
EGFRCR SERPLBLD CKD-EPI 2021: 47 ML/MIN/1.73
GLOBULIN UR ELPH-MCNC: 3 GM/DL
GLUCOSE SERPL-MCNC: 121 MG/DL (ref 65–99)
MAGNESIUM SERPL-MCNC: 1.7 MG/DL (ref 1.6–2.4)
PHOSPHATE SERPL-MCNC: 4.8 MG/DL (ref 2.5–4.5)
POTASSIUM SERPL-SCNC: 3.5 MMOL/L (ref 3.5–5.2)
PROT SERPL-MCNC: 7.3 G/DL (ref 6–8.5)
SODIUM SERPL-SCNC: 140 MMOL/L (ref 136–145)
WHOLE BLOOD HOLD SPECIMEN: NORMAL

## 2023-04-28 PROCEDURE — 25010000002 DENOSUMAB 60 MG/ML SOLUTION PREFILLED SYRINGE: Performed by: NURSE PRACTITIONER

## 2023-04-28 PROCEDURE — 80053 COMPREHEN METABOLIC PANEL: CPT

## 2023-04-28 PROCEDURE — 36415 COLL VENOUS BLD VENIPUNCTURE: CPT

## 2023-04-28 PROCEDURE — 96372 THER/PROPH/DIAG INJ SC/IM: CPT

## 2023-04-28 PROCEDURE — 84100 ASSAY OF PHOSPHORUS: CPT

## 2023-04-28 PROCEDURE — 83735 ASSAY OF MAGNESIUM: CPT

## 2023-04-28 RX ADMIN — DENOSUMAB 60 MG: 60 INJECTION SUBCUTANEOUS at 13:29

## 2023-07-25 NOTE — PROGRESS NOTES
Office Established Patient Note:     Referring Provider: Dr. Sandoval    Chief complaint follow-up breast cancer    Subjective .     History of present illness:.Cee Quiles is a 78 y.o. female who is currently 5 years out from treatment of a breast cancer in the right central breast that was lobular carcinoma, bilateral mastectomy accomplished the tumor was infiltrating lobular carcinoma also atypical lobular neoplasm in the left breast she had 0 of 2 sentinel lymph nodes and she is here for follow-up.  She has had no problems she continues her follow-up with Dr. Cartwright she is on antihormonal treatment.  No evidence of any recurrence..    Currently 4 years out status post treatment of a right central breast cancer that was lobular, lymph nodes were negative, she is on inhibitors and is doing well.  She has no palpable lesions in the left or right chest, no lymphedema, no range of motion issues.  Well-healed incision right and left anterior chest, full range of motion, minimal redundant skin.  No lymphedema, no palpable lesions in the left or right chest.       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, left     WRIST    Cancer     LOBULAR RIGHT BREAST    Chronic knee pain     BILATERAL    Colon polyp     Cyst of ovary, left     Diabetes     Diabetes mellitus     Dizziness     Elevated cholesterol     Gallstones     Hx of colonic polyps     Hyperlipidemia     Hypertension     Hypertension     Hypothyroid     Kidney disease     Long term (current) use of aromatase inhibitors 4/23/2020    Malignant neoplasm of upper-inner quadrant of right breast in female, estrogen receptor positive 4/23/2020    Osteopenia of neck of femur 4/23/2020    PONV (postoperative nausea and vomiting)     Torn meniscus    ,   Past Surgical History:   Procedure Laterality Date    CHOLECYSTECTOMY      COLONOSCOPY N/A 8/29/2022    Procedure: COLONOSCOPY WITH ANESTHESIA;  Surgeon:  Rico Hilton MD;  Location: [facility] ENDOSCOPY;  Service: Gastroenterology;  Laterality: N/A;  pre hx colon polyp  post polyp  dr rusty lira    COLONOSCOPY W/ POLYPECTOMY  02/08/2012    Pedunculated polyp cecal pit, Hyperplastic polyp at 15 cm repeat exam in 5 years    COLONOSCOPY W/ POLYPECTOMY  07/31/2017    Tubular adenoma hepatic flexure repeat exam in 5 years    KNEE SURGERY Right     MASTECTOMY W/ SENTINEL NODE BIOPSY Bilateral 02/12/2019    Procedure: BILATERAL MASTECTOMY WITH RIGHT BREAST SENTINEL LYMPH NODE BIOPSY - RADIOLOGIST WILL INJECT;  Surgeon: Michael Hodges MD;  Location: [facility] OR;  Service: General    OVARIAN CYST DRAINAGE Left     TUMOR REMOVAL Left    ,   Family History   Problem Relation Age of Onset    Colon cancer Neg Hx     Colon polyps Neg Hx    ,   Social History     Tobacco Use    Smoking status: Never    Smokeless tobacco: Never   Substance Use Topics    Alcohol use: No    Drug use: No   , (Not in a hospital admission)   and Allergies:  Keflex [cephalexin] and Lortab [hydrocodone-acetaminophen]    Current Outpatient Medications:     amLODIPine (NORVASC) 5 MG tablet, , Disp: , Rfl:     atorvastatin (LIPITOR) 40 MG tablet, TAKE 1 TABLET BY MOUTH  DAILY, Disp: 90 tablet, Rfl: 3    calcium citrate-vitamin d (CALCITRATE) 315-250 MG-UNIT tablet tablet, Take  by mouth Daily., Disp: , Rfl:     Denosumab (PROLIA SC), Inject  under the skin into the appropriate area as directed., Disp: , Rfl:     furosemide (LASIX) 20 MG tablet, Take 1 tablet by mouth 2 (Two) Times a Day., Disp: , Rfl:     glipizide (GLUCOTROL XL) 2.5 MG 24 hr tablet, TAKE 1 TABLET BY MOUTH  DAILY, Disp: 90 tablet, Rfl: 3    letrozole (FEMARA) 2.5 MG tablet, Take 1 tablet by mouth Daily., Disp: 60 tablet, Rfl: 3    levothyroxine (SYNTHROID, LEVOTHROID) 100 MCG tablet, TAKE 1 TABLET BY MOUTH  DAILY, Disp: 90 tablet, Rfl: 3    Magnesium Oxide -Mg Supplement 400 MG capsule, Take 1 capsule by mouth Every 12 (Twelve) Hours.,  "Disp: , Rfl:     meclizine (ANTIVERT) 25 MG tablet, Take 1 tablet by mouth 3 (Three) Times a Day As Needed for Dizziness., Disp: , Rfl:     nebivolol (BYSTOLIC) 20 MG tablet, TAKE 1 TABLET BY MOUTH  DAILY, Disp: 90 tablet, Rfl: 3    Objective     Vital Signs   There were no vitals taken for this visit.     Physical Exam:  Nicely healed skin incisions right and left chest, minimal redundancy, no lymphedema, no adenopathy.  No x-rays needed.Physical Exam  Chest:           Results Review:  Result Review :  Lab Results   Component Value Date    FINALDX  08/29/2022     1.  \"Polyp at ascending\": Polypoid fragment of benign colonic mucosa.    Comment: Histologic changes of an adenomatous polyp or hyperplastic polyp are not identified.    2.  \"Polyp at 80 cm\":  A.  Adenomatous polyp, tubular type.  B.  No high-grade dysplasia identified.      GROSSDES  08/29/2022     1. Large Intestine.   Received in a formalin filled container labeled with the patient's name, date of birth, and \"polyp at ascending\".  The specimen consists of 1 red-brown soft tissue polyp measuring 0.3 x 0.3 x 0.3 cm, totally submitted in block 1A.    2. Large Intestine.   Received in a formalin filled container labeled with the patient's name, date of birth, and \"polyp at 80 cm\".  The specimen consists of 1 yellow-pink soft tissue polyp measuring 0.5 x 0.4 x 0.2 cm.  The polyp is bisected and totally submitted in block 2A.                Assessment & Plan     4 years status post bilateral mastectomy for lobular carcinoma, presently doing well, she will be discharged to follow-up with me if questions or problems arise.  Continue her follow-up with Dr. Sandoval and Dr. Cartwright.    Discussed BMI elevation and need to move toward a reduced BMI for health concerns she appears to understand she is a non smoker and her meds were reviewed.      Michael Hodges MD  07/25/23  13:09 CDT            "

## 2023-07-26 ENCOUNTER — OFFICE VISIT (OUTPATIENT)
Dept: SURGERY | Facility: CLINIC | Age: 78
End: 2023-07-26
Payer: MEDICARE

## 2023-07-26 VITALS
BODY MASS INDEX: 36.84 KG/M2 | DIASTOLIC BLOOD PRESSURE: 73 MMHG | HEART RATE: 70 BPM | HEIGHT: 61 IN | SYSTOLIC BLOOD PRESSURE: 143 MMHG | WEIGHT: 195.11 LBS

## 2023-07-26 DIAGNOSIS — Z79.811 LONG TERM (CURRENT) USE OF AROMATASE INHIBITORS: ICD-10-CM

## 2023-07-26 DIAGNOSIS — C50.211 MALIGNANT NEOPLASM OF UPPER-INNER QUADRANT OF RIGHT BREAST IN FEMALE, ESTROGEN RECEPTOR POSITIVE: ICD-10-CM

## 2023-07-26 DIAGNOSIS — C50.411 MALIGNANT NEOPLASM OF UPPER-OUTER QUADRANT OF RIGHT BREAST IN FEMALE, ESTROGEN RECEPTOR POSITIVE: Primary | ICD-10-CM

## 2023-07-26 DIAGNOSIS — Z17.0 MALIGNANT NEOPLASM OF UPPER-OUTER QUADRANT OF RIGHT BREAST IN FEMALE, ESTROGEN RECEPTOR POSITIVE: Primary | ICD-10-CM

## 2023-07-26 DIAGNOSIS — Z17.0 MALIGNANT NEOPLASM OF UPPER-INNER QUADRANT OF RIGHT BREAST IN FEMALE, ESTROGEN RECEPTOR POSITIVE: ICD-10-CM

## 2023-07-26 PROCEDURE — 1160F RVW MEDS BY RX/DR IN RCRD: CPT | Performed by: SPECIALIST

## 2023-07-26 PROCEDURE — 1159F MED LIST DOCD IN RCRD: CPT | Performed by: SPECIALIST

## 2023-07-26 PROCEDURE — 3078F DIAST BP <80 MM HG: CPT | Performed by: SPECIALIST

## 2023-07-26 PROCEDURE — 3077F SYST BP >= 140 MM HG: CPT | Performed by: SPECIALIST

## 2023-07-26 PROCEDURE — 99213 OFFICE O/P EST LOW 20 MIN: CPT | Performed by: SPECIALIST

## 2023-07-26 RX ORDER — HYDROCHLOROTHIAZIDE 25 MG/1
TABLET ORAL
Qty: 90 TABLET | Refills: 3 | Status: SHIPPED | OUTPATIENT
Start: 2023-07-26

## 2023-07-26 RX ORDER — LEVOTHYROXINE SODIUM 0.1 MG/1
100 TABLET ORAL DAILY
Qty: 90 TABLET | Refills: 3 | Status: SHIPPED | OUTPATIENT
Start: 2023-07-26

## 2023-07-26 RX ORDER — GLIPIZIDE 2.5 MG/1
2.5 TABLET, EXTENDED RELEASE ORAL DAILY
Qty: 90 TABLET | Refills: 3 | Status: SHIPPED | OUTPATIENT
Start: 2023-07-26

## 2023-07-26 RX ORDER — NEBIVOLOL 20 MG/1
TABLET ORAL
Qty: 90 TABLET | Refills: 3 | Status: SHIPPED | OUTPATIENT
Start: 2023-07-26

## 2023-07-26 NOTE — PATIENT INSTRUCTIONS
Thank you for letting me take care of your problems this past 4 years.  If I may be of any further service please let me know continue your follow-up with Dr. Sandoval.

## 2023-08-17 DIAGNOSIS — Z17.0 MALIGNANT NEOPLASM OF UPPER-INNER QUADRANT OF RIGHT BREAST IN FEMALE, ESTROGEN RECEPTOR POSITIVE: Primary | ICD-10-CM

## 2023-08-17 DIAGNOSIS — C50.211 MALIGNANT NEOPLASM OF UPPER-INNER QUADRANT OF RIGHT BREAST IN FEMALE, ESTROGEN RECEPTOR POSITIVE: Primary | ICD-10-CM

## 2023-08-18 RX ORDER — LETROZOLE 2.5 MG/1
2.5 TABLET, FILM COATED ORAL DAILY
Qty: 90 TABLET | Refills: 3 | Status: SHIPPED | OUTPATIENT
Start: 2023-08-18

## 2023-08-23 ENCOUNTER — LAB (OUTPATIENT)
Dept: LAB | Facility: HOSPITAL | Age: 78
End: 2023-08-23
Payer: MEDICARE

## 2023-08-23 LAB
ALBUMIN SERPL-MCNC: 4.3 G/DL (ref 3.5–5.2)
ALBUMIN/GLOB SERPL: 1.5 G/DL
ALP SERPL-CCNC: 58 U/L (ref 39–117)
ALT SERPL W P-5'-P-CCNC: 7 U/L (ref 1–33)
ANION GAP SERPL CALCULATED.3IONS-SCNC: 13 MMOL/L (ref 5–15)
AST SERPL-CCNC: 13 U/L (ref 1–32)
BASOPHILS # BLD AUTO: 0.02 10*3/MM3 (ref 0–0.2)
BASOPHILS NFR BLD AUTO: 0.3 % (ref 0–1.5)
BILIRUB SERPL-MCNC: 1 MG/DL (ref 0–1.2)
BUN SERPL-MCNC: 12 MG/DL (ref 8–23)
BUN/CREAT SERPL: 12.5 (ref 7–25)
CALCIUM SPEC-SCNC: 9.6 MG/DL (ref 8.6–10.5)
CEA SERPL-MCNC: 2.13 NG/ML
CHLORIDE SERPL-SCNC: 103 MMOL/L (ref 98–107)
CO2 SERPL-SCNC: 25 MMOL/L (ref 22–29)
CREAT SERPL-MCNC: 0.96 MG/DL (ref 0.57–1)
DEPRECATED RDW RBC AUTO: 45.4 FL (ref 37–54)
EGFRCR SERPLBLD CKD-EPI 2021: 61.4 ML/MIN/1.73
EOSINOPHIL # BLD AUTO: 0.13 10*3/MM3 (ref 0–0.4)
EOSINOPHIL NFR BLD AUTO: 1.9 % (ref 0.3–6.2)
ERYTHROCYTE [DISTWIDTH] IN BLOOD BY AUTOMATED COUNT: 13.8 % (ref 12.3–15.4)
GLOBULIN UR ELPH-MCNC: 2.9 GM/DL
GLUCOSE SERPL-MCNC: 114 MG/DL (ref 65–99)
HCT VFR BLD AUTO: 40.9 % (ref 34–46.6)
HGB BLD-MCNC: 13.3 G/DL (ref 12–15.9)
IMM GRANULOCYTES # BLD AUTO: 0.02 10*3/MM3 (ref 0–0.05)
IMM GRANULOCYTES NFR BLD AUTO: 0.3 % (ref 0–0.5)
LYMPHOCYTES # BLD AUTO: 1.84 10*3/MM3 (ref 0.7–3.1)
LYMPHOCYTES NFR BLD AUTO: 26.6 % (ref 19.6–45.3)
MAGNESIUM SERPL-MCNC: 1.6 MG/DL (ref 1.6–2.4)
MCH RBC QN AUTO: 29.4 PG (ref 26.6–33)
MCHC RBC AUTO-ENTMCNC: 32.5 G/DL (ref 31.5–35.7)
MCV RBC AUTO: 90.3 FL (ref 79–97)
MONOCYTES # BLD AUTO: 0.49 10*3/MM3 (ref 0.1–0.9)
MONOCYTES NFR BLD AUTO: 7.1 % (ref 5–12)
NEUTROPHILS NFR BLD AUTO: 4.41 10*3/MM3 (ref 1.7–7)
NEUTROPHILS NFR BLD AUTO: 63.8 % (ref 42.7–76)
NRBC BLD AUTO-RTO: 0 /100 WBC (ref 0–0.2)
PHOSPHATE SERPL-MCNC: 4.3 MG/DL (ref 2.5–4.5)
PLATELET # BLD AUTO: 237 10*3/MM3 (ref 140–450)
PMV BLD AUTO: 8.9 FL (ref 6–12)
POTASSIUM SERPL-SCNC: 3.6 MMOL/L (ref 3.5–5.2)
PROT SERPL-MCNC: 7.2 G/DL (ref 6–8.5)
RBC # BLD AUTO: 4.53 10*6/MM3 (ref 3.77–5.28)
SODIUM SERPL-SCNC: 141 MMOL/L (ref 136–145)
WBC NRBC COR # BLD: 6.91 10*3/MM3 (ref 3.4–10.8)

## 2023-08-23 PROCEDURE — 82378 CARCINOEMBRYONIC ANTIGEN: CPT | Performed by: NURSE PRACTITIONER

## 2023-08-23 PROCEDURE — 85025 COMPLETE CBC W/AUTO DIFF WBC: CPT | Performed by: NURSE PRACTITIONER

## 2023-08-23 PROCEDURE — 84100 ASSAY OF PHOSPHORUS: CPT | Performed by: NURSE PRACTITIONER

## 2023-08-23 PROCEDURE — 83735 ASSAY OF MAGNESIUM: CPT | Performed by: NURSE PRACTITIONER

## 2023-08-23 PROCEDURE — 86300 IMMUNOASSAY TUMOR CA 15-3: CPT | Performed by: NURSE PRACTITIONER

## 2023-08-23 PROCEDURE — 80053 COMPREHEN METABOLIC PANEL: CPT | Performed by: NURSE PRACTITIONER

## 2023-08-24 LAB — CANCER AG27-29 SERPL-ACNC: 25.9 U/ML (ref 0–38.6)

## 2023-08-30 ENCOUNTER — OFFICE VISIT (OUTPATIENT)
Dept: ONCOLOGY | Facility: CLINIC | Age: 78
End: 2023-08-30
Payer: MEDICARE

## 2023-08-30 VITALS
SYSTOLIC BLOOD PRESSURE: 124 MMHG | RESPIRATION RATE: 16 BRPM | OXYGEN SATURATION: 97 % | DIASTOLIC BLOOD PRESSURE: 86 MMHG | TEMPERATURE: 97 F | HEIGHT: 61 IN | HEART RATE: 68 BPM | WEIGHT: 188.4 LBS | BODY MASS INDEX: 35.57 KG/M2

## 2023-08-30 DIAGNOSIS — M85.851 OSTEOPENIA OF NECK OF RIGHT FEMUR: ICD-10-CM

## 2023-08-30 DIAGNOSIS — N18.2 STAGE 2 CHRONIC KIDNEY DISEASE: Primary | ICD-10-CM

## 2023-08-30 DIAGNOSIS — Z85.3 HISTORY OF BREAST CANCER: ICD-10-CM

## 2023-08-30 DIAGNOSIS — Z79.811 LONG TERM (CURRENT) USE OF AROMATASE INHIBITORS: ICD-10-CM

## 2023-08-30 NOTE — PROGRESS NOTES
[facility] ON[facility] HEMATOLOGY & ONCOLOGY  [address]  [facility] [address] [phone]    Patient Name: Cee Quiles  Encounter Date: 08/30/2023  YOB: 1945  Patient Number: [id number]      REASON FOR FOLLOW-UP: Cee Quiles is a pleasant 78 y.o.  female who is seen on followup for Stage IA right breast cancer, receptor positive, and HER-2/jeremy negative.  Low recurrence score of 6, absolute benefit of chemo is less than 1%.   She is on adjuvant letrozole.  She is also seen for anemia from iron deficiency and chronic kidney disease.      Pt had colonoscopy on 08/29/22.  Biopsy with benign colonic mucosa.  Histologic changes of an adenomatous polyp or hyperplastic polyp are not identified.  Adenomatous polyp, tubular type.  No high-grade dysplasia identified.       INTERVAL HISTORY     Pt presents to clinic today for continued follow up.  She has no acute complaints today.  She has osteopenia and has been taking Femara which she is tolerating well.     She had labs drawn and results were reviewed with her in office       Oncology/Hematology History Overview Note   DIAGNOSTIC ABNORMALITIES:  The patient was found to have a right breast mass by mammogram at [facility].   Right breast core biopsy 01/17/2019 showed infiltrating lobular carcinoma, grade 1. Greatest dimension of tumor measured 11.1 mm. ER 98%, NE 92%, HER-2/jeremy +1 and negative FISH, and Ki-67 at 7%,. Oncotype DX report. Low recurrence score of 6, absolute benefit of chemo is less than 1%  Breast MRI [facility] 01/30/2019, 1.8 x 0.7 x 0.6 cm, non mass-like linear enhancement at 1 o'clock. Second suspicious area of enhancement in the right breast just behind the nipple measuring 1 x 1 x 0.9 cm. Two areas of linear non mass-like enhancement has anterior depth at 1 o'clock in the left breast.   The patient was seen by Dr. Hodges 02/01/2019.  Planned for bilateral mastectomy and sentinel lymph node evaluation.   Ultrasound biopsy left breast showed atypical lobular hyperplasia. No histologic evidence of malignancy.   The patient had a followup with Dr. Hodges 02/13/2019.  CBC 02/13/2019 revealed a WBC of 10.6, hemoglobin 12.2, hematocrit 36.8, MCV 86.4, platelet count 211,000 with ANC of 9.4. CMP remarkable for a GFR of 46 mL/minute.  Pathology report 02/15/2019 left breast showed no evidence of in situ or invasive carcinoma. Right breast showed an infiltrating lobular carcinoma, grade 1 with residual 8 mm.       PREVIOUS INTERVENTIONS:   Bilateral mastectomy with right breast sentinel lymph node biopsy on 02/12/2019.  Adjuvant Femara 2.5 mg p.o. daily 03/22/2019 through present.      PREVIOUS INTERVENTIONS:  Ferrous sulfate 325 mg daily 04/22/2020 through 05/20/2020.  Resume 07/17/2020 through 08/20/2020.     Breast CA   2/12/2019 Initial Diagnosis    Breast CA (CMS/HCC)     Malignant neoplasm of upper-inner quadrant of right breast in female, estrogen receptor positive   4/23/2020 Initial Diagnosis    Malignant neoplasm of upper-inner quadrant of right breast in female, estrogen receptor positive (CMS/HCC)     4/24/2020 -  Chemotherapy    OP SUPPORTIVE Denosumab (Prolia) Q6M           PAST MEDICAL HISTORY:  ALLERGIES:  Allergies   Allergen Reactions    Keflex [Cephalexin] Anaphylaxis    Lortab [Hydrocodone-Acetaminophen] Nausea Only     CURRENT MEDICATIONS:  Outpatient Encounter Medications as of 8/30/2023   Medication Sig Dispense Refill    amLODIPine (NORVASC) 5 MG tablet       atorvastatin (LIPITOR) 40 MG tablet TAKE 1 TABLET BY MOUTH  DAILY 90 tablet 3    calcium citrate-vitamin d (CALCITRATE) 315-250 MG-UNIT tablet tablet Take  by mouth Daily.      Denosumab (PROLIA SC) Inject  under the skin into the appropriate area as directed.      furosemide (LASIX) 20 MG tablet Take 1 tablet by mouth 2 (Two) Times a Day.      glipizide (GLUCOTROL XL) 2.5 MG  24 hr tablet TAKE 1 TABLET BY MOUTH  DAILY 90 tablet 3    hydroCHLOROthiazide (HYDRODIURIL) 25 MG tablet TAKE 1 TABLET BY MOUTH  DAILY 90 tablet 3    letrozole (FEMARA) 2.5 MG tablet TAKE 1 TABLET BY MOUTH DAILY 90 tablet 3    levothyroxine (SYNTHROID, LEVOTHROID) 100 MCG tablet TAKE 1 TABLET BY MOUTH  DAILY 90 tablet 3    Magnesium Oxide -Mg Supplement 400 MG capsule Take 1 capsule by mouth Every 12 (Twelve) Hours.      meclizine (ANTIVERT) 25 MG tablet Take 1 tablet by mouth 3 (Three) Times a Day As Needed for Dizziness.      nebivolol (BYSTOLIC) 20 MG tablet TAKE 1 TABLET BY MOUTH  DAILY 90 tablet 3     No facility-administered encounter medications on file as of 8/30/2023.     ADULT ILLNESSES:  Patient Active Problem List   Diagnosis Code    Type 2 diabetes mellitus with diabetic chronic kidney disease E11.22    Essential hypertension I10    Acquired hypothyroidism E03.9    Chronic pain of right knee M25.561, G89.29    CKD (chronic kidney disease) stage 3, GFR 30-59 ml/min N18.30    Mixed hyperlipidemia E78.2    Morbidly obese E66.01    Breast CA C50.919    Postmenopausal Z78.0    Hypomagnesemia E83.42    Osteopenia of neck of femur M85.859    Malignant neoplasm of upper-inner quadrant of right breast in female, estrogen receptor positive C50.211, Z17.0    Long term (current) use of aromatase inhibitors Z79.811    Hx of adenomatous colonic polyps Z86.010     SURGERIES:  Past Surgical History:   Procedure Laterality Date    CHOLECYSTECTOMY      COLONOSCOPY N/A 8/29/2022    Procedure: COLONOSCOPY WITH ANESTHESIA;  Surgeon: Rico Hilton MD;  Location: [facility] ENDOSCOPY;  Service: Gastroenterology;  Laterality: N/A;  pre hx colon polyp  post polyp  dr rusty lira    COLONOSCOPY W/ POLYPECTOMY  02/08/2012    Pedunculated polyp cecal pit, Hyperplastic polyp at 15 cm repeat exam in 5 years    COLONOSCOPY W/ POLYPECTOMY  07/31/2017    Tubular adenoma hepatic flexure repeat exam in 5 years    KNEE SURGERY Right      MASTECTOMY W/ SENTINEL NODE BIOPSY Bilateral 02/12/2019    Procedure: BILATERAL MASTECTOMY WITH RIGHT BREAST SENTINEL LYMPH NODE BIOPSY - RADIOLOGIST WILL INJECT;  Surgeon: Michael Hodges MD;  Location: [facility] OR;  Service: General    OVARIAN CYST DRAINAGE Left     TUMOR REMOVAL Left      HEALTH MAINTENANCE ITEMS:  Health Maintenance Due   Topic Date Due    TDAP/TD VACCINES (1 - Tdap) Never done    ZOSTER VACCINE (1 of 2) Never done    Pneumococcal Vaccine 65+ (2 - PPSV23 or PCV20) 01/13/2020    COVID-19 Vaccine (3 - Moderna series) 05/17/2021    URINE MICROALBUMIN  03/02/2023       <no information>  Last Completed Colonoscopy            COLORECTAL CANCER SCREENING (COLONOSCOPY - Every 5 Years) Next due on 8/29/2027 08/29/2022  COLONOSCOPY    08/29/2022  Surgical Procedure: COLONOSCOPY    04/24/2020  Occult Blood X 3, Stool - Stool, Per Rectum    08/14/2017  SCANNED - COLONOSCOPY    02/08/2012  SCANNED - COLONOSCOPY    Only the first 5 history entries have been loaded, but more history exists.                  Immunization History   Administered Date(s) Administered    COVID-19 (MODERNA) 1st,2nd,3rd Dose Monovalent 02/23/2021, 03/22/2021    FLUAD TRI 65YR+ 11/18/2019    Flu Vaccine Quad PF >36MO 11/13/2017    Fluad Quad 65+ 11/18/2019, 09/27/2022    Fluzone High Dose =>65 Years ([employer] ONLY) 10/23/2015, 10/31/2016, 10/31/2018    Fluzone Quad >6mos (Multi-dose) 11/13/2017    Influenza Quad Vaccine (Inpatient) 11/13/2017    Pneumococcal Conjugate 13-Valent (PCV13) 11/18/2019     Last Completed Mammogram       This patient has no relevant Health Maintenance data.              FAMILY HISTORY:  Family History   Problem Relation Age of Onset    Colon cancer Neg Hx     Colon polyps Neg Hx      SOCIAL HISTORY:  Social History     Socioeconomic History    Marital status:    Tobacco Use    Smoking status: Never    Smokeless tobacco: Never   Substance and Sexual Activity    Alcohol use: No    Drug use: No     "Sexual activity: Defer       REVIEW OF SYSTEMS:    Review of Systems   Constitutional:  Negative for chills, fatigue and fever.   HENT:  Negative for congestion, mouth sores and trouble swallowing.    Eyes:  Negative for redness and visual disturbance.   Respiratory:  Negative for cough and shortness of breath.    Cardiovascular:  Negative for chest pain and palpitations.   Gastrointestinal:  Negative for abdominal pain, nausea and vomiting.   Endocrine: Negative for polydipsia and polyphagia.   Genitourinary:  Negative for difficulty urinating, dysuria and flank pain.   Musculoskeletal:  Negative for gait problem and joint swelling.   Skin:  Negative for pallor.   Allergic/Immunologic: Negative for food allergies.   Neurological:  Negative for dizziness, speech difficulty and weakness.   Hematological:  Negative for adenopathy. Does not bruise/bleed easily.   Psychiatric/Behavioral:  Negative for agitation, confusion and hallucinations.      VITAL SIGNS: /86   Pulse 68   Temp 97 øF (36.1 øC)   Resp 16   Ht 154.9 cm (61\")   Wt 85.5 kg (188 lb 6.4 oz)   SpO2 97%   BMI 35.60 kg/mý   Pain Score    08/30/23 1045   PainSc: 0-No pain       PHYSICAL EXAMINATION:     Physical Exam  Vitals reviewed.   Constitutional:       General: She is not in acute distress.  HENT:      Head: Normocephalic and atraumatic.   Eyes:      General: No scleral icterus.  Cardiovascular:      Rate and Rhythm: Normal rate.   Pulmonary:      Effort: No respiratory distress.      Breath sounds: No wheezing or rales.   Chest:      Comments: Bilateral mastectomy scars.   No palpable evidence of reoccurrence    Abdominal:      General: Bowel sounds are normal.      Palpations: Abdomen is soft.   Musculoskeletal:      Cervical back: Neck supple.      Right lower leg: Edema present.      Left lower leg: Edema present.      Comments: Using cane for ambulation    Lymphadenopathy:      Upper Body:      Right upper body: No axillary adenopathy. "      Left upper body: No axillary adenopathy.   Skin:     General: Skin is warm and dry.      Coloration: Skin is not pale.   Neurological:      Mental Status: She is alert and oriented to person, place, and time.   Psychiatric:         Mood and Affect: Mood normal.         Behavior: Behavior normal.       LABS    Lab Results - Last 18 Months   Lab Units 08/23/23  1028 03/13/23  0837 02/22/23  0854 08/04/22  0934 04/06/22  1324 03/02/22  1256   HEMOGLOBIN g/dL 13.3 12.9 13.2 12.8 13.3 13.1   HEMATOCRIT % 40.9 37.8 41.2 38.3 38.6 39.5   MCV fL 90.3 86.9 90.5 84.2 83.4 83.3   WBC 10*3/mm3 6.91 7.73 8.48 8.03 7.47 7.71   RDW % 13.8 13.3 13.6 13.6 13.7 14.3   MPV fL 8.9  --  8.8 9.4 9.2  --    PLATELETS 10*3/mm3 237 257 282 250 267 273   IMM GRAN % % 0.3  --  0.5 0.5 0.5  --    NEUTROS ABS 10*3/mm3 4.41 5.25 5.08 5.28 4.77 5.04   LYMPHS ABS 10*3/mm3 1.84 1.69 2.32 1.73 1.80 1.82   MONOS ABS 10*3/mm3 0.49 0.55 0.68 0.61 0.52 0.59   EOS ABS 10*3/mm3 0.13 0.18 0.32 0.34 0.31 0.20   BASOS ABS 10*3/mm3 0.02 0.03 0.04 0.03 0.03 0.04   IMMATURE GRANS (ABS) 10*3/mm3 0.02  --  0.04 0.04 0.04  --    NRBC /100 WBC 0.0 0.0 0.0 0.0 0.0 0.0       Lab Results - Last 18 Months   Lab Units 08/23/23  1028 04/28/23  1242 03/13/23  0837 02/22/23  0854 10/28/22  1306 09/08/22  0811 08/04/22  0934 04/29/22  1411   GLUCOSE mg/dL 114* 121* 103* 114* 122* 115* 111* 113*   SODIUM mmol/L 141 140 140 137 137 137 137 141   POTASSIUM mmol/L 3.6 3.5 3.8 4.3 3.3* 3.7 3.5 3.2*   CO2 mmol/L 25.0 28.0 25.4 26.0 31.0* 30.0* 27.0 27.0   CHLORIDE mmol/L 103 98 102 100 96* 96* 99 99   ANION GAP mmol/L 13.0 14.0  --  11.0 10.0  --  11.0 15.0   CREATININE mg/dL 0.96 1.20* 0.97 1.04* 1.18* 0.96 1.16* 1.53*   BUN mg/dL 12 21 11 19 17 15 13 23   BUN / CREAT RATIO  12.5 17.5 11.3 18.3 14.4 15.6 11.2 15.0   CALCIUM mg/dL 9.6 10.9* 10.0 10.3 10.5 9.9 8.9 10.3   ALK PHOS U/L 58 74 73 78 79 65 75 77   TOTAL PROTEIN g/dL 7.2 7.3  --  7.5 7.3  --  7.0 7.0   ALT (SGPT)  U/L 7 10 10 12 9 13 6 13   AST (SGOT) U/L 13 15 15 16 13 22 13 16   BILIRUBIN mg/dL 1.0 0.8 0.8 0.5 0.7 0.8 0.6 0.8   ALBUMIN g/dL 4.3 4.3 4.2 4.4 4.10 4.30 4.00 4.00   GLOBULIN gm/dL 2.9 3.0  --  3.1 3.2  --  3.0 3.0       Lab Results - Last 18 Months   Lab Units 08/23/23  1028 03/13/23  0837 02/22/23  0854 08/04/22  0934 04/06/22  1324   CEA ng/mL 2.13 2.5 2.04 1.13 1.04       Lab Results - Last 18 Months   Lab Units 03/02/22  1256   TSH uIU/mL 3.220       Cee Quiles reports a pain score of 0.  No intervention indicated.       ASSESSMENT:  1. Stage 2 chronic kidney disease    2. Long term (current) use of aromatase inhibitors    3. History of breast cancer    4. Osteopenia of neck of right femur        1.   History of Carcinoma of the right breast, infiltrating lobular.    - Low recurrence score of 6, absolute benefit of chemo is less than 1%:  Current Stage: AJCC Stage IA (T1c N0, M0, G1)   Tumor size 1.91 cm.  Hormone Status: ER 98%, NV 92%, HER-2/jeremy negative by FISH.  Baseline Node Status: 0/2 positive.  Treatment status: Post bilateral mastectomy and right sentinel node biopsy.  On adjuvant Femara 2.5 mg 03/22/2019 through present.  Plan for 10 years.   CA 27-29 is 25.9 and CEA is 2.13  -Breast exam today unremarkable for palpable masses or evidence of recurrence   -Had breast exam at Dr. Hodges in July 2.  Chronic kidney disease Stage II  -BUN 12, Creatinine 0.96, GFR 61.4  -Hemoglobin 13.3, Hematocrit 40.9  -Continue follow up with [facility]    3.   Osteopenia   4. Long term use of AI  - Taking Femara 2.5 mg 03/22/2019 through present.  Plan for 10 years.   -No s/s of toxicity.  Monitor for hot flashes,osteonecrosis  -On denosumab (Prolia) q 6 months.   -Last prolia April 28, 2023  -DEXA April 5, 2023: Femoral neck T Score -1.1, Lumbar Spine T Score 2.1        PLAN:  Stable for observation  Schedule denosumab 60 mg subcutaneously every 6 months  -Bone density improved and  still on Femara.  Monitor for osteonecrosis of the jaw.  -Continue ongoing management per primary care physician and other specialists.  Return to office in 6 months with labs one week before appt with CBC with differential, CEA, CA27-29, and CMP, Mag, Phos  Plan of care discussed with patient.  Understanding expressed.  Patient agreeable to proceed.      Advance Care Planning   ACP discussion was declined by the patient. Patient does not have an advance directive, information provided.    Shirley Milner, APRN  08/30/2023

## 2023-09-14 ENCOUNTER — OFFICE VISIT (OUTPATIENT)
Dept: FAMILY MEDICINE CLINIC | Facility: CLINIC | Age: 78
End: 2023-09-14
Payer: MEDICARE

## 2023-09-14 VITALS
RESPIRATION RATE: 18 BRPM | BODY MASS INDEX: 34.93 KG/M2 | SYSTOLIC BLOOD PRESSURE: 126 MMHG | HEIGHT: 61 IN | DIASTOLIC BLOOD PRESSURE: 70 MMHG | TEMPERATURE: 97.8 F | OXYGEN SATURATION: 97 % | WEIGHT: 185 LBS | HEART RATE: 68 BPM

## 2023-09-14 DIAGNOSIS — I10 ESSENTIAL HYPERTENSION: Primary | ICD-10-CM

## 2023-09-14 DIAGNOSIS — R79.9 ABNORMAL FINDING OF BLOOD CHEMISTRY, UNSPECIFIED: ICD-10-CM

## 2023-09-14 PROCEDURE — 1170F FXNL STATUS ASSESSED: CPT | Performed by: FAMILY MEDICINE

## 2023-09-14 PROCEDURE — 3078F DIAST BP <80 MM HG: CPT | Performed by: FAMILY MEDICINE

## 2023-09-14 PROCEDURE — G0439 PPPS, SUBSEQ VISIT: HCPCS | Performed by: FAMILY MEDICINE

## 2023-09-14 PROCEDURE — 3074F SYST BP LT 130 MM HG: CPT | Performed by: FAMILY MEDICINE

## 2023-09-14 NOTE — PROGRESS NOTES
The ABCs of the Annual Wellness Visit  Subsequent Medicare Wellness Visit    Subjective      Cee Quiles is a 78 y.o. female who presents for a Subsequent Medicare Wellness Visit.    The following portions of the patient's history were reviewed and   updated as appropriate: allergies, current medications, past family history, past medical history, past social history, past surgical history, and problem list.    Compared to one year ago, the patient feels her physical   health is the same.    Compared to one year ago, the patient feels her mental   health is the same.    Recent Hospitalizations:  She was not admitted to the hospital during the last year.       Current Medical Providers:  Patient Care Team:  Neville Sandoval MD as PCP - General  Andrea Cartwright MD as Consulting Physician (Hematology and Oncology)  Michael Hodges MD as Surgeon (General Surgery)    Outpatient Medications Prior to Visit   Medication Sig Dispense Refill    amLODIPine (NORVASC) 5 MG tablet       atorvastatin (LIPITOR) 40 MG tablet TAKE 1 TABLET BY MOUTH  DAILY 90 tablet 3    calcium citrate-vitamin d (CALCITRATE) 315-250 MG-UNIT tablet tablet Take  by mouth Daily.      Denosumab (PROLIA SC) Inject  under the skin into the appropriate area as directed.      furosemide (LASIX) 20 MG tablet Take 1 tablet by mouth 2 (Two) Times a Day.      glipizide (GLUCOTROL XL) 2.5 MG 24 hr tablet TAKE 1 TABLET BY MOUTH  DAILY 90 tablet 3    hydroCHLOROthiazide (HYDRODIURIL) 25 MG tablet TAKE 1 TABLET BY MOUTH  DAILY 90 tablet 3    letrozole (FEMARA) 2.5 MG tablet TAKE 1 TABLET BY MOUTH DAILY 90 tablet 3    levothyroxine (SYNTHROID, LEVOTHROID) 100 MCG tablet TAKE 1 TABLET BY MOUTH  DAILY 90 tablet 3    Magnesium Oxide -Mg Supplement 400 MG capsule Take 1 capsule by mouth Every 12 (Twelve) Hours.      meclizine (ANTIVERT) 25 MG tablet Take 1 tablet by mouth 3 (Three) Times a Day As Needed for Dizziness.      nebivolol (BYSTOLIC) 20 MG tablet  "TAKE 1 TABLET BY MOUTH  DAILY 90 tablet 3     No facility-administered medications prior to visit.       No opioid medication identified on active medication list. I have reviewed chart for other potential  high risk medication/s and harmful drug interactions in the elderly.        Aspirin is not on active medication list.  Aspirin use is not indicated based on review of current medical condition/s. Risk of harm outweighs potential benefits.  .    Patient Active Problem List   Diagnosis    Type 2 diabetes mellitus with diabetic chronic kidney disease    Essential hypertension    Acquired hypothyroidism    Chronic pain of right knee    CKD (chronic kidney disease) stage 3, GFR 30-59 ml/min    Mixed hyperlipidemia    Morbidly obese    Breast CA    Postmenopausal    Hypomagnesemia    Osteopenia of neck of femur    Malignant neoplasm of upper-inner quadrant of right breast in female, estrogen receptor positive    Long term (current) use of aromatase inhibitors    Hx of adenomatous colonic polyps     Advance Care Planning   Advance Care Planning     Advance Directive is not on file.  ACP discussion was held with the patient during this visit. Patient does not have an advance directive, information provided.     Objective    Vitals:    09/14/23 1005   BP: 126/70   Pulse: 68   Resp: 18   Temp: 97.8 °F (36.6 °C)   SpO2: 97%   Weight: 83.9 kg (185 lb)   Height: 154.9 cm (60.98\")     Estimated body mass index is 34.97 kg/m² as calculated from the following:    Height as of this encounter: 154.9 cm (60.98\").    Weight as of this encounter: 83.9 kg (185 lb).           Does the patient have evidence of cognitive impairment?   No            HEALTH RISK ASSESSMENT    Smoking Status:  Social History     Tobacco Use   Smoking Status Never   Smokeless Tobacco Never     Alcohol Consumption:  Social History     Substance and Sexual Activity   Alcohol Use No     Fall Risk Screen:    STEADI Fall Risk Assessment was completed, and patient " is at LOW risk for falls.Assessment completed on:2023    Depression Screenin/14/2023    10:06 AM   PHQ-2/PHQ-9 Depression Screening   Little Interest or Pleasure in Doing Things 0-->not at all   Feeling Down, Depressed or Hopeless 0-->not at all   PHQ-9: Brief Depression Severity Measure Score 0       Health Habits and Functional and Cognitive Screenin/14/2023    10:00 AM   Functional & Cognitive Status   Do you have difficulty preparing food and eating? No   Do you have difficulty bathing yourself, getting dressed or grooming yourself? No   Do you have difficulty using the toilet? No   Do you have difficulty moving around from place to place? No   Do you have trouble with steps or getting out of a bed or a chair? No   Current Diet Well Balanced Diet   Dental Exam Up to date   Eye Exam Up to date   Exercise (times per week) 3 times per week   Current Exercises Include House Cleaning   Do you need help using the phone?  No   Are you deaf or do you have serious difficulty hearing?  No   Do you need help to go to places out of walking distance? No   Do you need help shopping? No   Do you need help preparing meals?  No   Do you need help with housework?  No   Do you need help with laundry? No   Do you need help taking your medications? No   Do you need help managing money? No   Do you ever drive or ride in a car without wearing a seat belt? No   Have you felt unusual stress, anger or loneliness in the last month? No   Who do you live with? Spouse   If you need help, do you have trouble finding someone available to you? No   Have you been bothered in the last four weeks by sexual problems? No   Do you have difficulty concentrating, remembering or making decisions? No       Age-appropriate Screening Schedule:  Refer to the list below for future screening recommendations based on patient's age, sex and/or medical conditions. Orders for these recommended tests are listed in the plan section. The  patient has been provided with a written plan.    Health Maintenance   Topic Date Due    TDAP/TD VACCINES (1 - Tdap) Never done    ZOSTER VACCINE (1 of 2) Never done    Pneumococcal Vaccine 65+ (2 - PPSV23 or PCV20) 01/13/2020    COVID-19 Vaccine (3 - Moderna series) 05/17/2021    URINE MICROALBUMIN  03/02/2023    ANNUAL WELLNESS VISIT  09/08/2023    LIPID PANEL  09/08/2023    HEMOGLOBIN A1C  09/15/2023    INFLUENZA VACCINE  10/01/2023    BMI FOLLOWUP  03/15/2024    DIABETIC EYE EXAM  07/13/2024    DXA SCAN  04/05/2025    COLORECTAL CANCER SCREENING  08/29/2027    HEPATITIS C SCREENING  Completed                  CMS Preventative Services Quick Reference  Risk Factors Identified During Encounter:    Fall Risk-High or Moderate: Discussed Fall Prevention in the home    The above risks/problems have been discussed with the patient.  Pertinent information has been shared with the patient in the After Visit Summary.    Diagnoses and all orders for this visit:    1. Essential hypertension (Primary)  -     CBC & Differential  -     Comprehensive metabolic panel  -     Lipid Panel With / Chol / HDL Ratio  -     Hemoglobin A1c  -     Microalbumin / Creatinine Urine Ratio - Urine, Clean Catch    2. Abnormal finding of blood chemistry, unspecified  -     Hemoglobin A1c        Follow Up:   Next Medicare Wellness visit to be scheduled in 1 year.      An After Visit Summary and PPPS were made available to the patient.

## 2023-09-14 NOTE — PROGRESS NOTES
Subjective   Cee Quiles is a 78 y.o. female.     Chief Complaint   Patient presents with    Medicare Wellness-subsequent    Hypertension        Hypertension       She is noting good bp conto lwithout cp o rha      Current Outpatient Medications:     amLODIPine (NORVASC) 5 MG tablet, , Disp: , Rfl:     atorvastatin (LIPITOR) 40 MG tablet, TAKE 1 TABLET BY MOUTH  DAILY, Disp: 90 tablet, Rfl: 3    calcium citrate-vitamin d (CALCITRATE) 315-250 MG-UNIT tablet tablet, Take  by mouth Daily., Disp: , Rfl:     Denosumab (PROLIA SC), Inject  under the skin into the appropriate area as directed., Disp: , Rfl:     furosemide (LASIX) 20 MG tablet, Take 1 tablet by mouth 2 (Two) Times a Day., Disp: , Rfl:     glipizide (GLUCOTROL XL) 2.5 MG 24 hr tablet, TAKE 1 TABLET BY MOUTH  DAILY, Disp: 90 tablet, Rfl: 3    hydroCHLOROthiazide (HYDRODIURIL) 25 MG tablet, TAKE 1 TABLET BY MOUTH  DAILY, Disp: 90 tablet, Rfl: 3    letrozole (FEMARA) 2.5 MG tablet, TAKE 1 TABLET BY MOUTH DAILY, Disp: 90 tablet, Rfl: 3    levothyroxine (SYNTHROID, LEVOTHROID) 100 MCG tablet, TAKE 1 TABLET BY MOUTH  DAILY, Disp: 90 tablet, Rfl: 3    Magnesium Oxide -Mg Supplement 400 MG capsule, Take 1 capsule by mouth Every 12 (Twelve) Hours., Disp: , Rfl:     meclizine (ANTIVERT) 25 MG tablet, Take 1 tablet by mouth 3 (Three) Times a Day As Needed for Dizziness., Disp: , Rfl:     nebivolol (BYSTOLIC) 20 MG tablet, TAKE 1 TABLET BY MOUTH  DAILY, Disp: 90 tablet, Rfl: 3  Allergies   Allergen Reactions    Keflex [Cephalexin] Anaphylaxis    Lortab [Hydrocodone-Acetaminophen] Nausea Only              Facility age limit for growth percentiles is 20 years.    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, left     WRIST    Cancer     LOBULAR RIGHT BREAST    Chronic knee pain     BILATERAL    Colon polyp     Cyst of ovary, left     Diabetes     Diabetes mellitus     Dizziness     Elevated cholesterol      "Gallstones     Hx of colonic polyps     Hyperlipidemia     Hypertension     Hypertension     Hypothyroid     Kidney disease     Long term (current) use of aromatase inhibitors 4/23/2020    Malignant neoplasm of upper-inner quadrant of right breast in female, estrogen receptor positive 4/23/2020    Osteopenia of neck of femur 4/23/2020    PONV (postoperative nausea and vomiting)     Torn meniscus      Past Surgical History:   Procedure Laterality Date    CHOLECYSTECTOMY      COLONOSCOPY N/A 8/29/2022    Procedure: COLONOSCOPY WITH ANESTHESIA;  Surgeon: Rico Hilton MD;  Location: [facility] ENDOSCOPY;  Service: Gastroenterology;  Laterality: N/A;  pre hx colon polyp  post polyp  dr rusty lira    COLONOSCOPY W/ POLYPECTOMY  02/08/2012    Pedunculated polyp cecal pit, Hyperplastic polyp at 15 cm repeat exam in 5 years    COLONOSCOPY W/ POLYPECTOMY  07/31/2017    Tubular adenoma hepatic flexure repeat exam in 5 years    KNEE SURGERY Right     MASTECTOMY W/ SENTINEL NODE BIOPSY Bilateral 02/12/2019    Procedure: BILATERAL MASTECTOMY WITH RIGHT BREAST SENTINEL LYMPH NODE BIOPSY - RADIOLOGIST WILL INJECT;  Surgeon: Michael Hodges MD;  Location: [facility] OR;  Service: General    OVARIAN CYST DRAINAGE Left     TUMOR REMOVAL Left        Review of Systems   Constitutional: Negative.    HENT: Negative.     Eyes: Negative.    Respiratory: Negative.     Cardiovascular: Negative.    Gastrointestinal: Negative.    Endocrine: Negative.    Genitourinary: Negative.    Musculoskeletal: Negative.    Skin: Negative.    Allergic/Immunologic: Negative.    Neurological: Negative.    Hematological: Negative.    Psychiatric/Behavioral: Negative.       Objective /70   Pulse 68   Temp 97.8 °F (36.6 °C)   Resp 18   Ht 154.9 cm (60.98\")   Wt 83.9 kg (185 lb)   SpO2 97%   BMI 34.97 kg/m²    Physical Exam  Vitals and nursing note reviewed.   Constitutional:       Appearance: Normal appearance. She is normal weight.   HENT:      " Head: Normocephalic and atraumatic.      Nose: Nose normal.      Mouth/Throat:      Mouth: Mucous membranes are moist.   Eyes:      Pupils: Pupils are equal, round, and reactive to light.   Cardiovascular:      Rate and Rhythm: Normal rate and regular rhythm.      Pulses: Normal pulses.      Heart sounds: Normal heart sounds.   Pulmonary:      Effort: Pulmonary effort is normal.      Breath sounds: Normal breath sounds.   Abdominal:      General: Abdomen is flat.      Palpations: Abdomen is soft.   Musculoskeletal:         General: Normal range of motion.      Cervical back: Normal range of motion and neck supple.   Skin:     General: Skin is warm.      Capillary Refill: Capillary refill takes less than 2 seconds.   Neurological:      General: No focal deficit present.      Mental Status: She is alert.   Psychiatric:         Mood and Affect: Mood normal.       Assessment & Plan   Diagnoses and all orders for this visit:    1. Essential hypertension (Primary)                 No orders of the defined types were placed in this encounter.      Follow up: 5 month(s)

## 2023-09-15 LAB
ALBUMIN SERPL-MCNC: 4.7 G/DL (ref 3.5–5.2)
ALBUMIN/CREAT UR: 21 MG/G CREAT (ref 0–29)
ALBUMIN/GLOB SERPL: 1.8 G/DL
ALP SERPL-CCNC: 63 U/L (ref 39–117)
ALT SERPL-CCNC: 11 U/L (ref 1–33)
AST SERPL-CCNC: 14 U/L (ref 1–32)
BASOPHILS # BLD AUTO: 0.03 10*3/MM3 (ref 0–0.2)
BASOPHILS NFR BLD AUTO: 0.4 % (ref 0–1.5)
BILIRUB SERPL-MCNC: 1.1 MG/DL (ref 0–1.2)
BUN SERPL-MCNC: 13 MG/DL (ref 8–23)
BUN/CREAT SERPL: 12.4 (ref 7–25)
CALCIUM SERPL-MCNC: 10.5 MG/DL (ref 8.6–10.5)
CHLORIDE SERPL-SCNC: 99 MMOL/L (ref 98–107)
CHOLEST SERPL-MCNC: 138 MG/DL (ref 0–200)
CHOLEST/HDLC SERPL: 3.21 {RATIO}
CO2 SERPL-SCNC: 27.9 MMOL/L (ref 22–29)
CREAT SERPL-MCNC: 1.05 MG/DL (ref 0.57–1)
CREAT UR-MCNC: 33.1 MG/DL
EGFRCR SERPLBLD CKD-EPI 2021: 55.2 ML/MIN/1.73
EOSINOPHIL # BLD AUTO: 0.16 10*3/MM3 (ref 0–0.4)
EOSINOPHIL NFR BLD AUTO: 1.9 % (ref 0.3–6.2)
ERYTHROCYTE [DISTWIDTH] IN BLOOD BY AUTOMATED COUNT: 14.2 % (ref 12.3–15.4)
GLOBULIN SER CALC-MCNC: 2.6 GM/DL
GLUCOSE SERPL-MCNC: 109 MG/DL (ref 65–99)
HBA1C MFR BLD: 5.4 % (ref 4.8–5.6)
HCT VFR BLD AUTO: 42.6 % (ref 34–46.6)
HDLC SERPL-MCNC: 43 MG/DL (ref 40–60)
HGB BLD-MCNC: 14.1 G/DL (ref 12–15.9)
IMM GRANULOCYTES # BLD AUTO: 0.03 10*3/MM3 (ref 0–0.05)
IMM GRANULOCYTES NFR BLD AUTO: 0.4 % (ref 0–0.5)
LDLC SERPL CALC-MCNC: 69 MG/DL (ref 0–100)
LYMPHOCYTES # BLD AUTO: 2.42 10*3/MM3 (ref 0.7–3.1)
LYMPHOCYTES NFR BLD AUTO: 28.6 % (ref 19.6–45.3)
MCH RBC QN AUTO: 30.5 PG (ref 26.6–33)
MCHC RBC AUTO-ENTMCNC: 33.1 G/DL (ref 31.5–35.7)
MCV RBC AUTO: 92.2 FL (ref 79–97)
MICROALBUMIN UR-MCNC: 6.8 UG/ML
MONOCYTES # BLD AUTO: 0.56 10*3/MM3 (ref 0.1–0.9)
MONOCYTES NFR BLD AUTO: 6.6 % (ref 5–12)
NEUTROPHILS # BLD AUTO: 5.27 10*3/MM3 (ref 1.7–7)
NEUTROPHILS NFR BLD AUTO: 62.1 % (ref 42.7–76)
NRBC BLD AUTO-RTO: 0 /100 WBC (ref 0–0.2)
PLATELET # BLD AUTO: 287 10*3/MM3 (ref 140–450)
POTASSIUM SERPL-SCNC: 3.4 MMOL/L (ref 3.5–5.2)
PROT SERPL-MCNC: 7.3 G/DL (ref 6–8.5)
RBC # BLD AUTO: 4.62 10*6/MM3 (ref 3.77–5.28)
SODIUM SERPL-SCNC: 143 MMOL/L (ref 136–145)
TRIGL SERPL-MCNC: 148 MG/DL (ref 0–150)
VLDLC SERPL CALC-MCNC: 26 MG/DL (ref 5–40)
WBC # BLD AUTO: 8.47 10*3/MM3 (ref 3.4–10.8)

## 2023-10-17 DIAGNOSIS — Z79.811 LONG TERM (CURRENT) USE OF AROMATASE INHIBITORS: Primary | ICD-10-CM

## 2023-10-17 DIAGNOSIS — M85.851 OSTEOPENIA OF NECK OF RIGHT FEMUR: ICD-10-CM

## 2023-10-17 DIAGNOSIS — C50.211 MALIGNANT NEOPLASM OF UPPER-INNER QUADRANT OF RIGHT BREAST IN FEMALE, ESTROGEN RECEPTOR POSITIVE: ICD-10-CM

## 2023-10-17 DIAGNOSIS — Z17.0 MALIGNANT NEOPLASM OF UPPER-INNER QUADRANT OF RIGHT BREAST IN FEMALE, ESTROGEN RECEPTOR POSITIVE: ICD-10-CM

## 2023-10-30 ENCOUNTER — INFUSION (OUTPATIENT)
Dept: ONCOLOGY | Facility: HOSPITAL | Age: 78
End: 2023-10-30
Payer: MEDICARE

## 2023-10-30 ENCOUNTER — LAB (OUTPATIENT)
Dept: LAB | Facility: HOSPITAL | Age: 78
End: 2023-10-30
Payer: MEDICARE

## 2023-10-30 VITALS
TEMPERATURE: 97.6 F | HEIGHT: 61 IN | DIASTOLIC BLOOD PRESSURE: 76 MMHG | WEIGHT: 183 LBS | RESPIRATION RATE: 18 BRPM | OXYGEN SATURATION: 100 % | HEART RATE: 80 BPM | SYSTOLIC BLOOD PRESSURE: 136 MMHG | BODY MASS INDEX: 34.55 KG/M2

## 2023-10-30 DIAGNOSIS — Z79.811 LONG TERM (CURRENT) USE OF AROMATASE INHIBITORS: ICD-10-CM

## 2023-10-30 DIAGNOSIS — Z17.0 MALIGNANT NEOPLASM OF UPPER-INNER QUADRANT OF RIGHT BREAST IN FEMALE, ESTROGEN RECEPTOR POSITIVE: ICD-10-CM

## 2023-10-30 DIAGNOSIS — E87.6 HYPOKALEMIA: Primary | ICD-10-CM

## 2023-10-30 DIAGNOSIS — C50.211 MALIGNANT NEOPLASM OF UPPER-INNER QUADRANT OF RIGHT BREAST IN FEMALE, ESTROGEN RECEPTOR POSITIVE: ICD-10-CM

## 2023-10-30 DIAGNOSIS — M85.851 OSTEOPENIA OF NECK OF RIGHT FEMUR: Primary | ICD-10-CM

## 2023-10-30 DIAGNOSIS — M85.851 OSTEOPENIA OF NECK OF RIGHT FEMUR: ICD-10-CM

## 2023-10-30 DIAGNOSIS — E87.6 HYPOKALEMIA: ICD-10-CM

## 2023-10-30 LAB
ALBUMIN SERPL-MCNC: 4 G/DL (ref 3.5–5.2)
ALBUMIN/GLOB SERPL: 1.4 G/DL
ALP SERPL-CCNC: 66 U/L (ref 39–117)
ALT SERPL W P-5'-P-CCNC: 7 U/L (ref 1–33)
ANION GAP SERPL CALCULATED.3IONS-SCNC: 9 MMOL/L (ref 5–15)
AST SERPL-CCNC: 12 U/L (ref 1–32)
BILIRUB SERPL-MCNC: 1.1 MG/DL (ref 0–1.2)
BUN SERPL-MCNC: 11 MG/DL (ref 8–23)
BUN/CREAT SERPL: 11.2 (ref 7–25)
CALCIUM SPEC-SCNC: 10.4 MG/DL (ref 8.6–10.5)
CHLORIDE SERPL-SCNC: 100 MMOL/L (ref 98–107)
CO2 SERPL-SCNC: 32 MMOL/L (ref 22–29)
CREAT SERPL-MCNC: 0.98 MG/DL (ref 0.57–1)
EGFRCR SERPLBLD CKD-EPI 2021: 59.9 ML/MIN/1.73
GLOBULIN UR ELPH-MCNC: 2.9 GM/DL
GLUCOSE SERPL-MCNC: 114 MG/DL (ref 65–99)
MAGNESIUM SERPL-MCNC: 1.6 MG/DL (ref 1.6–2.4)
PHOSPHATE SERPL-MCNC: 3.8 MG/DL (ref 2.5–4.5)
POTASSIUM SERPL-SCNC: 2.7 MMOL/L (ref 3.5–5.2)
PROT SERPL-MCNC: 6.9 G/DL (ref 6–8.5)
SODIUM SERPL-SCNC: 141 MMOL/L (ref 136–145)

## 2023-10-30 PROCEDURE — 25010000002 POTASSIUM CHLORIDE 10 MEQ/100ML SOLUTION: Performed by: NURSE PRACTITIONER

## 2023-10-30 PROCEDURE — 96372 THER/PROPH/DIAG INJ SC/IM: CPT

## 2023-10-30 PROCEDURE — 25010000002 DENOSUMAB 60 MG/ML SOLUTION PREFILLED SYRINGE: Performed by: NURSE PRACTITIONER

## 2023-10-30 PROCEDURE — 96365 THER/PROPH/DIAG IV INF INIT: CPT

## 2023-10-30 PROCEDURE — 80053 COMPREHEN METABOLIC PANEL: CPT

## 2023-10-30 PROCEDURE — 84100 ASSAY OF PHOSPHORUS: CPT

## 2023-10-30 PROCEDURE — 25810000003 SODIUM CHLORIDE 0.9 % SOLUTION: Performed by: NURSE PRACTITIONER

## 2023-10-30 PROCEDURE — 83735 ASSAY OF MAGNESIUM: CPT

## 2023-10-30 PROCEDURE — 36415 COLL VENOUS BLD VENIPUNCTURE: CPT

## 2023-10-30 PROCEDURE — 96366 THER/PROPH/DIAG IV INF ADDON: CPT

## 2023-10-30 RX ORDER — SODIUM CHLORIDE 9 MG/ML
250 INJECTION, SOLUTION INTRAVENOUS ONCE
Status: COMPLETED | OUTPATIENT
Start: 2023-10-30 | End: 2023-10-30

## 2023-10-30 RX ORDER — SODIUM CHLORIDE 9 MG/ML
250 INJECTION, SOLUTION INTRAVENOUS ONCE
Status: CANCELLED | OUTPATIENT
Start: 2023-10-30

## 2023-10-30 RX ORDER — POTASSIUM CHLORIDE 14.9 MG/ML
20 INJECTION INTRAVENOUS ONCE
Status: CANCELLED | OUTPATIENT
Start: 2023-10-30 | End: 2023-10-30

## 2023-10-30 RX ORDER — POTASSIUM CHLORIDE 14.9 MG/ML
20 INJECTION INTRAVENOUS ONCE
Status: DISCONTINUED | OUTPATIENT
Start: 2023-10-30 | End: 2023-10-30

## 2023-10-30 RX ORDER — POTASSIUM CHLORIDE 7.45 MG/ML
10 INJECTION INTRAVENOUS
Status: COMPLETED | OUTPATIENT
Start: 2023-10-30 | End: 2023-10-30

## 2023-10-30 RX ADMIN — POTASSIUM CHLORIDE 10 MEQ: 7.46 INJECTION, SOLUTION INTRAVENOUS at 11:50

## 2023-10-30 RX ADMIN — DENOSUMAB 60 MG: 60 INJECTION SUBCUTANEOUS at 14:35

## 2023-10-30 RX ADMIN — POTASSIUM CHLORIDE 10 MEQ: 7.46 INJECTION, SOLUTION INTRAVENOUS at 12:54

## 2023-10-30 RX ADMIN — SODIUM CHLORIDE 250 ML: 9 INJECTION, SOLUTION INTRAVENOUS at 11:51

## 2023-10-30 NOTE — PROGRESS NOTES
Per Shirley CORDON ok to proceed with prolia, give 20 meq of Kcl today and recheck labs on Friday.

## 2023-11-03 ENCOUNTER — LAB (OUTPATIENT)
Dept: LAB | Facility: HOSPITAL | Age: 78
End: 2023-11-03
Payer: MEDICARE

## 2023-11-03 DIAGNOSIS — E87.6 HYPOKALEMIA: Primary | ICD-10-CM

## 2023-11-03 DIAGNOSIS — E87.6 HYPOKALEMIA: ICD-10-CM

## 2023-11-03 LAB
ALBUMIN SERPL-MCNC: 4.1 G/DL (ref 3.5–5.2)
ALBUMIN/GLOB SERPL: 1.4 G/DL
ALP SERPL-CCNC: 71 U/L (ref 39–117)
ALT SERPL W P-5'-P-CCNC: 8 U/L (ref 1–33)
ANION GAP SERPL CALCULATED.3IONS-SCNC: 10 MMOL/L (ref 5–15)
AST SERPL-CCNC: 15 U/L (ref 1–32)
BILIRUB SERPL-MCNC: 1 MG/DL (ref 0–1.2)
BUN SERPL-MCNC: 10 MG/DL (ref 8–23)
BUN/CREAT SERPL: 11 (ref 7–25)
CALCIUM SPEC-SCNC: 8.5 MG/DL (ref 8.6–10.5)
CHLORIDE SERPL-SCNC: 100 MMOL/L (ref 98–107)
CO2 SERPL-SCNC: 30 MMOL/L (ref 22–29)
CREAT SERPL-MCNC: 0.91 MG/DL (ref 0.57–1)
EGFRCR SERPLBLD CKD-EPI 2021: 65.5 ML/MIN/1.73
GLOBULIN UR ELPH-MCNC: 3 GM/DL
GLUCOSE SERPL-MCNC: 108 MG/DL (ref 65–99)
POTASSIUM SERPL-SCNC: 3.2 MMOL/L (ref 3.5–5.2)
PROT SERPL-MCNC: 7.1 G/DL (ref 6–8.5)
SODIUM SERPL-SCNC: 140 MMOL/L (ref 136–145)

## 2023-11-03 PROCEDURE — 36415 COLL VENOUS BLD VENIPUNCTURE: CPT

## 2023-11-03 PROCEDURE — 80053 COMPREHEN METABOLIC PANEL: CPT

## 2023-11-06 ENCOUNTER — TELEPHONE (OUTPATIENT)
Dept: ONCOLOGY | Facility: CLINIC | Age: 78
End: 2023-11-06
Payer: MEDICARE

## 2023-11-06 NOTE — TELEPHONE ENCOUNTER
Pt is aware of decreased potassium level for the past month. I have contacted Dr. Awad office. Per the Hub Cindy in [place] office will be on the lookout for our fax with her lab results. Pt said she will also call [place] office tomorrow as well.

## 2023-11-06 NOTE — TELEPHONE ENCOUNTER
----- Message from BRAVO Gan sent at 11/6/2023 12:01 PM CST -----  Can you check with her and see if she has made contact with PCP for management of hypokalemia?

## 2023-11-08 ENCOUNTER — TELEPHONE (OUTPATIENT)
Dept: FAMILY MEDICINE CLINIC | Facility: CLINIC | Age: 78
End: 2023-11-08
Payer: MEDICARE

## 2023-11-08 NOTE — TELEPHONE ENCOUNTER
Pt called and stated she had labs and her potassium was low and they did a infusion on oct 30th and then labs again on 11/3 and it was still low so they told her to call her pcp to see what he suggest she do? [phone]

## 2023-11-13 ENCOUNTER — TELEPHONE (OUTPATIENT)
Dept: FAMILY MEDICINE CLINIC | Facility: CLINIC | Age: 78
End: 2023-11-13

## 2023-11-13 ENCOUNTER — OFFICE VISIT (OUTPATIENT)
Dept: FAMILY MEDICINE CLINIC | Facility: CLINIC | Age: 78
End: 2023-11-13
Payer: MEDICARE

## 2023-11-13 VITALS
WEIGHT: 184 LBS | OXYGEN SATURATION: 96 % | HEIGHT: 61 IN | SYSTOLIC BLOOD PRESSURE: 126 MMHG | HEART RATE: 72 BPM | DIASTOLIC BLOOD PRESSURE: 72 MMHG | BODY MASS INDEX: 34.74 KG/M2 | TEMPERATURE: 97.9 F

## 2023-11-13 DIAGNOSIS — E87.6 HYPOKALEMIA: Primary | ICD-10-CM

## 2023-11-13 DIAGNOSIS — R21 RASH: ICD-10-CM

## 2023-11-13 PROCEDURE — 99213 OFFICE O/P EST LOW 20 MIN: CPT | Performed by: FAMILY MEDICINE

## 2023-11-13 PROCEDURE — G0008 ADMIN INFLUENZA VIRUS VAC: HCPCS | Performed by: FAMILY MEDICINE

## 2023-11-13 PROCEDURE — 3078F DIAST BP <80 MM HG: CPT | Performed by: FAMILY MEDICINE

## 2023-11-13 PROCEDURE — 3074F SYST BP LT 130 MM HG: CPT | Performed by: FAMILY MEDICINE

## 2023-11-13 PROCEDURE — 90662 IIV NO PRSV INCREASED AG IM: CPT | Performed by: FAMILY MEDICINE

## 2023-11-13 RX ORDER — CLOTRIMAZOLE AND BETAMETHASONE DIPROPIONATE 10; .64 MG/G; MG/G
1 CREAM TOPICAL 2 TIMES DAILY
Qty: 15 G | Refills: 1 | Status: SHIPPED | OUTPATIENT
Start: 2023-11-13

## 2023-11-13 RX ORDER — POTASSIUM CHLORIDE 1500 MG/1
20 TABLET, EXTENDED RELEASE ORAL DAILY
Qty: 3 TABLET | Refills: 0 | Status: SHIPPED | OUTPATIENT
Start: 2023-11-13

## 2023-11-13 NOTE — TELEPHONE ENCOUNTER
Caller: [facility] [address] [place], [place] - [address] [facility] - [phone]  - [phone] FX    Relationship to patient: Pharmacy    Best call back number: [phone]     NEEDING CLARIFICATION ON THE potassium chloride (K-TAB) 20 MEQ tablet controlled-release ER tablet      SAYS 1 TAB BY MOUTH DAILY BUT THE QUANTITY IS ONLY 3?

## 2023-11-13 NOTE — PROGRESS NOTES
Subjective   Cee Quiles is a 78 y.o. female.     Chief Complaint   Patient presents with    Lab Follow up     High potassium, lab drawn by cancer doctor and they want PCP to manage       Rash     Left hand           Rash         She is having low potassium --taking 2 diuretics--she notes rash voer the left hand --told in the past she had fungus      Current Outpatient Medications:     amLODIPine (NORVASC) 5 MG tablet, , Disp: , Rfl:     atorvastatin (LIPITOR) 40 MG tablet, TAKE 1 TABLET BY MOUTH  DAILY, Disp: 90 tablet, Rfl: 3    calcium citrate-vitamin d (CALCITRATE) 315-250 MG-UNIT tablet tablet, Take  by mouth Daily., Disp: , Rfl:     Denosumab (PROLIA SC), Inject  under the skin into the appropriate area as directed., Disp: , Rfl:     glipizide (GLUCOTROL XL) 2.5 MG 24 hr tablet, TAKE 1 TABLET BY MOUTH  DAILY, Disp: 90 tablet, Rfl: 3    letrozole (FEMARA) 2.5 MG tablet, TAKE 1 TABLET BY MOUTH DAILY, Disp: 90 tablet, Rfl: 3    levothyroxine (SYNTHROID, LEVOTHROID) 100 MCG tablet, TAKE 1 TABLET BY MOUTH  DAILY, Disp: 90 tablet, Rfl: 3    Magnesium Oxide -Mg Supplement 400 MG capsule, Take 1 capsule by mouth Every 12 (Twelve) Hours., Disp: , Rfl:     meclizine (ANTIVERT) 25 MG tablet, Take 1 tablet by mouth 3 (Three) Times a Day As Needed for Dizziness., Disp: , Rfl:     nebivolol (BYSTOLIC) 20 MG tablet, TAKE 1 TABLET BY MOUTH  DAILY, Disp: 90 tablet, Rfl: 3    clotrimazole-betamethasone (Lotrisone) 1-0.05 % cream, Apply 1 application  topically to the appropriate area as directed 2 (Two) Times a Day., Disp: 15 g, Rfl: 1    potassium chloride (K-TAB) 20 MEQ tablet controlled-release ER tablet, Take 1 tablet by mouth Daily., Disp: 3 tablet, Rfl: 0  Allergies   Allergen Reactions    Keflex [Cephalexin] Anaphylaxis    Lortab [Hydrocodone-Acetaminophen] Nausea Only              Facility age limit for growth %emily is 20 years.    Past Medical History:   Diagnosis Date    Arthritis     Benign tumor of bones of  wrist and hand, left     WRIST    Cancer     LOBULAR RIGHT BREAST    Chronic knee pain     BILATERAL    Colon polyp     Cyst of ovary, left     Diabetes     Diabetes mellitus     Dizziness     Elevated cholesterol     Gallstones     Hx of colonic polyps     Hyperlipidemia     Hypertension     Hypertension     Hypothyroid     Kidney disease     Long term (current) use of aromatase inhibitors 4/23/2020    Malignant neoplasm of upper-inner quadrant of right breast in female, estrogen receptor positive 4/23/2020    Osteopenia of neck of femur 4/23/2020    PONV (postoperative nausea and vomiting)     Torn meniscus      Past Surgical History:   Procedure Laterality Date    CHOLECYSTECTOMY      COLONOSCOPY N/A 8/29/2022    Procedure: COLONOSCOPY WITH ANESTHESIA;  Surgeon: Rico Hilton MD;  Location: [facility] ENDOSCOPY;  Service: Gastroenterology;  Laterality: N/A;  pre hx colon polyp  post polyp  dr rusty lira    COLONOSCOPY W/ POLYPECTOMY  02/08/2012    Pedunculated polyp cecal pit, Hyperplastic polyp at 15 cm repeat exam in 5 years    COLONOSCOPY W/ POLYPECTOMY  07/31/2017    Tubular adenoma hepatic flexure repeat exam in 5 years    KNEE SURGERY Right     MASTECTOMY W/ SENTINEL NODE BIOPSY Bilateral 02/12/2019    Procedure: BILATERAL MASTECTOMY WITH RIGHT BREAST SENTINEL LYMPH NODE BIOPSY - RADIOLOGIST WILL INJECT;  Surgeon: Michael Hodges MD;  Location: [facility] OR;  Service: General    OVARIAN CYST DRAINAGE Left     TUMOR REMOVAL Left        Review of Systems   Constitutional: Negative.    HENT: Negative.     Eyes: Negative.    Respiratory: Negative.     Cardiovascular: Negative.    Gastrointestinal: Negative.    Endocrine: Negative.    Genitourinary: Negative.    Musculoskeletal: Negative.    Skin:  Positive for rash.   Allergic/Immunologic: Negative.    Neurological: Negative.    Hematological: Negative.    Psychiatric/Behavioral: Negative.         Objective /72   Pulse 72   Temp 97.9 °F (36.6 °C)   Ht  "154.9 cm (60.98\")   Wt 83.5 kg (184 lb)   SpO2 96%   BMI 34.79 kg/m²    Physical Exam  Vitals and nursing note reviewed.   Constitutional:       Appearance: Normal appearance. She is normal weight.   HENT:      Head: Normocephalic and atraumatic.      Nose: Nose normal.      Mouth/Throat:      Mouth: Mucous membranes are moist.   Eyes:      Pupils: Pupils are equal, round, and reactive to light.   Cardiovascular:      Rate and Rhythm: Normal rate and regular rhythm.      Pulses: Normal pulses.      Heart sounds: Normal heart sounds.   Pulmonary:      Effort: Pulmonary effort is normal.   Abdominal:      General: Abdomen is flat.   Musculoskeletal:         General: Normal range of motion.      Cervical back: Normal range of motion and neck supple.   Skin:     General: Skin is warm.      Capillary Refill: Capillary refill takes less than 2 seconds.      Findings: Rash present.   Neurological:      General: No focal deficit present.      Mental Status: She is alert. Mental status is at baseline.   Psychiatric:         Mood and Affect: Mood normal.         Assessment & Plan   Diagnoses and all orders for this visit:    1. Hypokalemia (Primary)    2. Rash    Other orders  -     potassium chloride (K-TAB) 20 MEQ tablet controlled-release ER tablet; Take 1 tablet by mouth Daily.  Dispense: 3 tablet; Refill: 0  -     clotrimazole-betamethasone (Lotrisone) 1-0.05 % cream; Apply 1 application  topically to the appropriate area as directed 2 (Two) Times a Day.  Dispense: 15 g; Refill: 1  -     Fluzone High-Dose 65+yrs ([id number])                 Orders Placed This Encounter   Procedures    Fluzone High-Dose 65+yrs ([id number])       Follow up: 4 week(s)  "

## 2023-12-13 ENCOUNTER — OFFICE VISIT (OUTPATIENT)
Dept: FAMILY MEDICINE CLINIC | Facility: CLINIC | Age: 78
End: 2023-12-13
Payer: MEDICARE

## 2023-12-13 VITALS
RESPIRATION RATE: 18 BRPM | HEART RATE: 63 BPM | DIASTOLIC BLOOD PRESSURE: 78 MMHG | OXYGEN SATURATION: 96 % | WEIGHT: 182 LBS | TEMPERATURE: 97.1 F | SYSTOLIC BLOOD PRESSURE: 130 MMHG | HEIGHT: 61 IN | BODY MASS INDEX: 34.36 KG/M2

## 2023-12-13 DIAGNOSIS — E87.6 HYPOKALEMIA: Primary | ICD-10-CM

## 2023-12-13 PROCEDURE — 3075F SYST BP GE 130 - 139MM HG: CPT | Performed by: FAMILY MEDICINE

## 2023-12-13 PROCEDURE — 99213 OFFICE O/P EST LOW 20 MIN: CPT | Performed by: FAMILY MEDICINE

## 2023-12-13 PROCEDURE — 3078F DIAST BP <80 MM HG: CPT | Performed by: FAMILY MEDICINE

## 2023-12-13 NOTE — PROGRESS NOTES
Subjective   Cee Quiles is a 78 y.o. female.     Chief Complaint   Patient presents with    Follow-up     1 mo fup Hypokalemia        History of Present Illness     She thinks she is more swollen since she is not taking the diuretic      Current Outpatient Medications:     amLODIPine (NORVASC) 5 MG tablet, , Disp: , Rfl:     atorvastatin (LIPITOR) 40 MG tablet, TAKE 1 TABLET BY MOUTH  DAILY, Disp: 90 tablet, Rfl: 3    calcium citrate-vitamin d (CALCITRATE) 315-250 MG-UNIT tablet tablet, Take  by mouth Daily., Disp: , Rfl:     clotrimazole-betamethasone (Lotrisone) 1-0.05 % cream, Apply 1 application  topically to the appropriate area as directed 2 (Two) Times a Day., Disp: 15 g, Rfl: 1    Denosumab (PROLIA SC), Inject  under the skin into the appropriate area as directed., Disp: , Rfl:     glipizide (GLUCOTROL XL) 2.5 MG 24 hr tablet, TAKE 1 TABLET BY MOUTH  DAILY, Disp: 90 tablet, Rfl: 3    letrozole (FEMARA) 2.5 MG tablet, TAKE 1 TABLET BY MOUTH DAILY, Disp: 90 tablet, Rfl: 3    levothyroxine (SYNTHROID, LEVOTHROID) 100 MCG tablet, TAKE 1 TABLET BY MOUTH  DAILY, Disp: 90 tablet, Rfl: 3    Magnesium Oxide -Mg Supplement 400 MG capsule, Take 1 capsule by mouth Every 12 (Twelve) Hours., Disp: , Rfl:     meclizine (ANTIVERT) 25 MG tablet, Take 1 tablet by mouth 3 (Three) Times a Day As Needed for Dizziness., Disp: , Rfl:     nebivolol (BYSTOLIC) 20 MG tablet, TAKE 1 TABLET BY MOUTH  DAILY, Disp: 90 tablet, Rfl: 3    potassium chloride (K-TAB) 20 MEQ tablet controlled-release ER tablet, Take 1 tablet by mouth Daily., Disp: 3 tablet, Rfl: 0  Allergies   Allergen Reactions    Keflex [Cephalexin] Anaphylaxis    Lortab [Hydrocodone-Acetaminophen] Nausea Only              Facility age limit for growth %emily is 20 years.    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, left     WRIST    Cancer     LOBULAR RIGHT BREAST    Chronic knee pain     BILATERAL    Colon polyp   "   Cyst of ovary, left     Diabetes     Diabetes mellitus     Dizziness     Elevated cholesterol     Gallstones     Hx of colonic polyps     Hyperlipidemia     Hypertension     Hypertension     Hypothyroid     Kidney disease     Long term (current) use of aromatase inhibitors 4/23/2020    Malignant neoplasm of upper-inner quadrant of right breast in female, estrogen receptor positive 4/23/2020    Osteopenia of neck of femur 4/23/2020    PONV (postoperative nausea and vomiting)     Torn meniscus      Past Surgical History:   Procedure Laterality Date    CHOLECYSTECTOMY      COLONOSCOPY N/A 8/29/2022    Procedure: COLONOSCOPY WITH ANESTHESIA;  Surgeon: Rico Hilton MD;  Location: [facility] ENDOSCOPY;  Service: Gastroenterology;  Laterality: N/A;  pre hx colon polyp  post polyp  dr rusty lira    COLONOSCOPY W/ POLYPECTOMY  02/08/2012    Pedunculated polyp cecal pit, Hyperplastic polyp at 15 cm repeat exam in 5 years    COLONOSCOPY W/ POLYPECTOMY  07/31/2017    Tubular adenoma hepatic flexure repeat exam in 5 years    KNEE SURGERY Right     MASTECTOMY W/ SENTINEL NODE BIOPSY Bilateral 02/12/2019    Procedure: BILATERAL MASTECTOMY WITH RIGHT BREAST SENTINEL LYMPH NODE BIOPSY - RADIOLOGIST WILL INJECT;  Surgeon: Michael Hodges MD;  Location: [facility] OR;  Service: General    OVARIAN CYST DRAINAGE Left     TUMOR REMOVAL Left        Review of Systems   Constitutional: Negative.    HENT: Negative.     Eyes: Negative.    Respiratory: Negative.     Cardiovascular: Negative.    Gastrointestinal: Negative.    Endocrine: Negative.    Genitourinary: Negative.    Musculoskeletal: Negative.    Skin: Negative.    Allergic/Immunologic: Negative.    Neurological: Negative.    Hematological: Negative.    Psychiatric/Behavioral: Negative.         Objective /78 (BP Location: Right arm, Patient Position: Sitting, Cuff Size: Large Adult)   Pulse 63   Temp 97.1 °F (36.2 °C) (Infrared)   Resp 18   Ht 154.9 cm (60.98\")   Wt " 82.6 kg (182 lb)   SpO2 96%   BMI 34.41 kg/m²    Physical Exam  Vitals and nursing note reviewed.   Constitutional:       Appearance: Normal appearance. She is normal weight.   HENT:      Head: Normocephalic and atraumatic.      Nose: Nose normal.      Mouth/Throat:      Mouth: Mucous membranes are moist.   Eyes:      Pupils: Pupils are equal, round, and reactive to light.   Cardiovascular:      Rate and Rhythm: Normal rate and regular rhythm.      Pulses: Normal pulses.      Heart sounds: Normal heart sounds.   Pulmonary:      Effort: Pulmonary effort is normal.   Abdominal:      General: Abdomen is flat.   Musculoskeletal:         General: Normal range of motion.      Cervical back: Normal range of motion.   Skin:     General: Skin is warm.      Capillary Refill: Capillary refill takes less than 2 seconds.   Neurological:      General: No focal deficit present.      Mental Status: She is alert.   Psychiatric:         Mood and Affect: Mood normal.         Assessment & Plan   Diagnoses and all orders for this visit:    1. Hypokalemia (Primary)  -     Basic metabolic panel                 Orders Placed This Encounter   Procedures    Basic metabolic panel     Order Specific Question:   Release to patient     Answer:   Routine Release [[id number]]       Follow up: 4 week(s)

## 2023-12-14 DIAGNOSIS — E87.6 HYPOKALEMIA: Primary | ICD-10-CM

## 2023-12-14 LAB
BUN SERPL-MCNC: 11 MG/DL (ref 8–23)
BUN/CREAT SERPL: 13.9 (ref 7–25)
CALCIUM SERPL-MCNC: 9.7 MG/DL (ref 8.6–10.5)
CHLORIDE SERPL-SCNC: 102 MMOL/L (ref 98–107)
CO2 SERPL-SCNC: 24.6 MMOL/L (ref 22–29)
CREAT SERPL-MCNC: 0.79 MG/DL (ref 0.57–1)
EGFRCR SERPLBLD CKD-EPI 2021: 77.6 ML/MIN/1.73
GLUCOSE SERPL-MCNC: 90 MG/DL (ref 65–99)
POTASSIUM SERPL-SCNC: 4 MMOL/L (ref 3.5–5.2)
SODIUM SERPL-SCNC: 137 MMOL/L (ref 136–145)

## 2023-12-14 RX ORDER — BUMETANIDE 0.5 MG/1
TABLET ORAL
Qty: 12 TABLET | Refills: 0 | Status: SHIPPED | OUTPATIENT
Start: 2023-12-14

## 2024-01-03 RX ORDER — ATORVASTATIN CALCIUM 40 MG/1
40 TABLET, FILM COATED ORAL DAILY
Qty: 90 TABLET | Refills: 3 | Status: SHIPPED | OUTPATIENT
Start: 2024-01-03

## 2024-01-16 ENCOUNTER — TELEPHONE (OUTPATIENT)
Dept: FAMILY MEDICINE CLINIC | Facility: CLINIC | Age: 79
End: 2024-01-16

## 2024-02-22 ENCOUNTER — LAB (OUTPATIENT)
Dept: LAB | Facility: HOSPITAL | Age: 79
End: 2024-02-22
Payer: MEDICARE

## 2024-02-22 DIAGNOSIS — Z85.3 HISTORY OF BREAST CANCER: ICD-10-CM

## 2024-02-22 DIAGNOSIS — Z79.811 LONG TERM (CURRENT) USE OF AROMATASE INHIBITORS: ICD-10-CM

## 2024-02-22 DIAGNOSIS — M85.851 OSTEOPENIA OF NECK OF RIGHT FEMUR: ICD-10-CM

## 2024-02-22 LAB
ALBUMIN SERPL-MCNC: 4.5 G/DL (ref 3.5–5.2)
ALBUMIN/GLOB SERPL: 1.3 G/DL
ALP SERPL-CCNC: 62 U/L (ref 39–117)
ALT SERPL W P-5'-P-CCNC: 12 U/L (ref 1–33)
ANION GAP SERPL CALCULATED.3IONS-SCNC: 14 MMOL/L (ref 5–15)
AST SERPL-CCNC: 17 U/L (ref 1–32)
BASOPHILS # BLD AUTO: 0.03 10*3/MM3 (ref 0–0.2)
BASOPHILS NFR BLD AUTO: 0.5 % (ref 0–1.5)
BILIRUB SERPL-MCNC: 1 MG/DL (ref 0–1.2)
BUN SERPL-MCNC: 19 MG/DL (ref 8–23)
BUN/CREAT SERPL: 17.4 (ref 7–25)
CALCIUM SPEC-SCNC: 10.2 MG/DL (ref 8.6–10.5)
CEA SERPL-MCNC: 2.01 NG/ML
CHLORIDE SERPL-SCNC: 98 MMOL/L (ref 98–107)
CO2 SERPL-SCNC: 27 MMOL/L (ref 22–29)
CREAT SERPL-MCNC: 1.09 MG/DL (ref 0.57–1)
DEPRECATED RDW RBC AUTO: 41 FL (ref 37–54)
EGFRCR SERPLBLD CKD-EPI 2021: 52.4 ML/MIN/1.73
EOSINOPHIL # BLD AUTO: 0.08 10*3/MM3 (ref 0–0.4)
EOSINOPHIL NFR BLD AUTO: 1.3 % (ref 0.3–6.2)
ERYTHROCYTE [DISTWIDTH] IN BLOOD BY AUTOMATED COUNT: 12.4 % (ref 12.3–15.4)
GLOBULIN UR ELPH-MCNC: 3.4 GM/DL
GLUCOSE SERPL-MCNC: 110 MG/DL (ref 65–99)
HCT VFR BLD AUTO: 41.1 % (ref 34–46.6)
HGB BLD-MCNC: 13.7 G/DL (ref 12–15.9)
IMM GRANULOCYTES # BLD AUTO: 0.01 10*3/MM3 (ref 0–0.05)
IMM GRANULOCYTES NFR BLD AUTO: 0.2 % (ref 0–0.5)
LYMPHOCYTES # BLD AUTO: 2.24 10*3/MM3 (ref 0.7–3.1)
LYMPHOCYTES NFR BLD AUTO: 35.8 % (ref 19.6–45.3)
MAGNESIUM SERPL-MCNC: 2 MG/DL (ref 1.6–2.4)
MCH RBC QN AUTO: 30.6 PG (ref 26.6–33)
MCHC RBC AUTO-ENTMCNC: 33.3 G/DL (ref 31.5–35.7)
MCV RBC AUTO: 91.7 FL (ref 79–97)
MONOCYTES # BLD AUTO: 0.4 10*3/MM3 (ref 0.1–0.9)
MONOCYTES NFR BLD AUTO: 6.4 % (ref 5–12)
NEUTROPHILS NFR BLD AUTO: 3.5 10*3/MM3 (ref 1.7–7)
NEUTROPHILS NFR BLD AUTO: 55.8 % (ref 42.7–76)
NRBC BLD AUTO-RTO: 0 /100 WBC (ref 0–0.2)
PHOSPHATE SERPL-MCNC: 4 MG/DL (ref 2.5–4.5)
PLATELET # BLD AUTO: 286 10*3/MM3 (ref 140–450)
PMV BLD AUTO: 9.2 FL (ref 6–12)
POTASSIUM SERPL-SCNC: 4.4 MMOL/L (ref 3.5–5.2)
PROT SERPL-MCNC: 7.9 G/DL (ref 6–8.5)
RBC # BLD AUTO: 4.48 10*6/MM3 (ref 3.77–5.28)
SODIUM SERPL-SCNC: 139 MMOL/L (ref 136–145)
WBC NRBC COR # BLD AUTO: 6.26 10*3/MM3 (ref 3.4–10.8)

## 2024-02-22 PROCEDURE — 83735 ASSAY OF MAGNESIUM: CPT

## 2024-02-22 PROCEDURE — 85025 COMPLETE CBC W/AUTO DIFF WBC: CPT

## 2024-02-22 PROCEDURE — 82378 CARCINOEMBRYONIC ANTIGEN: CPT

## 2024-02-22 PROCEDURE — 86300 IMMUNOASSAY TUMOR CA 15-3: CPT

## 2024-02-22 PROCEDURE — 80053 COMPREHEN METABOLIC PANEL: CPT

## 2024-02-22 PROCEDURE — 36415 COLL VENOUS BLD VENIPUNCTURE: CPT

## 2024-02-22 PROCEDURE — 84100 ASSAY OF PHOSPHORUS: CPT

## 2024-02-23 LAB — CANCER AG27-29 SERPL-ACNC: 24.2 U/ML (ref 0–38.6)

## 2024-02-29 ENCOUNTER — OFFICE VISIT (OUTPATIENT)
Dept: ONCOLOGY | Facility: CLINIC | Age: 79
End: 2024-02-29
Payer: MEDICARE

## 2024-02-29 VITALS
BODY MASS INDEX: 33.68 KG/M2 | TEMPERATURE: 97.2 F | RESPIRATION RATE: 16 BRPM | OXYGEN SATURATION: 98 % | DIASTOLIC BLOOD PRESSURE: 82 MMHG | HEART RATE: 68 BPM | WEIGHT: 178.4 LBS | HEIGHT: 61 IN | SYSTOLIC BLOOD PRESSURE: 132 MMHG

## 2024-02-29 DIAGNOSIS — M85.851 OSTEOPENIA OF NECK OF RIGHT FEMUR: ICD-10-CM

## 2024-02-29 DIAGNOSIS — Z85.3 HISTORY OF BREAST CANCER: Primary | ICD-10-CM

## 2024-02-29 DIAGNOSIS — Z79.811 LONG TERM (CURRENT) USE OF AROMATASE INHIBITORS: ICD-10-CM

## 2024-02-29 DIAGNOSIS — N18.31 STAGE 3A CHRONIC KIDNEY DISEASE: ICD-10-CM

## 2024-02-29 NOTE — PROGRESS NOTES
[facility] HEMATOLOGY & ONCOLOGY  [address]  [place] [address] [phone]    Patient Name: Cee Quiles  Encounter Date: 02/29/2024  YOB: 1945  Patient Number: [id number]      REASON FOR FOLLOW-UP: Cee Quiles is a pleasant 79 y.o.  female who is seen on followup for Stage IA right breast cancer, receptor positive, and HER-2/jeremy negative.  Low recurrence score of 6, absolute benefit of chemo is less than 1%.   She is on adjuvant letrozole.  She is also seen for anemia from iron deficiency and chronic kidney disease.      Pt had colonoscopy on 08/29/22.  Biopsy with benign colonic mucosa.  Histologic changes of an adenomatous polyp or hyperplastic polyp are not identified.  Adenomatous polyp, tubular type.  No high-grade dysplasia identified.     She has osteopenia and has been taking Femara which she is tolerating well.     INTERVAL HISTORY     Pt presents to clinic today for continued follow up.  She has no acute complaints today.    She had labs drawn and results were reviewed with her in office       Oncology/Hematology History Overview Note   DIAGNOSTIC ABNORMALITIES:  The patient was found to have a right breast mass by mammogram at [facility].   Right breast core biopsy 01/17/2019 showed infiltrating lobular carcinoma, grade 1. Greatest dimension of tumor measured 11.1 mm. ER 98%, ND 92%, HER-2/jeremy +1 and negative FISH, and Ki-67 at 7%,. Oncotype DX report. Low recurrence score of 6, absolute benefit of chemo is less than 1%  Breast MRI [facility] 01/30/2019, 1.8 x 0.7 x 0.6 cm, non mass-like linear enhancement at 1 o'clock. Second suspicious area of enhancement in the right breast just behind the nipple measuring 1 x 1 x 0.9 cm. Two areas of linear non mass-like enhancement has anterior depth at 1 o'clock in the left breast.   The patient was seen by Dr. Hodges 02/01/2019.  Planned for bilateral mastectomy and sentinel lymph node evaluation.   Ultrasound biopsy left breast showed atypical lobular hyperplasia. No histologic evidence of malignancy.   The patient had a followup with Dr. Hodges 02/13/2019.  CBC 02/13/2019 revealed a WBC of 10.6, hemoglobin 12.2, hematocrit 36.8, MCV 86.4, platelet count 211,000 with ANC of 9.4. CMP remarkable for a GFR of 46 mL/minute.  Pathology report 02/15/2019 left breast showed no evidence of in situ or invasive carcinoma. Right breast showed an infiltrating lobular carcinoma, grade 1 with residual 8 mm.       PREVIOUS INTERVENTIONS:   Bilateral mastectomy with right breast sentinel lymph node biopsy on 02/12/2019.  Adjuvant Femara 2.5 mg p.o. daily 03/22/2019 through present.      PREVIOUS INTERVENTIONS:  Ferrous sulfate 325 mg daily 04/22/2020 through 05/20/2020.  Resume 07/17/2020 through 08/20/2020.     Breast CA   2/12/2019 Initial Diagnosis    Breast CA (CMS/HCC)     Malignant neoplasm of upper-inner quadrant of right breast in female, estrogen receptor positive   4/23/2020 Initial Diagnosis    Malignant neoplasm of upper-inner quadrant of right breast in female, estrogen receptor positive (CMS/HCC)     4/24/2020 -  Chemotherapy    OP SUPPORTIVE Denosumab (Prolia) Q6M         PAST MEDICAL HISTORY:  ALLERGIES:  Allergies   Allergen Reactions    Keflex [Cephalexin] Anaphylaxis    Lortab [Hydrocodone-Acetaminophen] Nausea Only     CURRENT MEDICATIONS:  Outpatient Encounter Medications as of 2/29/2024   Medication Sig Dispense Refill    amLODIPine (NORVASC) 5 MG tablet       atorvastatin (LIPITOR) 40 MG tablet TAKE 1 TABLET BY MOUTH DAILY 90 tablet 3    bumetanide (BUMEX) 0.5 MG tablet Take one tablet by mouth every Monday, Wednesday and Friday. 12 tablet 0    calcium citrate-vitamin d (CALCITRATE) 315-250 MG-UNIT tablet tablet Take  by mouth Daily.      Denosumab (PROLIA SC) Inject  under the skin into the appropriate area as directed.       glipizide (GLUCOTROL XL) 2.5 MG 24 hr tablet TAKE 1 TABLET BY MOUTH  DAILY 90 tablet 3    letrozole (FEMARA) 2.5 MG tablet TAKE 1 TABLET BY MOUTH DAILY 90 tablet 3    levothyroxine (SYNTHROID, LEVOTHROID) 100 MCG tablet TAKE 1 TABLET BY MOUTH  DAILY 90 tablet 3    Magnesium Oxide -Mg Supplement 400 MG capsule Take 1 capsule by mouth Every 12 (Twelve) Hours.      meclizine (ANTIVERT) 25 MG tablet Take 1 tablet by mouth 3 (Three) Times a Day As Needed for Dizziness.      nebivolol (BYSTOLIC) 20 MG tablet TAKE 1 TABLET BY MOUTH  DAILY 90 tablet 3    [DISCONTINUED] clotrimazole-betamethasone (Lotrisone) 1-0.05 % cream Apply 1 application  topically to the appropriate area as directed 2 (Two) Times a Day. (Patient not taking: Reported on 2/29/2024) 15 g 1    [DISCONTINUED] potassium chloride (K-TAB) 20 MEQ tablet controlled-release ER tablet Take 1 tablet by mouth Daily. (Patient not taking: Reported on 2/29/2024) 3 tablet 0     No facility-administered encounter medications on file as of 2/29/2024.     ADULT ILLNESSES:  Patient Active Problem List   Diagnosis Code    Type 2 diabetes mellitus with diabetic chronic kidney disease E11.22    Essential hypertension I10    Acquired hypothyroidism E03.9    Chronic pain of right knee M25.561, G89.29    CKD (chronic kidney disease) stage 3, GFR 30-59 ml/min N18.30    Mixed hyperlipidemia E78.2    Morbidly obese E66.01    Breast CA C50.919    Postmenopausal Z78.0    Hypomagnesemia E83.42    Osteopenia of neck of femur M85.859    Malignant neoplasm of upper-inner quadrant of right breast in female, estrogen receptor positive C50.211, Z17.0    Long term (current) use of aromatase inhibitors Z79.811    Hx of adenomatous colonic polyps Z86.010    Hypokalemia E87.6    Rash R21     SURGERIES:  Past Surgical History:   Procedure Laterality Date    CHOLECYSTECTOMY      COLONOSCOPY N/A 8/29/2022    Procedure: COLONOSCOPY WITH ANESTHESIA;  Surgeon: Rico Hilton MD;  Location: [facility]  ENDOSCOPY;  Service: Gastroenterology;  Laterality: N/A;  pre hx colon polyp  post polyp  dr rusty lira    COLONOSCOPY W/ POLYPECTOMY  02/08/2012    Pedunculated polyp cecal pit, Hyperplastic polyp at 15 cm repeat exam in 5 years    COLONOSCOPY W/ POLYPECTOMY  07/31/2017    Tubular adenoma hepatic flexure repeat exam in 5 years    KNEE SURGERY Right     MASTECTOMY W/ SENTINEL NODE BIOPSY Bilateral 02/12/2019    Procedure: BILATERAL MASTECTOMY WITH RIGHT BREAST SENTINEL LYMPH NODE BIOPSY - RADIOLOGIST WILL INJECT;  Surgeon: Michael Hodges MD;  Location:  [facility] OR;  Service: General    OVARIAN CYST DRAINAGE Left     TUMOR REMOVAL Left      HEALTH MAINTENANCE ITEMS:  Health Maintenance Due   Topic Date Due    TDAP/TD VACCINES (1 - Tdap) Never done    ZOSTER VACCINE (1 of 2) Never done    RSV Vaccine - Adults (1 - 1-dose 60+ series) Never done    Pneumococcal Vaccine 65+ (2 of 2 - PPSV23 or PCV20) 01/13/2020    COVID-19 Vaccine (3 - 2023-24 season) 09/01/2023       <no information>  Last Completed Colonoscopy            COLORECTAL CANCER SCREENING (COLONOSCOPY - Every 5 Years) Next due on 8/29/2027 08/29/2022  COLONOSCOPY    08/29/2022  Surgical Procedure: COLONOSCOPY    04/24/2020  Occult Blood X 3, Stool - Stool, Per Rectum    08/14/2017  SCANNED - COLONOSCOPY    02/08/2012  SCANNED - COLONOSCOPY    Only the first 5 history entries have been loaded, but more history exists.                  Immunization History   Administered Date(s) Administered    COVID-19 (MODERNA) 1st,2nd,3rd Dose Monovalent 02/23/2021, 03/22/2021    FLUAD TRI 65YR+ 11/18/2019    Flu Vaccine Quad PF >36MO 11/13/2017    Fluad Quad 65+ 11/18/2019, 09/27/2022    Fluzone High Dose =>65 Years ([employer] ONLY) 10/23/2015, 10/31/2016, 10/31/2018    Fluzone High-Dose 65+yrs 11/13/2023    Fluzone Quad >6mos (Multi-dose) 11/13/2017    Influenza Quad Vaccine (Inpatient) 11/13/2017    Pneumococcal Conjugate 13-Valent (PCV13) 11/18/2019     Last  "Completed Mammogram       This patient has no relevant Health Maintenance data.              FAMILY HISTORY:  Family History   Problem Relation Age of Onset    Colon cancer Neg Hx     Colon polyps Neg Hx      SOCIAL HISTORY:  Social History     Socioeconomic History    Marital status:    Tobacco Use    Smoking status: Never    Smokeless tobacco: Never   Substance and Sexual Activity    Alcohol use: No    Drug use: No    Sexual activity: Defer       REVIEW OF SYSTEMS:    Review of Systems   Constitutional:  Negative for chills, fatigue and fever.   HENT:  Negative for congestion, mouth sores and trouble swallowing.    Eyes:  Negative for redness and visual disturbance.   Respiratory:  Negative for cough and shortness of breath.    Cardiovascular:  Negative for chest pain and palpitations.   Gastrointestinal:  Negative for abdominal pain, nausea and vomiting.   Endocrine: Negative for polydipsia and polyphagia.   Genitourinary:  Negative for difficulty urinating, dysuria and flank pain.   Musculoskeletal:  Negative for gait problem and joint swelling.   Skin:  Negative for pallor.   Allergic/Immunologic: Negative for food allergies.   Neurological:  Negative for dizziness, speech difficulty and weakness.   Hematological:  Negative for adenopathy. Does not bruise/bleed easily.   Psychiatric/Behavioral:  Negative for agitation, confusion and hallucinations.        VITAL SIGNS: /82   Pulse 68   Temp 97.2 °F (36.2 °C)   Resp 16   Ht 154.9 cm (61\")   Wt 80.9 kg (178 lb 6.4 oz)   SpO2 98%   BMI 33.71 kg/m²   Pain Score    02/29/24 1019   PainSc: 0-No pain         PHYSICAL EXAMINATION:     Physical Exam  Vitals reviewed.   Constitutional:       General: She is not in acute distress.  HENT:      Head: Normocephalic and atraumatic.   Eyes:      General: No scleral icterus.  Cardiovascular:      Rate and Rhythm: Normal rate.   Pulmonary:      Effort: No respiratory distress.      Breath sounds: No wheezing " or rales.   Chest:      Comments: Bilateral mastectomy scars.   No palpable evidence of reoccurrence    Abdominal:      General: Bowel sounds are normal.      Palpations: Abdomen is soft.   Musculoskeletal:      Cervical back: Neck supple.      Right lower leg: Edema present.      Left lower leg: Edema present.      Comments: Using cane for ambulation    Lymphadenopathy:      Upper Body:      Right upper body: No axillary adenopathy.      Left upper body: No axillary adenopathy.   Skin:     General: Skin is warm and dry.      Coloration: Skin is not pale.   Neurological:      Mental Status: She is alert and oriented to person, place, and time.   Psychiatric:         Mood and Affect: Mood normal.         Behavior: Behavior normal.         LABS    Lab Results - Last 18 Months   Lab Units 02/22/24  1031 09/14/23  0942 08/23/23  1028 03/13/23  0837 02/22/23  0854   HEMOGLOBIN g/dL 13.7 14.1 13.3 12.9 13.2   HEMATOCRIT % 41.1 42.6 40.9 37.8 41.2   MCV fL 91.7 92.2 90.3 86.9 90.5   WBC 10*3/mm3 6.26 8.47 6.91 7.73 8.48   RDW % 12.4 14.2 13.8 13.3 13.6   MPV fL 9.2  --  8.9  --  8.8   PLATELETS 10*3/mm3 286 287 237 257 282   IMM GRAN % % 0.2  --  0.3  --  0.5   NEUTROS ABS 10*3/mm3 3.50 5.27 4.41 5.25 5.08   LYMPHS ABS 10*3/mm3 2.24 2.42 1.84 1.69 2.32   MONOS ABS 10*3/mm3 0.40 0.56 0.49 0.55 0.68   EOS ABS 10*3/mm3 0.08 0.16 0.13 0.18 0.32   BASOS ABS 10*3/mm3 0.03 0.03 0.02 0.03 0.04   IMMATURE GRANS (ABS) 10*3/mm3 0.01  --  0.02  --  0.04   NRBC /100 WBC 0.0 0.0 0.0 0.0 0.0       Lab Results - Last 18 Months   Lab Units 02/22/24  1031 01/17/24  0833 12/13/23  1326 11/03/23  1018 10/30/23  1043 09/14/23  0942 08/23/23  1028 04/28/23  1242 03/13/23  0837 02/22/23  0854   GLUCOSE mg/dL 110* 100* 90 108* 114* 109* 114* 121*   < > 114*   SODIUM mmol/L 139 141 137 140 141 143 141 140   < > 137   POTASSIUM mmol/L 4.4 4.2 4.0 3.2* 2.7* 3.4* 3.6 3.5   < > 4.3   CO2 mmol/L 27.0 24.2 24.6 30.0* 32.0* 27.9 25.0 28.0   < > 26.0  "  CHLORIDE mmol/L 98 103 102 100 100 99 103 98   < > 100   ANION GAP mmol/L 14.0  --   --  10.0 9.0  --  13.0 14.0  --  11.0   CREATININE mg/dL 1.09* 1.02* 0.79 0.91 0.98 1.05* 0.96 1.20*   < > 1.04*   BUN mg/dL 19 15 11 10 11 13 12 21   < > 19   BUN / CREAT RATIO  17.4 14.7 13.9 11.0 11.2 12.4 12.5 17.5   < > 18.3   CALCIUM mg/dL 10.2 10.0 9.7 8.5* 10.4 10.5 9.6 10.9*   < > 10.3   ALK PHOS U/L 62  --   --  71 66 63 58 74   < > 78   TOTAL PROTEIN g/dL 7.9  --   --  7.1 6.9  --  7.2 7.3  --  7.5   ALT (SGPT) U/L 12  --   --  8 7 11 7 10   < > 12   AST (SGOT) U/L 17  --   --  15 12 14 13 15   < > 16   BILIRUBIN mg/dL 1.0  --   --  1.0 1.1 1.1 1.0 0.8   < > 0.5   ALBUMIN g/dL 4.5  --   --  4.1 4.0 4.7 4.3 4.3   < > 4.4   GLOBULIN gm/dL 3.4  --   --  3.0 2.9  --  2.9 3.0  --  3.1    < > = values in this interval not displayed.       Lab Results - Last 18 Months   Lab Units 02/22/24  1031 08/23/23  1028 03/13/23  0837 02/22/23  0854   CEA ng/mL 2.01 2.13 2.5 2.04       No results for input(s): \"IRON\", \"TIBC\", \"LABIRON\", \"FERRITIN\", \"G3QRBLV\", \"TSH\", \"FOLATE\" in the last 12288 hours.    Invalid input(s): \"VITB12\"      Cee Quiles reports a pain score of 0.  No intervention indicated.       ASSESSMENT:  1. History of breast cancer    2. Long term (current) use of aromatase inhibitors    3. Stage 3a chronic kidney disease    4. Osteopenia of neck of right femur          1.   History of Carcinoma of the right breast, infiltrating lobular.    - Low recurrence score of 6, absolute benefit of chemo is less than 1%:  Current Stage: AJCC Stage IA (T1c N0, M0, G1)   Tumor size 1.91 cm.  Hormone Status: ER 98%, IL 92%, HER-2/jeremy negative by FISH.  Baseline Node Status: 0/2 positive.  Treatment status: Post bilateral mastectomy and right sentinel node biopsy.  On adjuvant Femara 2.5 mg 03/22/2019 through present.  Plan for 10 years.   -Labs 2/22/24:  CA 27-29 stable at 24.2 and CEA 2.01  -Breast exam today unremarkable for " palpable masses or evidence of recurrence   -Continues to follow with Dr. Hodges    2.  Chronic kidney disease Stage IIIa  -BUN 19, Creatinine 1.09, GFR 52.4  -Hemoglobin 13.7, Hematocrit 41.1  -Continue follow up with [facility]    3.   Osteopenia   4. Long term use of AI  - Taking Femara 2.5 mg 03/22/2019 through present.  Plan for 10 years.   -No s/s of toxicity.  Monitor for hot flashes,osteonecrosis  -On denosumab (Prolia) q 6 months.   -Last prolia October 30, 2023, Due April 2024.    -Magnesium 2.0, Phosphorus 4.0, Calcium 10.2  -DEXA April 5, 2023: Femoral neck T Score -1.1, Lumbar Spine T Score 2.1        PLAN:  Stable for observation  Schedule denosumab 60 mg subcutaneously every 6 months  -Bone density improved and still on Femara.  Monitor for osteonecrosis of the jaw.  -Continue ongoing management per primary care physician and other specialists.  Return to office in 6 months with labs one week before appt with CBC with differential, CEA, CA27-29, and CMP, Mag, Phos  Plan of care discussed with patient.  Understanding expressed.  Patient agreeable to proceed.      Advance Care Planning   ACP discussion was declined by the patient. Patient does not have an advance directive, information provided.    [facility]  02/29/2024

## 2024-03-14 ENCOUNTER — OFFICE VISIT (OUTPATIENT)
Dept: FAMILY MEDICINE CLINIC | Facility: CLINIC | Age: 79
End: 2024-03-14
Payer: MEDICARE

## 2024-03-14 VITALS
DIASTOLIC BLOOD PRESSURE: 74 MMHG | HEART RATE: 80 BPM | OXYGEN SATURATION: 98 % | TEMPERATURE: 98.5 F | SYSTOLIC BLOOD PRESSURE: 136 MMHG | RESPIRATION RATE: 18 BRPM | WEIGHT: 180 LBS | HEIGHT: 61 IN | BODY MASS INDEX: 33.99 KG/M2

## 2024-03-14 DIAGNOSIS — I10 ESSENTIAL HYPERTENSION: Primary | ICD-10-CM

## 2024-03-14 DIAGNOSIS — E03.9 ACQUIRED HYPOTHYROIDISM: ICD-10-CM

## 2024-03-14 DIAGNOSIS — N18.30 TYPE 2 DIABETES MELLITUS WITH STAGE 3 CHRONIC KIDNEY DISEASE, WITHOUT LONG-TERM CURRENT USE OF INSULIN, UNSPECIFIED WHETHER STAGE 3A OR 3B CKD: ICD-10-CM

## 2024-03-14 DIAGNOSIS — E11.22 TYPE 2 DIABETES MELLITUS WITH STAGE 3 CHRONIC KIDNEY DISEASE, WITHOUT LONG-TERM CURRENT USE OF INSULIN, UNSPECIFIED WHETHER STAGE 3A OR 3B CKD: ICD-10-CM

## 2024-03-14 DIAGNOSIS — E78.2 MIXED HYPERLIPIDEMIA: ICD-10-CM

## 2024-03-14 DIAGNOSIS — N18.30 STAGE 3 CHRONIC KIDNEY DISEASE, UNSPECIFIED WHETHER STAGE 3A OR 3B CKD: ICD-10-CM

## 2024-03-14 DIAGNOSIS — M85.851 OSTEOPENIA OF NECK OF RIGHT FEMUR: ICD-10-CM

## 2024-03-14 NOTE — PROGRESS NOTES
Subjective   Cee Quiles is a 79 y.o. female.     Chief Complaint   Patient presents with    Hypertension        Hypertension         She thinks bs and bp are doing well..she is toleiang synthroid iwthout heat or cold intoancbnes..--she is tolerting statin tx without myalgias      Current Outpatient Medications:     amLODIPine (NORVASC) 5 MG tablet, , Disp: , Rfl:     atorvastatin (LIPITOR) 40 MG tablet, TAKE 1 TABLET BY MOUTH DAILY, Disp: 90 tablet, Rfl: 3    bumetanide (BUMEX) 0.5 MG tablet, Take one tablet by mouth every Monday, Wednesday and Friday., Disp: 12 tablet, Rfl: 0    calcium citrate-vitamin d (CALCITRATE) 315-250 MG-UNIT tablet tablet, Take  by mouth Daily., Disp: , Rfl:     Denosumab (PROLIA SC), Inject  under the skin into the appropriate area as directed., Disp: , Rfl:     glipizide (GLUCOTROL XL) 2.5 MG 24 hr tablet, TAKE 1 TABLET BY MOUTH  DAILY, Disp: 90 tablet, Rfl: 3    letrozole (FEMARA) 2.5 MG tablet, TAKE 1 TABLET BY MOUTH DAILY, Disp: 90 tablet, Rfl: 3    levothyroxine (SYNTHROID, LEVOTHROID) 100 MCG tablet, TAKE 1 TABLET BY MOUTH  DAILY, Disp: 90 tablet, Rfl: 3    Magnesium Oxide -Mg Supplement 400 MG capsule, Take 1 capsule by mouth Every 12 (Twelve) Hours., Disp: , Rfl:     meclizine (ANTIVERT) 25 MG tablet, Take 1 tablet by mouth 3 (Three) Times a Day As Needed for Dizziness., Disp: , Rfl:     nebivolol (BYSTOLIC) 20 MG tablet, TAKE 1 TABLET BY MOUTH  DAILY, Disp: 90 tablet, Rfl: 3  Allergies   Allergen Reactions    Keflex [Cephalexin] Anaphylaxis    Lortab [Hydrocodone-Acetaminophen] Nausea Only       BMI is >= 30 and <35. (Class 1 Obesity). The following options were offered after discussion;: nutrition counseling/recommendations      Facility age limit for growth %emily is 20 years.    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, left     WRIST    Cancer     LOBULAR RIGHT BREAST    Chronic knee pain     BILATERAL    Colon polyp      Cyst of ovary, left     Diabetes     Diabetes mellitus     Dizziness     Elevated cholesterol     Gallstones     Hx of colonic polyps     Hyperlipidemia     Hypertension     Hypertension     Hypothyroid     Kidney disease     Long term (current) use of aromatase inhibitors 4/23/2020    Malignant neoplasm of upper-inner quadrant of right breast in female, estrogen receptor positive 4/23/2020    Osteopenia of neck of femur 4/23/2020    PONV (postoperative nausea and vomiting)     Torn meniscus      Past Surgical History:   Procedure Laterality Date    CHOLECYSTECTOMY      COLONOSCOPY N/A 8/29/2022    Procedure: COLONOSCOPY WITH ANESTHESIA;  Surgeon: Rico Hilton MD;  Location: [facility] ENDOSCOPY;  Service: Gastroenterology;  Laterality: N/A;  pre hx colon polyp  post polyp  dr rusty lira    COLONOSCOPY W/ POLYPECTOMY  02/08/2012    Pedunculated polyp cecal pit, Hyperplastic polyp at 15 cm repeat exam in 5 years    COLONOSCOPY W/ POLYPECTOMY  07/31/2017    Tubular adenoma hepatic flexure repeat exam in 5 years    KNEE SURGERY Right     MASTECTOMY W/ SENTINEL NODE BIOPSY Bilateral 02/12/2019    Procedure: BILATERAL MASTECTOMY WITH RIGHT BREAST SENTINEL LYMPH NODE BIOPSY - RADIOLOGIST WILL INJECT;  Surgeon: Michael Hodges MD;  Location: [facility] OR;  Service: General    OVARIAN CYST DRAINAGE Left     TUMOR REMOVAL Left        Review of Systems   Constitutional: Negative.    HENT: Negative.     Eyes: Negative.    Respiratory: Negative.     Cardiovascular: Negative.    Gastrointestinal: Negative.    Endocrine: Negative.    Genitourinary: Negative.    Musculoskeletal: Negative.    Skin: Negative.    Allergic/Immunologic: Negative.    Neurological: Negative.    Hematological: Negative.    Psychiatric/Behavioral: Negative.         Objective   Physical Exam  Vitals and nursing note reviewed.   Constitutional:       Appearance: Normal appearance. She is normal weight.   HENT:      Head: Normocephalic and atraumatic.       Nose: Nose normal.      Mouth/Throat:      Mouth: Mucous membranes are moist.   Eyes:      Extraocular Movements: Extraocular movements intact.      Pupils: Pupils are equal, round, and reactive to light.   Cardiovascular:      Rate and Rhythm: Normal rate and regular rhythm.      Pulses: Normal pulses.      Heart sounds: Normal heart sounds.   Pulmonary:      Effort: Pulmonary effort is normal.      Breath sounds: Normal breath sounds.   Abdominal:      General: Abdomen is flat. Bowel sounds are normal.      Palpations: Abdomen is soft.   Musculoskeletal:         General: Normal range of motion.      Cervical back: Normal range of motion and neck supple.   Skin:     General: Skin is warm and dry.      Capillary Refill: Capillary refill takes less than 2 seconds.   Neurological:      General: No focal deficit present.      Mental Status: She is alert and oriented to person, place, and time. Mental status is at baseline.   Psychiatric:         Mood and Affect: Mood normal.         Assessment & Plan   Diagnoses and all orders for this visit:    1. Essential hypertension (Primary)  -     CBC & Differential  -     Comprehensive metabolic panel  -     Lipid Panel With / Chol / HDL Ratio  -     Hemoglobin A1c  -     TSH  -     Microalbumin / Creatinine Urine Ratio - Urine, Clean Catch    2. Mixed hyperlipidemia  -     CBC & Differential  -     Comprehensive metabolic panel  -     Lipid Panel With / Chol / HDL Ratio  -     Hemoglobin A1c  -     TSH  -     Microalbumin / Creatinine Urine Ratio - Urine, Clean Catch    3. Acquired hypothyroidism  -     CBC & Differential  -     Comprehensive metabolic panel  -     Lipid Panel With / Chol / HDL Ratio  -     Hemoglobin A1c  -     TSH  -     Microalbumin / Creatinine Urine Ratio - Urine, Clean Catch    4. Type 2 diabetes mellitus with stage 3 chronic kidney disease, without long-term current use of insulin, unspecified whether stage 3a or 3b CKD  -     CBC & Differential  -      Comprehensive metabolic panel  -     Lipid Panel With / Chol / HDL Ratio  -     Hemoglobin A1c  -     TSH  -     Microalbumin / Creatinine Urine Ratio - Urine, Clean Catch    5. Stage 3 chronic kidney disease, unspecified whether stage 3a or 3b CKD    6. Osteopenia of neck of right femur        She willfollo up with oncology andnephrology         Orders Placed This Encounter   Procedures    Comprehensive metabolic panel     Order Specific Question:   Release to patient     Answer:   Routine Release [[id number]]    Lipid Panel With / Chol / HDL Ratio     Order Specific Question:   Release to patient     Answer:   Routine Release [[id number]]    Hemoglobin A1c     Order Specific Question:   Release to patient     Answer:   Routine Release [[id number]]    TSH     Order Specific Question:   Release to patient     Answer:   Routine Release [[id number]]    Microalbumin / Creatinine Urine Ratio - Urine, Clean Catch     Order Specific Question:   Release to patient     Answer:   Routine Release [[id number]]    CBC & Differential     Order Specific Question:   Release to patient     Answer:   Routine Release [[id number]]       Follow up: 6 month(s)

## 2024-03-15 LAB
ALBUMIN SERPL-MCNC: 4.3 G/DL (ref 3.5–5.2)
ALBUMIN/CREAT UR: <8 MG/G CREAT (ref 0–29)
ALBUMIN/GLOB SERPL: 1.7 G/DL
ALP SERPL-CCNC: 58 U/L (ref 39–117)
ALT SERPL-CCNC: 13 U/L (ref 1–33)
AST SERPL-CCNC: 13 U/L (ref 1–32)
BASOPHILS # BLD AUTO: 0.03 10*3/MM3 (ref 0–0.2)
BASOPHILS NFR BLD AUTO: 0.4 % (ref 0–1.5)
BILIRUB SERPL-MCNC: 1 MG/DL (ref 0–1.2)
BUN SERPL-MCNC: 19 MG/DL (ref 8–23)
BUN/CREAT SERPL: 15.4 (ref 7–25)
CALCIUM SERPL-MCNC: 9.9 MG/DL (ref 8.6–10.5)
CHLORIDE SERPL-SCNC: 101 MMOL/L (ref 98–107)
CHOLEST SERPL-MCNC: 138 MG/DL (ref 0–200)
CHOLEST/HDLC SERPL: 3.14 {RATIO}
CO2 SERPL-SCNC: 21.7 MMOL/L (ref 22–29)
CREAT SERPL-MCNC: 1.23 MG/DL (ref 0.57–1)
CREAT UR-MCNC: 35.4 MG/DL
EGFRCR SERPLBLD CKD-EPI 2021: 45.4 ML/MIN/1.73
EOSINOPHIL # BLD AUTO: 0.11 10*3/MM3 (ref 0–0.4)
EOSINOPHIL NFR BLD AUTO: 1.5 % (ref 0.3–6.2)
ERYTHROCYTE [DISTWIDTH] IN BLOOD BY AUTOMATED COUNT: 12.7 % (ref 12.3–15.4)
GLOBULIN SER CALC-MCNC: 2.6 GM/DL
GLUCOSE SERPL-MCNC: 92 MG/DL (ref 65–99)
HBA1C MFR BLD: 5.3 % (ref 4.8–5.6)
HCT VFR BLD AUTO: 39.5 % (ref 34–46.6)
HDLC SERPL-MCNC: 44 MG/DL (ref 40–60)
HGB BLD-MCNC: 13.1 G/DL (ref 12–15.9)
IMM GRANULOCYTES # BLD AUTO: 0.03 10*3/MM3 (ref 0–0.05)
IMM GRANULOCYTES NFR BLD AUTO: 0.4 % (ref 0–0.5)
LDLC SERPL CALC-MCNC: 67 MG/DL (ref 0–100)
LYMPHOCYTES # BLD AUTO: 2.19 10*3/MM3 (ref 0.7–3.1)
LYMPHOCYTES NFR BLD AUTO: 30.8 % (ref 19.6–45.3)
MCH RBC QN AUTO: 30.7 PG (ref 26.6–33)
MCHC RBC AUTO-ENTMCNC: 33.2 G/DL (ref 31.5–35.7)
MCV RBC AUTO: 92.5 FL (ref 79–97)
MICROALBUMIN UR-MCNC: <3 UG/ML
MONOCYTES # BLD AUTO: 0.5 10*3/MM3 (ref 0.1–0.9)
MONOCYTES NFR BLD AUTO: 7 % (ref 5–12)
NEUTROPHILS # BLD AUTO: 4.24 10*3/MM3 (ref 1.7–7)
NEUTROPHILS NFR BLD AUTO: 59.9 % (ref 42.7–76)
NRBC BLD AUTO-RTO: 0 /100 WBC (ref 0–0.2)
PLATELET # BLD AUTO: 269 10*3/MM3 (ref 140–450)
POTASSIUM SERPL-SCNC: 4.4 MMOL/L (ref 3.5–5.2)
PROT SERPL-MCNC: 6.9 G/DL (ref 6–8.5)
RBC # BLD AUTO: 4.27 10*6/MM3 (ref 3.77–5.28)
SODIUM SERPL-SCNC: 139 MMOL/L (ref 136–145)
TRIGL SERPL-MCNC: 157 MG/DL (ref 0–150)
TSH SERPL DL<=0.005 MIU/L-ACNC: 1.18 UIU/ML (ref 0.27–4.2)
VLDLC SERPL CALC-MCNC: 27 MG/DL (ref 5–40)
WBC # BLD AUTO: 7.1 10*3/MM3 (ref 3.4–10.8)

## 2024-03-15 NOTE — PROGRESS NOTES
Patient notified and voiced understanding. Patient wants to wait on referral to nephrology she states her kidney dr and other dr has commented on how good it has been running.

## 2024-04-30 ENCOUNTER — LAB (OUTPATIENT)
Dept: LAB | Facility: HOSPITAL | Age: 79
End: 2024-04-30
Payer: MEDICARE

## 2024-04-30 ENCOUNTER — INFUSION (OUTPATIENT)
Dept: ONCOLOGY | Facility: HOSPITAL | Age: 79
End: 2024-04-30
Payer: MEDICARE

## 2024-04-30 VITALS
TEMPERATURE: 97.2 F | SYSTOLIC BLOOD PRESSURE: 138 MMHG | RESPIRATION RATE: 16 BRPM | BODY MASS INDEX: 33.04 KG/M2 | WEIGHT: 175 LBS | OXYGEN SATURATION: 97 % | HEIGHT: 61 IN | DIASTOLIC BLOOD PRESSURE: 59 MMHG | HEART RATE: 70 BPM

## 2024-04-30 DIAGNOSIS — Z17.0 MALIGNANT NEOPLASM OF UPPER-INNER QUADRANT OF RIGHT BREAST IN FEMALE, ESTROGEN RECEPTOR POSITIVE: ICD-10-CM

## 2024-04-30 DIAGNOSIS — C50.211 MALIGNANT NEOPLASM OF UPPER-INNER QUADRANT OF RIGHT BREAST IN FEMALE, ESTROGEN RECEPTOR POSITIVE: ICD-10-CM

## 2024-04-30 DIAGNOSIS — Z79.811 LONG TERM (CURRENT) USE OF AROMATASE INHIBITORS: Primary | ICD-10-CM

## 2024-04-30 DIAGNOSIS — Z79.811 LONG TERM (CURRENT) USE OF AROMATASE INHIBITORS: ICD-10-CM

## 2024-04-30 DIAGNOSIS — M85.851 OSTEOPENIA OF NECK OF RIGHT FEMUR: ICD-10-CM

## 2024-04-30 LAB
ALBUMIN SERPL-MCNC: 4.3 G/DL (ref 3.5–5.2)
ALBUMIN/GLOB SERPL: 1.4 G/DL
ALP SERPL-CCNC: 66 U/L (ref 39–117)
ALT SERPL W P-5'-P-CCNC: 11 U/L (ref 1–33)
ANION GAP SERPL CALCULATED.3IONS-SCNC: 14 MMOL/L (ref 5–15)
AST SERPL-CCNC: 15 U/L (ref 1–32)
BILIRUB SERPL-MCNC: 0.9 MG/DL (ref 0–1.2)
BUN SERPL-MCNC: 16 MG/DL (ref 8–23)
BUN/CREAT SERPL: 14.4 (ref 7–25)
CALCIUM SPEC-SCNC: 10.8 MG/DL (ref 8.6–10.5)
CHLORIDE SERPL-SCNC: 98 MMOL/L (ref 98–107)
CO2 SERPL-SCNC: 29 MMOL/L (ref 22–29)
CREAT SERPL-MCNC: 1.11 MG/DL (ref 0.57–1)
EGFRCR SERPLBLD CKD-EPI 2021: 51.3 ML/MIN/1.73
GLOBULIN UR ELPH-MCNC: 3 GM/DL
GLUCOSE SERPL-MCNC: 110 MG/DL (ref 65–99)
MAGNESIUM SERPL-MCNC: 1.6 MG/DL (ref 1.6–2.4)
PHOSPHATE SERPL-MCNC: 4.2 MG/DL (ref 2.5–4.5)
POTASSIUM SERPL-SCNC: 3 MMOL/L (ref 3.5–5.2)
PROT SERPL-MCNC: 7.3 G/DL (ref 6–8.5)
SODIUM SERPL-SCNC: 141 MMOL/L (ref 136–145)

## 2024-04-30 PROCEDURE — 83735 ASSAY OF MAGNESIUM: CPT

## 2024-04-30 PROCEDURE — 25010000002 DENOSUMAB 60 MG/ML SOLUTION PREFILLED SYRINGE: Performed by: NURSE PRACTITIONER

## 2024-04-30 PROCEDURE — 96372 THER/PROPH/DIAG INJ SC/IM: CPT

## 2024-04-30 PROCEDURE — 80053 COMPREHEN METABOLIC PANEL: CPT

## 2024-04-30 PROCEDURE — 84100 ASSAY OF PHOSPHORUS: CPT

## 2024-04-30 RX ADMIN — DENOSUMAB 60 MG: 60 INJECTION SUBCUTANEOUS at 13:52

## 2024-04-30 NOTE — PROGRESS NOTES
Labs reviewed w/patient.  Prolia administered per protocol.  Potassium reviewed w/Shirley NP in [facility]'s office.  Scripts sent to [facility] per office and follow up with PCP next week for repeat potassium levels.  Patient v/marie Khan RN

## 2024-05-14 ENCOUNTER — TELEPHONE (OUTPATIENT)
Dept: FAMILY MEDICINE CLINIC | Facility: CLINIC | Age: 79
End: 2024-05-14

## 2024-05-14 DIAGNOSIS — E87.6 LOW BLOOD POTASSIUM: Primary | ICD-10-CM

## 2024-05-14 NOTE — TELEPHONE ENCOUNTER
Caller: Cee Quiles    Relationship: Self    Best call back number: [phone]     What is the best time to reach you: ANYTIME    Who are you requesting to speak with (clinical staff, provider,  specific staff member): CLINICAL    What was the call regarding: BACK ON 04/30/24 [facility] SHE WAS ADVISED THAT HER POTASSIUM WAS LOW. CARLOS GUS HAD HER TO START TAKING POTASSIUM SUPPLEMENTS. IS THERE ANYTHING FURTHER DR. ANDREWS WOULD WANT CEE TO DO LIKE LABS?    Is it okay if the provider responds through MyChart: NO

## 2024-05-15 DIAGNOSIS — E87.6 LOW BLOOD POTASSIUM: ICD-10-CM

## 2024-05-16 LAB
BUN SERPL-MCNC: 12 MG/DL (ref 8–23)
BUN/CREAT SERPL: 13.3 (ref 7–25)
CALCIUM SERPL-MCNC: 9.3 MG/DL (ref 8.6–10.5)
CHLORIDE SERPL-SCNC: 104 MMOL/L (ref 98–107)
CO2 SERPL-SCNC: 24.7 MMOL/L (ref 22–29)
CREAT SERPL-MCNC: 0.9 MG/DL (ref 0.57–1)
EGFRCR SERPLBLD CKD-EPI 2021: 66 ML/MIN/1.73
GLUCOSE SERPL-MCNC: 103 MG/DL (ref 65–99)
POTASSIUM SERPL-SCNC: 3.8 MMOL/L (ref 3.5–5.2)
SODIUM SERPL-SCNC: 141 MMOL/L (ref 136–145)

## 2024-07-01 RX ORDER — NEBIVOLOL 20 MG/1
TABLET ORAL
Qty: 90 TABLET | Refills: 3 | Status: SHIPPED | OUTPATIENT
Start: 2024-07-01

## 2024-07-01 RX ORDER — LEVOTHYROXINE SODIUM 0.1 MG/1
100 TABLET ORAL DAILY
Qty: 90 TABLET | Refills: 3 | Status: SHIPPED | OUTPATIENT
Start: 2024-07-01

## 2024-07-01 RX ORDER — HYDROCHLOROTHIAZIDE 25 MG/1
TABLET ORAL
Qty: 90 TABLET | Refills: 3 | Status: SHIPPED | OUTPATIENT
Start: 2024-07-01

## 2024-07-01 RX ORDER — GLIPIZIDE 2.5 MG/1
2.5 TABLET, EXTENDED RELEASE ORAL DAILY
Qty: 90 TABLET | Refills: 3 | Status: SHIPPED | OUTPATIENT
Start: 2024-07-01

## 2024-07-01 NOTE — TELEPHONE ENCOUNTER
Rx Refill Note  Requested Prescriptions     Pending Prescriptions Disp Refills    nebivolol (BYSTOLIC) 20 MG tablet [Pharmacy Med Name: Nebivolol HCl 20 MG Oral Tablet] 90 tablet 3     Sig: TAKE 1 TABLET BY MOUTH DAILY    hydroCHLOROthiazide 25 MG tablet [Pharmacy Med Name: hydroCHLOROthiazide 25 MG Oral Tablet] 90 tablet 3     Sig: TAKE 1 TABLET BY MOUTH DAILY    levothyroxine (SYNTHROID, LEVOTHROID) 100 MCG tablet [Pharmacy Med Name: Levothyroxine Sodium 100 MCG Oral Tablet] 90 tablet 3     Sig: TAKE 1 TABLET BY MOUTH DAILY    glipizide (GLUCOTROL XL) 2.5 MG 24 hr tablet [Pharmacy Med Name: glipiZIDE ER 2.5 MG Oral Tablet Extended Release 24 Hour] 90 tablet 3     Sig: TAKE 1 TABLET BY MOUTH DAILY      Last office visit with prescribing clinician: 3/14/2024   Last telemedicine visit with prescribing clinician: Visit date not found   Next office visit with prescribing clinician: Visit date not found                         Would you like a call back once the refill request has been completed: [] Yes [x] No    If the office needs to give you a call back, can they leave a voicemail: [] Yes [x] No    Mary Duke MA  07/01/24, 07:34 CDT

## 2024-07-18 LAB
25(OH)D3 SERPL-MCNC: 57.9 NG/ML
ALBUMIN SERPL-MCNC: 4.3 G/DL (ref 3.5–5.2)
ALP SERPL-CCNC: 69 U/L (ref 35–104)
ALT SERPL-CCNC: 10 U/L (ref 5–33)
ANION GAP SERPL CALCULATED.3IONS-SCNC: 13 MMOL/L (ref 7–19)
AST SERPL-CCNC: 14 U/L (ref 5–32)
BACTERIA URNS QL MICRO: NEGATIVE /HPF
BASOPHILS # BLD: 0 K/UL (ref 0–0.2)
BASOPHILS NFR BLD: 0.4 % (ref 0–1)
BILIRUB SERPL-MCNC: 0.8 MG/DL (ref 0.2–1.2)
BILIRUB UR QL STRIP: NEGATIVE
BUN SERPL-MCNC: 18 MG/DL (ref 8–23)
CALCIUM SERPL-MCNC: 9.3 MG/DL (ref 8.8–10.2)
CHLORIDE SERPL-SCNC: 97 MMOL/L (ref 98–111)
CLARITY UR: CLEAR
CO2 SERPL-SCNC: 27 MMOL/L (ref 22–29)
COLOR UR: YELLOW
CREAT SERPL-MCNC: 0.9 MG/DL (ref 0.5–0.9)
CREAT UR-MCNC: 15.7 MG/DL (ref 28–217)
CRYSTALS URNS MICRO: NORMAL /HPF
EOSINOPHIL # BLD: 0.2 K/UL (ref 0–0.6)
EOSINOPHIL NFR BLD: 1.9 % (ref 0–5)
EPI CELLS #/AREA URNS AUTO: 1 /HPF (ref 0–5)
ERYTHROCYTE [DISTWIDTH] IN BLOOD BY AUTOMATED COUNT: 13.5 % (ref 11.5–14.5)
GLUCOSE SERPL-MCNC: 103 MG/DL (ref 74–109)
GLUCOSE UR STRIP.AUTO-MCNC: NEGATIVE MG/DL
HCT VFR BLD AUTO: 41.7 % (ref 37–47)
HGB BLD-MCNC: 13.5 G/DL (ref 12–16)
HGB UR STRIP.AUTO-MCNC: NEGATIVE MG/L
HYALINE CASTS #/AREA URNS AUTO: 0 /HPF (ref 0–8)
IMM GRANULOCYTES # BLD: 0 K/UL
KETONES UR STRIP.AUTO-MCNC: NEGATIVE MG/DL
LEUKOCYTE ESTERASE UR QL STRIP.AUTO: ABNORMAL
LYMPHOCYTES # BLD: 2.6 K/UL (ref 1.1–4.5)
LYMPHOCYTES NFR BLD: 31.2 % (ref 20–40)
MAGNESIUM SERPL-MCNC: 1.8 MG/DL (ref 1.6–2.4)
MCH RBC QN AUTO: 29.5 PG (ref 27–31)
MCHC RBC AUTO-ENTMCNC: 32.4 G/DL (ref 33–37)
MCV RBC AUTO: 91.2 FL (ref 81–99)
MONOCYTES # BLD: 0.6 K/UL (ref 0–0.9)
MONOCYTES NFR BLD: 7.7 % (ref 0–10)
NEUTROPHILS # BLD: 4.8 K/UL (ref 1.5–7.5)
NEUTS SEG NFR BLD: 58.4 % (ref 50–65)
NITRITE UR QL STRIP.AUTO: NEGATIVE
PH UR STRIP.AUTO: 6.5 [PH] (ref 5–8)
PHOSPHATE SERPL-MCNC: 3.7 MG/DL (ref 2.5–4.5)
PLATELET # BLD AUTO: 252 K/UL (ref 130–400)
PMV BLD AUTO: 8.9 FL (ref 9.4–12.3)
POTASSIUM SERPL-SCNC: 3.5 MMOL/L (ref 3.5–5)
PROT SERPL-MCNC: 7.4 G/DL (ref 6.6–8.7)
PROT UR STRIP.AUTO-MCNC: NEGATIVE MG/DL
PROT UR-MCNC: <4 MG/DL (ref 15–45)
PTH-INTACT SERPL-MCNC: 172.1 PG/ML (ref 15–65)
RBC # BLD AUTO: 4.57 M/UL (ref 4.2–5.4)
RBC #/AREA URNS AUTO: 2 /HPF (ref 0–4)
SODIUM SERPL-SCNC: 137 MMOL/L (ref 136–145)
SP GR UR STRIP.AUTO: 1 (ref 1–1.03)
URATE SERPL-MCNC: 6 MG/DL (ref 2.4–5.7)
UROBILINOGEN UR STRIP.AUTO-MCNC: 0.2 E.U./DL
WBC # BLD AUTO: 8.2 K/UL (ref 4.8–10.8)
WBC #/AREA URNS AUTO: 2 /HPF (ref 0–5)

## 2024-07-23 DIAGNOSIS — Z17.0 MALIGNANT NEOPLASM OF UPPER-INNER QUADRANT OF RIGHT BREAST IN FEMALE, ESTROGEN RECEPTOR POSITIVE: ICD-10-CM

## 2024-07-23 DIAGNOSIS — C50.211 MALIGNANT NEOPLASM OF UPPER-INNER QUADRANT OF RIGHT BREAST IN FEMALE, ESTROGEN RECEPTOR POSITIVE: ICD-10-CM

## 2024-07-23 RX ORDER — LETROZOLE 2.5 MG/1
2.5 TABLET, FILM COATED ORAL DAILY
Qty: 90 TABLET | Refills: 3 | Status: SHIPPED | OUTPATIENT
Start: 2024-07-23

## 2024-08-22 ENCOUNTER — LAB (OUTPATIENT)
Dept: LAB | Facility: HOSPITAL | Age: 79
End: 2024-08-22
Payer: MEDICARE

## 2024-08-22 DIAGNOSIS — Z85.3 HISTORY OF BREAST CANCER: ICD-10-CM

## 2024-08-22 DIAGNOSIS — M85.851 OSTEOPENIA OF NECK OF RIGHT FEMUR: ICD-10-CM

## 2024-08-22 DIAGNOSIS — N18.31 STAGE 3A CHRONIC KIDNEY DISEASE: ICD-10-CM

## 2024-08-22 LAB
ALBUMIN SERPL-MCNC: 4.3 G/DL (ref 3.5–5.2)
ALBUMIN/GLOB SERPL: 1.5 G/DL
ALP SERPL-CCNC: 56 U/L (ref 39–117)
ALT SERPL W P-5'-P-CCNC: 8 U/L (ref 1–33)
ANION GAP SERPL CALCULATED.3IONS-SCNC: 15 MMOL/L (ref 5–15)
AST SERPL-CCNC: 14 U/L (ref 1–32)
BASOPHILS # BLD AUTO: 0.03 10*3/MM3 (ref 0–0.2)
BASOPHILS NFR BLD AUTO: 0.5 % (ref 0–1.5)
BILIRUB SERPL-MCNC: 1.1 MG/DL (ref 0–1.2)
BUN SERPL-MCNC: 21 MG/DL (ref 8–23)
BUN/CREAT SERPL: 22.3 (ref 7–25)
CALCIUM SPEC-SCNC: 9.9 MG/DL (ref 8.6–10.5)
CHLORIDE SERPL-SCNC: 97 MMOL/L (ref 98–107)
CO2 SERPL-SCNC: 26 MMOL/L (ref 22–29)
CREAT SERPL-MCNC: 0.94 MG/DL (ref 0.57–1)
DEPRECATED RDW RBC AUTO: 44.2 FL (ref 37–54)
EGFRCR SERPLBLD CKD-EPI 2021: 62.6 ML/MIN/1.73
EOSINOPHIL # BLD AUTO: 0.08 10*3/MM3 (ref 0–0.4)
EOSINOPHIL NFR BLD AUTO: 1.4 % (ref 0.3–6.2)
ERYTHROCYTE [DISTWIDTH] IN BLOOD BY AUTOMATED COUNT: 13.4 % (ref 12.3–15.4)
GLOBULIN UR ELPH-MCNC: 2.9 GM/DL
GLUCOSE SERPL-MCNC: 97 MG/DL (ref 65–99)
HCT VFR BLD AUTO: 38.9 % (ref 34–46.6)
HGB BLD-MCNC: 12.9 G/DL (ref 12–15.9)
IMM GRANULOCYTES # BLD AUTO: 0.02 10*3/MM3 (ref 0–0.05)
IMM GRANULOCYTES NFR BLD AUTO: 0.4 % (ref 0–0.5)
LYMPHOCYTES # BLD AUTO: 1.39 10*3/MM3 (ref 0.7–3.1)
LYMPHOCYTES NFR BLD AUTO: 25.1 % (ref 19.6–45.3)
MAGNESIUM SERPL-MCNC: 1.5 MG/DL (ref 1.6–2.4)
MCH RBC QN AUTO: 29.9 PG (ref 26.6–33)
MCHC RBC AUTO-ENTMCNC: 33.2 G/DL (ref 31.5–35.7)
MCV RBC AUTO: 90 FL (ref 79–97)
MONOCYTES # BLD AUTO: 0.4 10*3/MM3 (ref 0.1–0.9)
MONOCYTES NFR BLD AUTO: 7.2 % (ref 5–12)
NEUTROPHILS NFR BLD AUTO: 3.61 10*3/MM3 (ref 1.7–7)
NEUTROPHILS NFR BLD AUTO: 65.4 % (ref 42.7–76)
NRBC BLD AUTO-RTO: 0 /100 WBC (ref 0–0.2)
PHOSPHATE SERPL-MCNC: 4.4 MG/DL (ref 2.5–4.5)
PLATELET # BLD AUTO: 242 10*3/MM3 (ref 140–450)
PMV BLD AUTO: 8.8 FL (ref 6–12)
POTASSIUM SERPL-SCNC: 3.4 MMOL/L (ref 3.5–5.2)
PROT SERPL-MCNC: 7.2 G/DL (ref 6–8.5)
RBC # BLD AUTO: 4.32 10*6/MM3 (ref 3.77–5.28)
SODIUM SERPL-SCNC: 138 MMOL/L (ref 136–145)
WBC NRBC COR # BLD AUTO: 5.53 10*3/MM3 (ref 3.4–10.8)

## 2024-08-22 PROCEDURE — 83735 ASSAY OF MAGNESIUM: CPT

## 2024-08-22 PROCEDURE — 82378 CARCINOEMBRYONIC ANTIGEN: CPT

## 2024-08-22 PROCEDURE — 80053 COMPREHEN METABOLIC PANEL: CPT

## 2024-08-22 PROCEDURE — 36415 COLL VENOUS BLD VENIPUNCTURE: CPT

## 2024-08-22 PROCEDURE — 86300 IMMUNOASSAY TUMOR CA 15-3: CPT

## 2024-08-22 PROCEDURE — 85025 COMPLETE CBC W/AUTO DIFF WBC: CPT

## 2024-08-22 PROCEDURE — 84100 ASSAY OF PHOSPHORUS: CPT

## 2024-08-23 LAB
CANCER AG27-29 SERPL-ACNC: 23.3 U/ML (ref 0–38.6)
CEA SERPL-MCNC: 1.81 NG/ML

## 2024-08-29 ENCOUNTER — OFFICE VISIT (OUTPATIENT)
Dept: ONCOLOGY | Facility: CLINIC | Age: 79
End: 2024-08-29
Payer: MEDICARE

## 2024-08-29 VITALS
SYSTOLIC BLOOD PRESSURE: 126 MMHG | HEIGHT: 61 IN | OXYGEN SATURATION: 97 % | HEART RATE: 67 BPM | DIASTOLIC BLOOD PRESSURE: 84 MMHG | TEMPERATURE: 97.2 F | WEIGHT: 183.8 LBS | RESPIRATION RATE: 16 BRPM | BODY MASS INDEX: 34.7 KG/M2

## 2024-08-29 DIAGNOSIS — N18.2 STAGE 2 CHRONIC KIDNEY DISEASE: ICD-10-CM

## 2024-08-29 DIAGNOSIS — M85.851 OSTEOPENIA OF NECK OF RIGHT FEMUR: ICD-10-CM

## 2024-08-29 DIAGNOSIS — Z85.3 HISTORY OF BREAST CANCER: Primary | ICD-10-CM

## 2024-08-29 DIAGNOSIS — M85.851 OSTEOPENIA OF NECK OF RIGHT FEMUR: Primary | ICD-10-CM

## 2024-08-29 DIAGNOSIS — Z79.811 LONG TERM (CURRENT) USE OF AROMATASE INHIBITORS: ICD-10-CM

## 2024-08-29 NOTE — PROGRESS NOTES
[facility]  [address]  [place] [address] [phone]    Patient Name: Cee Quiles  Encounter Date: 08/29/2024  YOB: 1945  Patient Number: [id number]      REASON FOR FOLLOW-UP: Cee Quiles is a pleasant 79 y.o.  female who is seen on followup for Stage IA right breast cancer, receptor positive, and HER-2/jeremy negative.  Low recurrence score of 6, absolute benefit of chemo is less than 1%.   She is on adjuvant letrozole.  She is also seen for anemia from iron deficiency and chronic kidney disease.      Pt had colonoscopy on 08/29/22.  Biopsy with benign colonic mucosa.  Histologic changes of an adenomatous polyp or hyperplastic polyp are not identified.  Adenomatous polyp, tubular type.  No high-grade dysplasia identified.     She has osteopenia and has been taking Femara which she is tolerating well.     INTERVAL HISTORY     Pt presents to clinic today for continued follow up.  She has no acute complaints today.    She had labs drawn and results were reviewed with her in office       Oncology/Hematology History Overview Note   DIAGNOSTIC ABNORMALITIES:  The patient was found to have a right breast mass by mammogram at [facility].   Right breast core biopsy 01/17/2019 showed infiltrating lobular carcinoma, grade 1. Greatest dimension of tumor measured 11.1 mm. ER 98%, ID 92%, HER-2/jeremy +1 and negative FISH, and Ki-67 at 7%,. Oncotype DX report. Low recurrence score of 6, absolute benefit of chemo is less than 1%  Breast MRI [facility] 01/30/2019, 1.8 x 0.7 x 0.6 cm, non mass-like linear enhancement at 1 o'clock. Second suspicious area of enhancement in the right breast just behind the nipple measuring 1 x 1 x 0.9 cm. Two areas of linear non mass-like enhancement has anterior depth at 1 o'clock in the left breast.   The patient was seen by Dr. Hodges 02/01/2019.  Planned for bilateral mastectomy and sentinel lymph node evaluation.   Ultrasound biopsy left breast showed atypical lobular hyperplasia. No histologic evidence of malignancy.   The patient had a followup with Dr. Hodges 02/13/2019.  CBC 02/13/2019 revealed a WBC of 10.6, hemoglobin 12.2, hematocrit 36.8, MCV 86.4, platelet count 211,000 with ANC of 9.4. CMP remarkable for a GFR of 46 mL/minute.  Pathology report 02/15/2019 left breast showed no evidence of in situ or invasive carcinoma. Right breast showed an infiltrating lobular carcinoma, grade 1 with residual 8 mm.       PREVIOUS INTERVENTIONS:   Bilateral mastectomy with right breast sentinel lymph node biopsy on 02/12/2019.  Adjuvant Femara 2.5 mg p.o. daily 03/22/2019 through present.      PREVIOUS INTERVENTIONS:  Ferrous sulfate 325 mg daily 04/22/2020 through 05/20/2020.  Resume 07/17/2020 through 08/20/2020.     Breast CA   2/12/2019 Initial Diagnosis    Breast CA (CMS/HCC)     Malignant neoplasm of upper-inner quadrant of right breast in female, estrogen receptor positive   4/23/2020 Initial Diagnosis    Malignant neoplasm of upper-inner quadrant of right breast in female, estrogen receptor positive (CMS/HCC)     4/24/2020 -  Chemotherapy    OP SUPPORTIVE Denosumab (Prolia) Q6M         PAST MEDICAL HISTORY:  ALLERGIES:  Allergies   Allergen Reactions    Keflex [Cephalexin] Anaphylaxis    Lortab [Hydrocodone-Acetaminophen] Nausea Only     CURRENT MEDICATIONS:  Outpatient Encounter Medications as of 8/29/2024   Medication Sig Dispense Refill    amLODIPine (NORVASC) 5 MG tablet       atorvastatin (LIPITOR) 40 MG tablet TAKE 1 TABLET BY MOUTH DAILY 90 tablet 3    bumetanide (BUMEX) 0.5 MG tablet Take one tablet by mouth every Monday, Wednesday and Friday. 12 tablet 0    calcium citrate-vitamin d (CALCITRATE) 315-250 MG-UNIT tablet tablet Take  by mouth Daily.      Denosumab (PROLIA SC) Inject  under the skin into the appropriate area as directed.       glipizide (GLUCOTROL XL) 2.5 MG 24 hr tablet TAKE 1 TABLET BY MOUTH DAILY 90 tablet 3    letrozole (FEMARA) 2.5 MG tablet TAKE 1 TABLET BY MOUTH DAILY 90 tablet 3    levothyroxine (SYNTHROID, LEVOTHROID) 100 MCG tablet TAKE 1 TABLET BY MOUTH DAILY 90 tablet 3    Magnesium Oxide -Mg Supplement 400 MG capsule Take 1 capsule by mouth Every 12 (Twelve) Hours.      meclizine (ANTIVERT) 25 MG tablet Take 1 tablet by mouth 3 (Three) Times a Day As Needed for Dizziness.      nebivolol (BYSTOLIC) 20 MG tablet TAKE 1 TABLET BY MOUTH DAILY 90 tablet 3    [DISCONTINUED] hydroCHLOROthiazide 25 MG tablet TAKE 1 TABLET BY MOUTH DAILY (Patient not taking: Reported on 8/29/2024) 90 tablet 3     No facility-administered encounter medications on file as of 8/29/2024.     ADULT ILLNESSES:  Patient Active Problem List   Diagnosis Code    Type 2 diabetes mellitus with diabetic chronic kidney disease E11.22    Essential hypertension I10    Acquired hypothyroidism E03.9    Chronic pain of right knee M25.561, G89.29    CKD (chronic kidney disease) stage 3, GFR 30-59 ml/min N18.30    Mixed hyperlipidemia E78.2    Morbidly obese E66.01    Breast CA C50.919    Postmenopausal Z78.0    Hypomagnesemia E83.42    Osteopenia of neck of femur M85.859    Malignant neoplasm of upper-inner quadrant of right breast in female, estrogen receptor positive C50.211, Z17.0    Long term (current) use of aromatase inhibitors Z79.811    Hx of adenomatous colonic polyps Z86.010    Hypokalemia E87.6    Rash R21     SURGERIES:  Past Surgical History:   Procedure Laterality Date    CHOLECYSTECTOMY      COLONOSCOPY N/A 8/29/2022    Procedure: COLONOSCOPY WITH ANESTHESIA;  Surgeon: Rico Hilton MD;  Location: [facility] ENDOSCOPY;  Service: Gastroenterology;  Laterality: N/A;  pre hx colon polyp  post polyp  dr rusty lira    COLONOSCOPY W/ POLYPECTOMY  02/08/2012    Pedunculated polyp cecal pit, Hyperplastic polyp at 15 cm repeat exam in 5 years    COLONOSCOPY W/  POLYPECTOMY  07/31/2017    Tubular adenoma hepatic flexure repeat exam in 5 years    KNEE SURGERY Right     MASTECTOMY W/ SENTINEL NODE BIOPSY Bilateral 02/12/2019    Procedure: BILATERAL MASTECTOMY WITH RIGHT BREAST SENTINEL LYMPH NODE BIOPSY - RADIOLOGIST WILL INJECT;  Surgeon: Michael Hodges MD;  Location: [facility] OR;  Service: General    OVARIAN CYST DRAINAGE Left     TUMOR REMOVAL Left      HEALTH MAINTENANCE ITEMS:  Health Maintenance Due   Topic Date Due    TDAP/TD VACCINES (1 - Tdap) Never done    ZOSTER VACCINE (1 of 2) Never done    RSV Vaccine - Adults (1 - 1-dose 60+ series) Never done    Pneumococcal Vaccine 65+ (2 of 2 - PPSV23 or PCV20) 01/13/2020    COVID-19 Vaccine (3 - 2023-24 season) 09/01/2023    DIABETIC EYE EXAM  07/13/2024    INFLUENZA VACCINE  08/01/2024    ANNUAL WELLNESS VISIT  09/14/2024    HEMOGLOBIN A1C  09/14/2024       <no information>  Last Completed Colonoscopy            COLORECTAL CANCER SCREENING (COLONOSCOPY - Every 5 Years) Next due on 8/29/2027 08/29/2022  COLONOSCOPY    08/29/2022  Surgical Procedure: COLONOSCOPY    04/24/2020  Occult Blood X 3, Stool - Stool, Per Rectum    08/14/2017  SCANNED - COLONOSCOPY    07/31/2017  Outside Claim: SC COLSC FLX W/RMVL OF TUMOR POLYP LESION SNARE TQ    Only the first 5 history entries have been loaded, but more history exists.                  Immunization History   Administered Date(s) Administered    COVID-19 (MODERNA) 1st,2nd,3rd Dose Monovalent 02/23/2021, 03/22/2021    FLUAD TRI 65YR+ 11/18/2019    Flu Vaccine Quad PF >36MO 11/13/2017    Fluad Quad 65+ 11/18/2019, 09/27/2022    Fluzone  >6mos 11/13/2017    Fluzone High-Dose 65+YRS 10/23/2015, 10/31/2016, 10/31/2018    Fluzone High-Dose 65+yrs 11/13/2023    Fluzone Quad >6mos (Multi-dose) 11/13/2017    Pneumococcal Conjugate 13-Valent (PCV13) 11/18/2019     Last Completed Mammogram       This patient has no relevant Health Maintenance data.              FAMILY HISTORY:  Family  "History   Problem Relation Age of Onset    Colon cancer Neg Hx     Colon polyps Neg Hx      SOCIAL HISTORY:  Social History     Socioeconomic History    Marital status:    Tobacco Use    Smoking status: Never    Smokeless tobacco: Never   Substance and Sexual Activity    Alcohol use: No    Drug use: No    Sexual activity: Defer       REVIEW OF SYSTEMS:    Review of Systems   Constitutional:  Negative for chills, fatigue and fever.   HENT:  Negative for congestion, mouth sores and trouble swallowing.    Eyes:  Negative for redness and visual disturbance.   Respiratory:  Negative for cough and shortness of breath.    Cardiovascular:  Negative for chest pain and palpitations.   Gastrointestinal:  Negative for abdominal pain, nausea and vomiting.   Endocrine: Negative for polydipsia and polyphagia.   Genitourinary:  Negative for difficulty urinating, dysuria and flank pain.   Musculoskeletal:  Negative for gait problem and joint swelling.   Skin:  Negative for pallor.   Allergic/Immunologic: Negative for food allergies.   Neurological:  Negative for dizziness, speech difficulty and weakness.   Hematological:  Negative for adenopathy. Does not bruise/bleed easily.   Psychiatric/Behavioral:  Negative for agitation, confusion and hallucinations.        VITAL SIGNS: /84   Pulse 67   Temp 97.2 °F (36.2 °C)   Resp 16   Ht 154.9 cm (61\")   Wt 83.4 kg (183 lb 12.8 oz)   SpO2 97%   BMI 34.73 kg/m²   Pain Score    08/29/24 1043   PainSc: 0-No pain         PHYSICAL EXAMINATION:     Physical Exam  Vitals reviewed.   Constitutional:       General: She is not in acute distress.  HENT:      Head: Normocephalic and atraumatic.   Eyes:      General: No scleral icterus.  Cardiovascular:      Rate and Rhythm: Normal rate.   Pulmonary:      Effort: No respiratory distress.      Breath sounds: No wheezing or rales.   Chest:      Comments: Bilateral mastectomy scars.   No palpable evidence of reoccurrence    Abdominal:    "   General: Bowel sounds are normal.      Palpations: Abdomen is soft.   Musculoskeletal:      Cervical back: Neck supple.      Right lower leg: Edema present.      Left lower leg: Edema present.      Comments: Using cane for ambulation    Lymphadenopathy:      Upper Body:      Right upper body: No axillary adenopathy.      Left upper body: No axillary adenopathy.   Skin:     General: Skin is warm and dry.      Coloration: Skin is not pale.   Neurological:      Mental Status: She is alert and oriented to person, place, and time.   Psychiatric:         Mood and Affect: Mood normal.         Behavior: Behavior normal.         LABS    Lab Results - Last 18 Months   Lab Units 08/22/24  1109 07/18/24  0931 03/14/24  0937 02/22/24  1031 09/14/23  0942 08/23/23  1028 03/13/23  0837   HEMOGLOBIN g/dL 12.9 13.5 13.1 13.7 14.1 13.3 12.9   HEMATOCRIT % 38.9 41.7 39.5 41.1 42.6 40.9 37.8   MCV fL 90.0 91.2 92.5 91.7 92.2 90.3 86.9   WBC 10*3/mm3 5.53 8.2 7.10 6.26 8.47 6.91 7.73   RDW % 13.4 13.5 12.7 12.4 14.2 13.8 13.3   MPV fL 8.8 8.9*  --  9.2  --  8.9  --    PLATELETS 10*3/mm3 242 252 269 286 287 237 257   IMM GRAN % % 0.4  --   --  0.2  --  0.3  --    NEUTROS ABS 10*3/mm3 3.61 4.8 4.24 3.50 5.27 4.41 5.25   LYMPHS ABS 10*3/mm3 1.39 2.6 2.19 2.24 2.42 1.84 1.69   MONOS ABS 10*3/mm3 0.40 0.60 0.50 0.40 0.56 0.49 0.55   EOS ABS 10*3/mm3 0.08 0.20 0.11 0.08 0.16 0.13 0.18   BASOS ABS 10*3/mm3 0.03 0.00 0.03 0.03 0.03 0.02 0.03   IMMATURE GRANS (ABS) 10*3/mm3 0.02 0.0  --  0.01  --  0.02  --    NRBC /100 WBC 0.0  --  0.0 0.0 0.0 0.0 0.0       Lab Results - Last 18 Months   Lab Units 08/22/24  1109 07/18/24  0931 05/15/24  0822 04/30/24  1241 03/14/24  0937 02/22/24  1031 01/17/24  0833 12/13/23  1326 11/03/23  1018 10/30/23  1043 09/14/23  0942 08/23/23  1028   GLUCOSE mg/dL 97 103 103* 110* 92 110* 100*   < > 108* 114*   < > 114*   SODIUM mmol/L 138 137 141 141 139 139 141   < > 140 141   < > 141   POTASSIUM mmol/L 3.4* 3.5 3.8  3.0* 4.4 4.4 4.2   < > 3.2* 2.7*   < > 3.6   TOTAL CO2 mmol/L  --  27  --   --   --   --   --   --   --   --   --   --    CO2 mmol/L 26.0  --  24.7 29.0 21.7* 27.0 24.2   < > 30.0* 32.0*   < > 25.0   CHLORIDE mmol/L 97* 97* 104 98 101 98 103   < > 100 100   < > 103   ANION GAP mmol/L 15.0 13  --  14.0  --  14.0  --   --  10.0 9.0  --  13.0   CREATININE mg/dL 0.94 0.9 0.90 1.11* 1.23* 1.09* 1.02*   < > 0.91 0.98   < > 0.96   BUN mg/dL 21 18 12 16 19 19 15   < > 10 11   < > 12   BUN / CREAT RATIO  22.3  --  13.3 14.4 15.4 17.4 14.7   < > 11.0 11.2   < > 12.5   CALCIUM mg/dL 9.9 9.3 9.3 10.8* 9.9 10.2 10.0   < > 8.5* 10.4   < > 9.6   ALK PHOS U/L 56 69  --  66 58 62  --   --  71 66   < > 58   TOTAL PROTEIN g/dL 7.2 7.4  --  7.3  --  7.9  --   --  7.1 6.9  --  7.2   ALT (SGPT) U/L 8 10  --  11 13 12  --   --  8 7   < > 7   AST (SGOT) U/L 14 14  --  15 13 17  --   --  15 12   < > 13   BILIRUBIN mg/dL 1.1 0.8  --  0.9 1.0 1.0  --   --  1.0 1.1   < > 1.0   ALBUMIN g/dL 4.3 4.3  --  4.3 4.3 4.5  --   --  4.1 4.0   < > 4.3   GLOBULIN gm/dL 2.9  --   --  3.0  --  3.4  --   --  3.0 2.9  --  2.9    < > = values in this interval not displayed.       Lab Results - Last 18 Months   Lab Units 08/22/24  1109 07/18/24  0931 02/22/24  1031 08/23/23  1028 03/13/23  0837   URIC ACID mg/dL  --  6.0*  --   --   --    CEA ng/mL 1.81  --  2.01 2.13 2.5       Lab Results - Last 18 Months   Lab Units 03/14/24  0937   TSH uIU/mL 1.180         Cee Quiles reports a pain score of 0.  No intervention indicated.       ASSESSMENT:  1. History of breast cancer    2. Stage 2 chronic kidney disease    3. Osteopenia of neck of right femur    4. Long term (current) use of aromatase inhibitors            1.   History of Carcinoma of the right breast, infiltrating lobular.    - Low recurrence score of 6, absolute benefit of chemo is less than 1%:  Current Stage: AJCC Stage IA (T1c N0, M0, G1)   Tumor size 1.91 cm.  Hormone Status: ER 98%, NE 92%,  HER-2/jeremy negative by FISH.  Baseline Node Status: 0/2 positive.  Treatment status: Post bilateral mastectomy and right sentinel node biopsy.  On adjuvant Femara 2.5 mg 03/22/2019 through present.  Plan for 10 years.   -Labs 8/22/24:  CA 27-29 stable at 23.3 and CEA 1.81  -Breast exam today unremarkable for palpable masses or evidence of recurrence   -Continues to follow with Dr. Hodges    2.  Chronic kidney disease Stage II    -BUN 21, Creatinine 0.94, GFR 62.6  -Continue follow up with [facility]    3.   Osteopenia   4. Long term use of AI  - Taking Femara 2.5 mg 03/22/2019 through present.  Plan for 10 years.   -No s/s of toxicity.  Monitor for hot flashes,osteonecrosis  -On denosumab (Prolia) q 6 months.   -Last prolia  April 2024.       -DEXA April 5, 2023: Femoral neck T Score -1.1, Lumbar Spine T Score 2.1        PLAN:  Stable for observation  Schedule denosumab 60 mg subcutaneously every 6 months  -Bone density improved and still on Femara.  Monitor for osteonecrosis of the jaw.  -Continue ongoing management per primary care physician and other specialists.  Return to office in 6 months with labs one week before appt with CBC with differential, CEA, CA27-29, and CMP, Mag, Phos  Plan of care discussed with patient.  Understanding expressed.  Patient agreeable to proceed.      Advance Care Planning   ACP discussion was declined by the patient. Patient does not have an advance directive, information provided.    [facility]  08/29/2024

## 2024-09-13 ENCOUNTER — OFFICE VISIT (OUTPATIENT)
Dept: FAMILY MEDICINE CLINIC | Facility: CLINIC | Age: 79
End: 2024-09-13
Payer: MEDICARE

## 2024-09-13 VITALS
OXYGEN SATURATION: 96 % | SYSTOLIC BLOOD PRESSURE: 122 MMHG | DIASTOLIC BLOOD PRESSURE: 72 MMHG | BODY MASS INDEX: 35.04 KG/M2 | HEIGHT: 61 IN | TEMPERATURE: 97.4 F | RESPIRATION RATE: 19 BRPM | WEIGHT: 185.6 LBS | HEART RATE: 74 BPM

## 2024-09-13 DIAGNOSIS — E03.9 ACQUIRED HYPOTHYROIDISM: ICD-10-CM

## 2024-09-13 DIAGNOSIS — E11.22 TYPE 2 DIABETES MELLITUS WITH STAGE 3 CHRONIC KIDNEY DISEASE, WITHOUT LONG-TERM CURRENT USE OF INSULIN, UNSPECIFIED WHETHER STAGE 3A OR 3B CKD: ICD-10-CM

## 2024-09-13 DIAGNOSIS — N18.30 TYPE 2 DIABETES MELLITUS WITH STAGE 3 CHRONIC KIDNEY DISEASE, WITHOUT LONG-TERM CURRENT USE OF INSULIN, UNSPECIFIED WHETHER STAGE 3A OR 3B CKD: ICD-10-CM

## 2024-09-13 DIAGNOSIS — E78.2 MIXED HYPERLIPIDEMIA: ICD-10-CM

## 2024-09-13 DIAGNOSIS — I10 ESSENTIAL HYPERTENSION: Primary | ICD-10-CM

## 2024-09-13 PROBLEM — Z86.0101 HX OF ADENOMATOUS COLONIC POLYPS: Status: RESOLVED | Noted: 2022-08-03 | Resolved: 2024-09-13

## 2024-09-13 PROBLEM — Z86.010 HX OF ADENOMATOUS COLONIC POLYPS: Status: RESOLVED | Noted: 2022-08-03 | Resolved: 2024-09-13

## 2024-09-13 PROCEDURE — 1125F AMNT PAIN NOTED PAIN PRSNT: CPT

## 2024-09-13 PROCEDURE — 3078F DIAST BP <80 MM HG: CPT

## 2024-09-13 PROCEDURE — 3074F SYST BP LT 130 MM HG: CPT

## 2024-09-13 PROCEDURE — 99214 OFFICE O/P EST MOD 30 MIN: CPT

## 2024-09-13 RX ORDER — CALCITRIOL 0.25 UG/1
0.25 CAPSULE, LIQUID FILLED ORAL EVERY OTHER DAY
COMMUNITY
Start: 2024-07-25

## 2024-09-13 NOTE — ASSESSMENT & PLAN NOTE
Lipid abnormalities are stable    Plan:  Continue same medication/s without change.      Discussed medication dosage, use, side effects, and goals of treatment in detail.    Counseled patient on lifestyle modifications to help control hyperlipidemia.     Patient Treatment Goals:   Patient is on High intensity statin.     Followup in 6 months.

## 2024-09-13 NOTE — ASSESSMENT & PLAN NOTE
Patient chart indicates that she is Diabetic. A1C reviewed as far back as 2017 does not indicate that patient is diabetic. Will D/C glipizide at this time due to concern for increased hyperglycemic risk in elderly patients. Will recheck A1C in 3 months. If elevated will consider initation of sglt2 vs glipizide for kidney protection and to minimize risk of hypoglycemia. If normal will remove diabetes from patients active diagnosis.    192

## 2024-09-13 NOTE — PROGRESS NOTES
"Chief Complaint  Hypertension (Follow up for blood pressure, pt states medication for blood pressure is working well. No issues. ) and Diabetes (Pt states that her levels have bee staying level.)    Subjective      Cee Quiles presents to [facility]  HPI: Patient is a 79 y.o. female who is here today for 6 month follow up. Denies any new concerns today. Patient has history of hypertension, currently well controlled on Amlodpinie 5 mg and bystolic 20 mg. History of hyperlipidemia. Patient is on daily atorvastatin 40 mg daily. Patient with history of type 2 diabetes glipizide 2.5 mg daily. Patient with history of hypothyroid currently on levothyroxine. Receives prolia injections. Patient with history of breast cancer, currently on letrozole 2.5 provided by oncology.   Denies any side effects with medication.     Patient brought in list of medication that has some different doses than what is in chart. Chart indicates that patient is on bumex and patient reporting lasix.     Objective   Vital Signs:  /72 (BP Location: Right arm, Patient Position: Sitting, Cuff Size: Adult)   Pulse 74   Temp 97.4 °F (36.3 °C) (Skin)   Resp 19   Ht 154.9 cm (60.98\")   Wt 84.2 kg (185 lb 9.6 oz)   SpO2 96%   BMI 35.09 kg/m²   Estimated body mass index is 35.09 kg/m² as calculated from the following:    Height as of this encounter: 154.9 cm (60.98\").    Weight as of this encounter: 84.2 kg (185 lb 9.6 oz).            Physical Exam  Constitutional:       General: She is not in acute distress.     Appearance: She is not ill-appearing.   Cardiovascular:      Rate and Rhythm: Normal rate and regular rhythm.      Heart sounds: Normal heart sounds. No murmur heard.     No friction rub. No gallop.   Pulmonary:      Effort: Pulmonary effort is normal. No respiratory distress.      Breath sounds: Normal breath sounds. No wheezing, rhonchi or rales.   Abdominal:      General: Abdomen is flat. " Bowel sounds are normal. There is no distension.      Tenderness: There is no abdominal tenderness.   Skin:     General: Skin is warm and dry.        Result Review :  The following labs were reviewed and discussed with the patient by Ranjith Aquino MD on 09/13/2024:   Latest Reference Range & Units 08/22/24 11:09   Sodium 136 - 145 mmol/L 138   Potassium 3.5 - 5.2 mmol/L 3.4 (L)   Chloride 98 - 107 mmol/L 97 (L)   CO2 22.0 - 29.0 mmol/L 26.0   Anion Gap 5.0 - 15.0 mmol/L 15.0   BUN 8 - 23 mg/dL 21   Creatinine 0.57 - 1.00 mg/dL 0.94   BUN/Creatinine Ratio 7.0 - 25.0  22.3   eGFR >60.0 mL/min/1.73 62.6   Glucose 65 - 99 mg/dL 97   Calcium 8.6 - 10.5 mg/dL 9.9   Magnesium 1.6 - 2.4 mg/dL 1.5 (L)   Phosphorus 2.5 - 4.5 mg/dL 4.4   Alkaline Phosphatase 39 - 117 U/L 56   Total Protein 6.0 - 8.5 g/dL 7.2   Albumin 3.5 - 5.2 g/dL 4.3   Globulin gm/dL 2.9   A/G Ratio g/dL 1.5   AST (SGOT) 1 - 32 U/L 14   ALT (SGPT) 1 - 33 U/L 8   Total Bilirubin 0.0 - 1.2 mg/dL 1.1   (L): Data is abnormally low       Latest Reference Range & Units 08/22/24 11:09   WBC 3.40 - 10.80 10*3/mm3 5.53   RBC 3.77 - 5.28 10*6/mm3 4.32   Hemoglobin 12.0 - 15.9 g/dL 12.9   Hematocrit 34.0 - 46.6 % 38.9   Platelets 140 - 450 10*3/mm3 242   RDW 12.3 - 15.4 % 13.4   MCV 79.0 - 97.0 fL 90.0   MCH 26.6 - 33.0 pg 29.9   MCHC 31.5 - 35.7 g/dL 33.2   MPV 6.0 - 12.0 fL 8.8   RDW-SD 37.0 - 54.0 fl 44.2      Latest Reference Range & Units 07/18/24 09:31   25 Hydroxy, Vitamin D >=30 ng/mL 57.9 (E)   (E): External lab result     Latest Reference Range & Units 03/14/24 09:37   Hemoglobin A1C 4.80 - 5.60 % 5.30      Latest Reference Range & Units 10/21/16 08:11   Hemoglobin A1C % 5.50     Assessment         Essential hypertension  Hypertension is stable and controlled  Continue current treatment regimen.  Blood pressure will be reassessed in 6 months.  Mixed hyperlipidemia   Lipid abnormalities are stable    Plan:  Continue same medication/s without change.       Discussed medication dosage, use, side effects, and goals of treatment in detail.    Counseled patient on lifestyle modifications to help control hyperlipidemia.     Patient Treatment Goals:   Patient is on High intensity statin.     Followup in 6 months.  Type 2 diabetes mellitus with stage 3 chronic kidney disease, without long-term current use of insulin, unspecified whether stage 3a or 3b CKD  Patient chart indicates that she is Diabetic. A1C reviewed as far back as 2017 does not indicate that patient is diabetic. Will D/C glipizide at this time due to concern for increased hyperglycemic risk in elderly patients. Will recheck A1C in 3 months. If elevated will consider initation of sglt2 vs glipizide for kidney protection and to minimize risk of hypoglycemia. If normal will remove diabetes from patients active diagnosis.   Acquired hypothyroidism  TSH wnl. Will continue with levothyroxine at current dose and frequency.     No orders of the defined types were placed in this encounter.            Follow Up   Return in about 3 months (around 12/13/2024) for A1c recheck after med change, med reconcilliation. .  Patient was given instructions and counseling regarding her condition or for health maintenance advice. Please see specific information pulled into the AVS if appropriate.   Request patient to bring in medication bottles for reconcilliation.

## 2024-09-13 NOTE — PATIENT INSTRUCTIONS
Please bring in all of the pill bottles for the medications that you are taking at home.    Stop Taking glipizide.

## 2024-10-30 ENCOUNTER — LAB (OUTPATIENT)
Dept: LAB | Facility: HOSPITAL | Age: 79
End: 2024-10-30
Payer: MEDICARE

## 2024-10-30 ENCOUNTER — TELEPHONE (OUTPATIENT)
Dept: ONCOLOGY | Facility: CLINIC | Age: 79
End: 2024-10-30
Payer: MEDICARE

## 2024-10-30 ENCOUNTER — INFUSION (OUTPATIENT)
Dept: ONCOLOGY | Facility: HOSPITAL | Age: 79
End: 2024-10-30
Payer: MEDICARE

## 2024-10-30 VITALS
WEIGHT: 183 LBS | RESPIRATION RATE: 18 BRPM | BODY MASS INDEX: 34.55 KG/M2 | OXYGEN SATURATION: 95 % | DIASTOLIC BLOOD PRESSURE: 62 MMHG | HEIGHT: 61 IN | SYSTOLIC BLOOD PRESSURE: 149 MMHG | TEMPERATURE: 97.3 F | HEART RATE: 65 BPM

## 2024-10-30 DIAGNOSIS — M85.851 OSTEOPENIA OF NECK OF RIGHT FEMUR: ICD-10-CM

## 2024-10-30 DIAGNOSIS — C50.211 MALIGNANT NEOPLASM OF UPPER-INNER QUADRANT OF RIGHT BREAST IN FEMALE, ESTROGEN RECEPTOR POSITIVE: ICD-10-CM

## 2024-10-30 DIAGNOSIS — Z79.811 LONG TERM (CURRENT) USE OF AROMATASE INHIBITORS: Primary | ICD-10-CM

## 2024-10-30 DIAGNOSIS — E87.6 HYPOKALEMIA: Primary | ICD-10-CM

## 2024-10-30 DIAGNOSIS — Z79.811 LONG TERM (CURRENT) USE OF AROMATASE INHIBITORS: ICD-10-CM

## 2024-10-30 DIAGNOSIS — Z17.0 MALIGNANT NEOPLASM OF UPPER-INNER QUADRANT OF RIGHT BREAST IN FEMALE, ESTROGEN RECEPTOR POSITIVE: ICD-10-CM

## 2024-10-30 LAB
ALBUMIN SERPL-MCNC: 4 G/DL (ref 3.5–5.2)
ALBUMIN/GLOB SERPL: 1.4 G/DL
ALP SERPL-CCNC: 67 U/L (ref 39–117)
ALT SERPL W P-5'-P-CCNC: 9 U/L (ref 1–33)
ANION GAP SERPL CALCULATED.3IONS-SCNC: 14 MMOL/L (ref 5–15)
AST SERPL-CCNC: 15 U/L (ref 1–32)
BILIRUB SERPL-MCNC: 1 MG/DL (ref 0–1.2)
BUN SERPL-MCNC: 17 MG/DL (ref 8–23)
BUN/CREAT SERPL: 15.9 (ref 7–25)
CALCIUM SPEC-SCNC: 10.5 MG/DL (ref 8.6–10.5)
CHLORIDE SERPL-SCNC: 96 MMOL/L (ref 98–107)
CO2 SERPL-SCNC: 29 MMOL/L (ref 22–29)
CREAT SERPL-MCNC: 1.07 MG/DL (ref 0.57–1)
EGFRCR SERPLBLD CKD-EPI 2021: 53.6 ML/MIN/1.73
GLOBULIN UR ELPH-MCNC: 2.9 GM/DL
GLUCOSE SERPL-MCNC: 127 MG/DL (ref 65–99)
MAGNESIUM SERPL-MCNC: 1.5 MG/DL (ref 1.6–2.4)
PHOSPHATE SERPL-MCNC: 4.3 MG/DL (ref 2.5–4.5)
POTASSIUM SERPL-SCNC: 2.9 MMOL/L (ref 3.5–5.2)
PROT SERPL-MCNC: 6.9 G/DL (ref 6–8.5)
SODIUM SERPL-SCNC: 139 MMOL/L (ref 136–145)

## 2024-10-30 PROCEDURE — 83735 ASSAY OF MAGNESIUM: CPT

## 2024-10-30 PROCEDURE — 36415 COLL VENOUS BLD VENIPUNCTURE: CPT

## 2024-10-30 PROCEDURE — G0463 HOSPITAL OUTPT CLINIC VISIT: HCPCS

## 2024-10-30 PROCEDURE — 80053 COMPREHEN METABOLIC PANEL: CPT

## 2024-10-30 PROCEDURE — 84100 ASSAY OF PHOSPHORUS: CPT

## 2024-10-30 RX ORDER — POTASSIUM CHLORIDE 1500 MG/1
20 TABLET, EXTENDED RELEASE ORAL 2 TIMES DAILY
Qty: 6 TABLET | Refills: 0 | Status: SHIPPED | OUTPATIENT
Start: 2024-10-30

## 2024-10-30 RX ORDER — POTASSIUM CHLORIDE 600 MG/1
8 TABLET, FILM COATED, EXTENDED RELEASE ORAL 2 TIMES DAILY
COMMUNITY
End: 2024-10-30

## 2024-10-30 NOTE — TELEPHONE ENCOUNTER
Notified Cee that [facility] has sent in a prescription for potassium for her to take for 3 days.  Also, wants her to get magnesium IV.  Scheduled for 12:30 on 11/1/24,  will recheck potassium level on Friday also.  Lab appointment will be at noon.  Verbalized understanding.

## 2024-10-30 NOTE — PROGRESS NOTES
Pt here for Prolia.  Mag 1.5, K+ 2.9.  s/w Niecy Crabtree RN with [facility] and orders received to hold prolia today and the office will call pt regarding low mag and potassium.  Pt v/u. S. LU Rae

## 2024-10-31 RX ORDER — SODIUM CHLORIDE 9 MG/ML
20 INJECTION, SOLUTION INTRAVENOUS ONCE
Status: CANCELLED | OUTPATIENT
Start: 2024-11-01

## 2024-10-31 RX ORDER — MAGNESIUM SULFATE 1 G/100ML
1 INJECTION INTRAVENOUS ONCE
Status: CANCELLED | OUTPATIENT
Start: 2024-11-01

## 2024-11-01 ENCOUNTER — INFUSION (OUTPATIENT)
Dept: ONCOLOGY | Facility: HOSPITAL | Age: 79
End: 2024-11-01
Payer: MEDICARE

## 2024-11-01 ENCOUNTER — LAB (OUTPATIENT)
Dept: LAB | Facility: HOSPITAL | Age: 79
End: 2024-11-01
Payer: MEDICARE

## 2024-11-01 VITALS
HEIGHT: 61 IN | SYSTOLIC BLOOD PRESSURE: 125 MMHG | TEMPERATURE: 97.8 F | WEIGHT: 184.2 LBS | HEART RATE: 57 BPM | RESPIRATION RATE: 16 BRPM | OXYGEN SATURATION: 96 % | BODY MASS INDEX: 34.78 KG/M2 | DIASTOLIC BLOOD PRESSURE: 56 MMHG

## 2024-11-01 DIAGNOSIS — E83.42 HYPOMAGNESEMIA: Primary | ICD-10-CM

## 2024-11-01 DIAGNOSIS — E87.6 HYPOKALEMIA: ICD-10-CM

## 2024-11-01 LAB — POTASSIUM SERPL-SCNC: 3.6 MMOL/L (ref 3.5–5.2)

## 2024-11-01 PROCEDURE — 96365 THER/PROPH/DIAG IV INF INIT: CPT

## 2024-11-01 PROCEDURE — 84132 ASSAY OF SERUM POTASSIUM: CPT

## 2024-11-01 PROCEDURE — 25010000002 MAGNESIUM SULFATE IN D5W 1G/100ML (PREMIX) 1-5 GM/100ML-% SOLUTION: Performed by: NURSE PRACTITIONER

## 2024-11-01 PROCEDURE — 36415 COLL VENOUS BLD VENIPUNCTURE: CPT

## 2024-11-01 RX ORDER — SODIUM CHLORIDE 9 MG/ML
20 INJECTION, SOLUTION INTRAVENOUS ONCE
Status: DISCONTINUED | OUTPATIENT
Start: 2024-11-01 | End: 2024-11-01

## 2024-11-01 RX ORDER — MAGNESIUM SULFATE 1 G/100ML
1 INJECTION INTRAVENOUS ONCE
Status: COMPLETED | OUTPATIENT
Start: 2024-11-01 | End: 2024-11-01

## 2024-11-01 RX ADMIN — MAGNESIUM SULFATE 1 G: 1 INJECTION INTRAVENOUS at 12:48

## 2024-12-06 RX ORDER — ATORVASTATIN CALCIUM 40 MG/1
40 TABLET, FILM COATED ORAL DAILY
Qty: 90 TABLET | Refills: 3 | Status: SHIPPED | OUTPATIENT
Start: 2024-12-06

## 2024-12-16 ENCOUNTER — OFFICE VISIT (OUTPATIENT)
Dept: FAMILY MEDICINE CLINIC | Facility: CLINIC | Age: 79
End: 2024-12-16
Payer: MEDICARE

## 2024-12-16 VITALS
HEART RATE: 62 BPM | HEIGHT: 61 IN | DIASTOLIC BLOOD PRESSURE: 86 MMHG | WEIGHT: 179 LBS | BODY MASS INDEX: 33.79 KG/M2 | SYSTOLIC BLOOD PRESSURE: 140 MMHG | OXYGEN SATURATION: 97 %

## 2024-12-16 DIAGNOSIS — E87.6 HYPOKALEMIA: ICD-10-CM

## 2024-12-16 DIAGNOSIS — M85.851 OSTEOPENIA OF NECK OF RIGHT FEMUR: ICD-10-CM

## 2024-12-16 DIAGNOSIS — I10 ESSENTIAL HYPERTENSION: ICD-10-CM

## 2024-12-16 DIAGNOSIS — N18.30 TYPE 2 DIABETES MELLITUS WITH STAGE 3 CHRONIC KIDNEY DISEASE, WITHOUT LONG-TERM CURRENT USE OF INSULIN, UNSPECIFIED WHETHER STAGE 3A OR 3B CKD: Primary | ICD-10-CM

## 2024-12-16 DIAGNOSIS — N18.31 STAGE 3A CHRONIC KIDNEY DISEASE: ICD-10-CM

## 2024-12-16 DIAGNOSIS — E03.9 ACQUIRED HYPOTHYROIDISM: ICD-10-CM

## 2024-12-16 DIAGNOSIS — E78.2 MIXED HYPERLIPIDEMIA: ICD-10-CM

## 2024-12-16 DIAGNOSIS — E11.22 TYPE 2 DIABETES MELLITUS WITH STAGE 3 CHRONIC KIDNEY DISEASE, WITHOUT LONG-TERM CURRENT USE OF INSULIN, UNSPECIFIED WHETHER STAGE 3A OR 3B CKD: Primary | ICD-10-CM

## 2024-12-16 LAB
EXPIRATION DATE: ABNORMAL
HBA1C MFR BLD: 5.8 % (ref 4.5–5.7)
Lab: ABNORMAL

## 2024-12-16 PROCEDURE — 99214 OFFICE O/P EST MOD 30 MIN: CPT

## 2024-12-16 PROCEDURE — 3044F HG A1C LEVEL LT 7.0%: CPT

## 2024-12-16 PROCEDURE — 83037 HB GLYCOSYLATED A1C HOME DEV: CPT

## 2024-12-16 PROCEDURE — 3077F SYST BP >= 140 MM HG: CPT

## 2024-12-16 PROCEDURE — G2211 COMPLEX E/M VISIT ADD ON: HCPCS

## 2024-12-16 PROCEDURE — 1125F AMNT PAIN NOTED PAIN PRSNT: CPT

## 2024-12-16 PROCEDURE — 3079F DIAST BP 80-89 MM HG: CPT

## 2024-12-16 RX ORDER — FUROSEMIDE 20 MG/1
20 TABLET ORAL DAILY
COMMUNITY

## 2024-12-16 NOTE — PROGRESS NOTES
"Chief Complaint  Diabetes (A1C 5.8), Hypertension, Hypothyroidism, and Hyperlipidemia    Subjective      Cee Quiles presents to [facility]  History of Present Illness  The patient is a 79-year-old female who presents today for a follow-up visit.    She was previously taken off glipizide due to consistently low A1c levels over several years. She has been off glipizide for approximately 3 months. She does not regularly monitor her blood glucose levels but reports no significant fluctuations. She has had two [place] parties this year and her blood glucose levels are still controlled.    She does not monitor her blood pressure at home but notes that it tends to be elevated during office visits.    She is currently on Lasix and takes an over-the-counter potassium supplement. Her potassium levels were found to be low in October 2024, at which time she was prescribed a 3-day course of potassium supplements. She is under the care of a nephrologist, who has reported satisfactory progress. Her GFR has improved from stage 3B to stage 3A.    She has been on a consistent dose of thyroid medication for several years.    She is on Prolia injections for osteoporosis or osteopenia and is doing well with that. She also takes calcium and vitamin D supplements.    MEDICATIONS  Current: atorvastatin, Lasix, over-the-counter potassium supplement, Prolia injection, calcium and vitamin D supplement, thyroid medicine  Discontinued: glipizide      Objective   Vital Signs:  /86   Pulse 62   Ht 154.9 cm (61\")   Wt 81.2 kg (179 lb)   SpO2 97%   BMI 33.82 kg/m²   Estimated body mass index is 33.82 kg/m² as calculated from the following:    Height as of this encounter: 154.9 cm (61\").    Weight as of this encounter: 81.2 kg (179 lb).            Physical Exam     Physical Exam  Vital Signs  Blood pressure is 140/80.      Result Review :  The following labs/imaging/notes were reviewed and " discussed with the patient by Ranjith Aquino MD on 12/16/2024:     Latest Reference Range & Units 03/14/24 09:37 12/16/24 12:09   Hemoglobin A1C 4.5 - 5.7 % 5.30 5.8 !   !: Data is abnormal       Latest Reference Range & Units 10/30/24 13:30   Sodium 136 - 145 mmol/L 139   Potassium 3.5 - 5.2 mmol/L 2.9 (L)   Chloride 98 - 107 mmol/L 96 (L)   CO2 22.0 - 29.0 mmol/L 29.0   Anion Gap 5.0 - 15.0 mmol/L 14.0   BUN 8 - 23 mg/dL 17   Creatinine 0.57 - 1.00 mg/dL 1.07 (H)   BUN/Creatinine Ratio 7.0 - 25.0  15.9   eGFR >60.0 mL/min/1.73 53.6 (L)   Glucose 65 - 99 mg/dL 127 (H)   Calcium 8.6 - 10.5 mg/dL 10.5   Magnesium 1.6 - 2.4 mg/dL 1.5 (L)   Phosphorus 2.5 - 4.5 mg/dL 4.3   Alkaline Phosphatase 39 - 117 U/L 67   Total Protein 6.0 - 8.5 g/dL 6.9   Albumin 3.5 - 5.2 g/dL 4.0   Globulin gm/dL 2.9   A/G Ratio g/dL 1.4   AST (SGOT) 1 - 32 U/L 15   ALT (SGPT) 1 - 33 U/L 9   Total Bilirubin 0.0 - 1.2 mg/dL 1.0   (L): Data is abnormally low  (H): Data is abnormally high\     Latest Reference Range & Units 03/14/24 09:37   Total Cholesterol 0 - 200 mg/dL 138   HDL Cholesterol 40 - 60 mg/dL 44   LDL Cholesterol  0 - 100 mg/dL 67   Triglycerides 0 - 150 mg/dL 157 (H)   Chol/HDL Ratio  3.14   VLDL Cholesterol Ashish 5 - 40 mg/dL 27   (H): Data is abnormally high    Assessment      Diagnoses and all orders for this visit:    1. Type 2 diabetes mellitus with stage 3 chronic kidney disease, without long-term current use of insulin, unspecified whether stage 3a or 3b CKD (Primary)  -     POC Glycated Hemoglobin, Total    2. Mixed hyperlipidemia    3. Hypokalemia  -     Comprehensive Metabolic Panel    4. Acquired hypothyroidism    5. Stage 3a chronic kidney disease    6. Essential hypertension    7. Osteopenia of neck of right femur             Assessment & Plan  1. Hypertension.  Her blood pressure readings have been consistently elevated during office visits, with today's reading at 140/80. She is advised to monitor her blood pressure at  home. If systolic readings consistently exceed 135 or diastolic readings consistently exceed 80, she should inform the office. At that point, initiation of lisinopril therapy will be considered, with a discussion about potential side effects, including the possibility of increased potassium levels.    2. Hypokalemia.  Her potassium levels were noted to be low in October 2024. She is currently taking an over-the-counter potassium supplement, but the dosage is unclear. A recheck of her potassium levels will be conducted today. A comprehensive metabolic panel (CMP) will also be ordered. If her potassium levels remain low, an increase in her potassium supplement dosage will be considered.    3. Hyperlipidemia.  Her cholesterol levels are within normal limits. She is currently on atorvastatin 40 mg and is responding well to the treatment.    4. Osteoporosis.  She is continuing with Prolia injections and is also taking calcium and vitamin D supplements. She is advised to continue these supplements to support bone health.    5. Hypothyroidism.  Her thyroid condition appears to be stable on her current medication regimen. No adjustments to her thyroid medication are planned at this time.    Follow-up  The patient will follow up in 3 months for a Medicare wellness visit.    Orders Placed This Encounter   Procedures    Comprehensive Metabolic Panel     Order Specific Question:   Release to patient     Answer:   Routine Release [[id number]]    POC Glycated Hemoglobin, Total     Order Specific Question:   Release to patient     Answer:   Routine Release [[id number]]       Follow Up   Return in about 3 months (around 3/16/2025) for Medicare Wellness,HTN, Hypokalemia, Hypothyroid, HLD.  Patient was given instructions and counseling regarding her condition or for health maintenance advice. Please see specific information pulled into the AVS if appropriate.         Patient or patient representative verbalized consent for the use of  Ambient Listening during the visit with  Ranjith Aquino MD for chart documentation. 12/16/2024  13:01 CST

## 2024-12-19 LAB
ALBUMIN SERPL-MCNC: 4.3 G/DL (ref 3.5–5.2)
ALBUMIN/GLOB SERPL: 1.6 G/DL
ALP SERPL-CCNC: 77 U/L (ref 39–117)
ALT SERPL-CCNC: 8 U/L (ref 1–33)
AST SERPL-CCNC: 16 U/L (ref 1–32)
BILIRUB SERPL-MCNC: 0.9 MG/DL (ref 0–1.2)
BUN SERPL-MCNC: 18 MG/DL (ref 8–23)
BUN/CREAT SERPL: 13.3 (ref 7–25)
CALCIUM SERPL-MCNC: 10.9 MG/DL (ref 8.6–10.5)
CHLORIDE SERPL-SCNC: 96 MMOL/L (ref 98–107)
CO2 SERPL-SCNC: 30.5 MMOL/L (ref 22–29)
CREAT SERPL-MCNC: 1.35 MG/DL (ref 0.57–1)
EGFRCR SERPLBLD CKD-EPI 2021: 40.6 ML/MIN/1.73
GLOBULIN SER CALC-MCNC: 2.7 GM/DL
GLUCOSE SERPL-MCNC: 134 MG/DL (ref 65–99)
POTASSIUM SERPL-SCNC: 3 MMOL/L (ref 3.5–5.2)
PROT SERPL-MCNC: 7 G/DL (ref 6–8.5)
SODIUM SERPL-SCNC: 138 MMOL/L (ref 136–145)

## 2024-12-19 RX ORDER — ATORVASTATIN CALCIUM 40 MG/1
40 TABLET, FILM COATED ORAL DAILY
Qty: 90 TABLET | Refills: 3 | Status: SHIPPED | OUTPATIENT
Start: 2024-12-19

## 2024-12-29 DIAGNOSIS — E87.6 HYPOKALEMIA: Primary | ICD-10-CM

## 2024-12-29 RX ORDER — POTASSIUM CHLORIDE 1500 MG/1
20 TABLET, EXTENDED RELEASE ORAL 2 TIMES DAILY
Qty: 6 TABLET | Refills: 0 | Status: SHIPPED | OUTPATIENT
Start: 2024-12-29

## 2025-03-19 ENCOUNTER — OFFICE VISIT (OUTPATIENT)
Dept: FAMILY MEDICINE CLINIC | Facility: CLINIC | Age: 80
End: 2025-03-19
Payer: MEDICARE

## 2025-03-19 VITALS
SYSTOLIC BLOOD PRESSURE: 132 MMHG | OXYGEN SATURATION: 96 % | HEIGHT: 61 IN | HEART RATE: 67 BPM | BODY MASS INDEX: 33.04 KG/M2 | WEIGHT: 175 LBS | DIASTOLIC BLOOD PRESSURE: 76 MMHG

## 2025-03-19 DIAGNOSIS — I10 ESSENTIAL HYPERTENSION: Primary | ICD-10-CM

## 2025-03-19 DIAGNOSIS — E78.2 MIXED HYPERLIPIDEMIA: ICD-10-CM

## 2025-03-19 DIAGNOSIS — N18.32 STAGE 3B CHRONIC KIDNEY DISEASE: ICD-10-CM

## 2025-03-19 DIAGNOSIS — M85.851 OSTEOPENIA OF NECK OF RIGHT FEMUR: ICD-10-CM

## 2025-03-19 DIAGNOSIS — E03.9 ACQUIRED HYPOTHYROIDISM: ICD-10-CM

## 2025-03-19 LAB
ALBUMIN SERPL-MCNC: 4.3 G/DL (ref 3.5–5.2)
ALBUMIN/GLOB SERPL: 1.4 G/DL
ALP SERPL-CCNC: 83 U/L (ref 39–117)
ALT SERPL-CCNC: 9 U/L (ref 1–33)
AST SERPL-CCNC: 18 U/L (ref 1–32)
BILIRUB SERPL-MCNC: 0.9 MG/DL (ref 0–1.2)
BUN SERPL-MCNC: 17 MG/DL (ref 8–23)
BUN/CREAT SERPL: 14.9 (ref 7–25)
CALCIUM SERPL-MCNC: 10.7 MG/DL (ref 8.6–10.5)
CHLORIDE SERPL-SCNC: 98 MMOL/L (ref 98–107)
CHOLEST SERPL-MCNC: 120 MG/DL (ref 0–200)
CO2 SERPL-SCNC: 27.6 MMOL/L (ref 22–29)
CREAT SERPL-MCNC: 1.14 MG/DL (ref 0.57–1)
EGFRCR SERPLBLD CKD-EPI 2021: 49.4 ML/MIN/1.73
GLOBULIN SER CALC-MCNC: 3.1 GM/DL
GLUCOSE SERPL-MCNC: 135 MG/DL (ref 65–99)
HDLC SERPL-MCNC: 39 MG/DL (ref 40–60)
LDLC SERPL CALC-MCNC: 57 MG/DL (ref 0–100)
POTASSIUM SERPL-SCNC: 3.5 MMOL/L (ref 3.5–5.2)
PROT SERPL-MCNC: 7.4 G/DL (ref 6–8.5)
SODIUM SERPL-SCNC: 139 MMOL/L (ref 136–145)
TRIGL SERPL-MCNC: 139 MG/DL (ref 0–150)
TSH SERPL DL<=0.005 MIU/L-ACNC: 1.06 UIU/ML (ref 0.27–4.2)
VLDLC SERPL CALC-MCNC: 24 MG/DL (ref 5–40)

## 2025-03-19 RX ORDER — POTASSIUM CHLORIDE 1500 MG/1
20 TABLET, EXTENDED RELEASE ORAL 2 TIMES DAILY
Qty: 90 TABLET | Refills: 1 | Status: SHIPPED | OUTPATIENT
Start: 2025-03-19 | End: 2025-03-20 | Stop reason: SDUPTHER

## 2025-03-19 NOTE — PROGRESS NOTES
Subjective   The ABCs of the Annual Wellness Visit  Medicare Wellness Visit      Cee Quiles is a 80 y.o. patient who presents for a Medicare Wellness Visit.    The following portions of the patient's history were reviewed and   updated as appropriate: allergies, current medications, past family history, past medical history, past social history, past surgical history, and problem list.    Compared to one year ago, the patient's physical   health is the same.  Compared to one year ago, the patient's mental   health is the same.    Recent Hospitalizations:  She was not admitted to the hospital during the last year.     Current Medical Providers:  Patient Care Team:  Ranjith Aquino MD as PCP - General (Family Medicine)  Andrea Cartwright MD as Consulting Physician (Hematology and Oncology)  Michael Hodges MD as Surgeon (General Surgery)    Outpatient Medications Prior to Visit   Medication Sig Dispense Refill    amLODIPine (NORVASC) 5 MG tablet Take 1 tablet by mouth Daily.      atorvastatin (LIPITOR) 40 MG tablet Take 1 tablet by mouth Daily. 90 tablet 3    calcium citrate-vitamin d (CALCITRATE) 315-250 MG-UNIT tablet tablet Take  by mouth Daily.      Denosumab (PROLIA SC) Inject  under the skin into the appropriate area as directed.      furosemide (LASIX) 20 MG tablet Take 1 tablet by mouth Daily.      letrozole (FEMARA) 2.5 MG tablet TAKE 1 TABLET BY MOUTH DAILY 90 tablet 3    levothyroxine (SYNTHROID, LEVOTHROID) 100 MCG tablet TAKE 1 TABLET BY MOUTH DAILY 90 tablet 3    Magnesium Oxide -Mg Supplement 400 MG capsule Take 1 capsule by mouth Every 12 (Twelve) Hours.      meclizine (ANTIVERT) 25 MG tablet Take 1 tablet by mouth 3 (Three) Times a Day As Needed for Dizziness.      nebivolol (BYSTOLIC) 20 MG tablet TAKE 1 TABLET BY MOUTH DAILY 90 tablet 3    potassium chloride (KLOR-CON M20) 20 MEQ CR tablet Take 1 tablet by mouth 2 (Two) Times a Day. (Patient taking differently: Take 1 tablet by mouth 2 (Two)  "Times a Day. Pt takes 1 tab daily) 6 tablet 0    calcitriol (ROCALTROL) 0.25 MCG capsule Take 1 capsule by mouth Every Other Day.       No facility-administered medications prior to visit.     No opioid medication identified on active medication list. I have reviewed chart for other potential  high risk medication/s and harmful drug interactions in the elderly.      Aspirin is not on active medication list.  Aspirin use is not indicated based on review of current medical condition/s. Risk of harm outweighs potential benefits.  .    Patient Active Problem List   Diagnosis    Essential hypertension    Acquired hypothyroidism    Chronic pain of right knee    CKD (chronic kidney disease) stage 3, GFR 30-59 ml/min    Mixed hyperlipidemia    Morbidly obese    Breast CA    Postmenopausal    Hypomagnesemia    Osteopenia of neck of femur    Malignant neoplasm of upper-inner quadrant of right breast in female, estrogen receptor positive    Long term (current) use of aromatase inhibitors    Hypokalemia    Rash     Advance Care Planning Advance Directive is not on file.  ACP discussion was held with the patient during this visit. Patient does not have an advance directive, information provided.            Objective   Vitals:    03/19/25 0812   BP: 132/76   Pulse: 67   SpO2: 96%   Weight: 79.4 kg (175 lb)   Height: 154.9 cm (61\")   PainSc: 0-No pain       Estimated body mass index is 33.07 kg/m² as calculated from the following:    Height as of this encounter: 154.9 cm (61\").    Weight as of this encounter: 79.4 kg (175 lb).    BMI is >= 30 and <35. (Class 1 Obesity). The following options were offered after discussion;: weight loss educational material (shared in after visit summary) and exercise counseling/recommendations      Gait and Balance Evaluation:  Normal       Does the patient have evidence of cognitive impairment? No                                                                                                Health "  Risk Assessment    Smoking Status:  Social History     Tobacco Use   Smoking Status Never   Smokeless Tobacco Never     Alcohol Consumption:  Social History     Substance and Sexual Activity   Alcohol Use No       Fall Risk Screen  EMILEE Fall Risk Assessment was completed, and patient is at LOW risk for falls.Assessment completed on:3/19/2025    Depression Screening   Little interest or pleasure in doing things? Not at all   Feeling down, depressed, or hopeless? Not at all   PHQ-2 Total Score 0      Health Habits and Functional and Cognitive Screening:      3/19/2025     8:10 AM   Functional & Cognitive Status   Do you have difficulty preparing food and eating? No   Do you have difficulty bathing yourself, getting dressed or grooming yourself? No   Do you have difficulty using the toilet? No   Do you have difficulty moving around from place to place? Yes   Do you have trouble with steps or getting out of a bed or a chair? Yes   Current Diet Well Balanced Diet   Dental Exam Up to date   Eye Exam Not up to date   Exercise (times per week) 5 times per week   Current Exercises Include House Cleaning;Walking   Do you need help using the phone?  No   Are you deaf or do you have serious difficulty hearing?  No   Do you need help to go to places out of walking distance? Yes   Do you need help shopping? No   Do you need help preparing meals?  No   Do you need help with housework?  No   Do you need help with laundry? No   Do you need help taking your medications? No   Do you need help managing money? No   Do you ever drive or ride in a car without wearing a seat belt? Yes   Have you felt unusual stress, anger or loneliness in the last month? No   Who do you live with? Spouse   If you need help, do you have trouble finding someone available to you? No   Have you been bothered in the last four weeks by sexual problems? No   Do you have difficulty concentrating, remembering or making decisions? No           Age-appropriate  Screening Schedule:  Refer to the list below for future screening recommendations based on patient's age, sex and/or medical conditions. Orders for these recommended tests are listed in the plan section. The patient has been provided with a written plan.    Health Maintenance List  Health Maintenance   Topic Date Due    TDAP/TD VACCINES (1 - Tdap) Never done    ZOSTER VACCINE (1 of 2) Never done    Pneumococcal Vaccine 50+ (2 of 2 - PPSV23) 01/13/2020    RSV Vaccine - Adults (1 - 1-dose 75+ series) Never done    DIABETIC EYE EXAM  07/13/2024    ANNUAL WELLNESS VISIT  09/14/2024    LIPID PANEL  03/14/2025    URINE MICROALBUMIN-CREATININE RATIO (uACR)  03/14/2025    BMI FOLLOWUP  03/14/2025    DXA SCAN  04/05/2025    HEMOGLOBIN A1C  06/16/2025    COVID-19 Vaccine (4 - 2024-25 season) 04/03/2025    COLORECTAL CANCER SCREENING  08/29/2027    HEPATITIS C SCREENING  Completed    INFLUENZA VACCINE  Completed    MAMMOGRAM  Discontinued                                                                                                                                                CMS Preventative Services Quick Reference  Risk Factors Identified During Encounter  Chronic Pain: Natural history and expected course discussed. Questions answered.  Depression/Dysphoria:  no signs or symptoms of depression  Inactivity/Sedentary: Patient was advised to exercise at least 150 minutes a week per CDC recommendations.  Dental Screening Recommended  Vision Screening Recommended    The above risks/problems have been discussed with the patient.  Pertinent information has been shared with the patient in the After Visit Summary.  An After Visit Summary and PPPS were made available to the patient.    Follow Up:   Next Medicare Wellness visit to be scheduled in 1 year.         Additional E&M Note during same encounter follows:  Patient has additional, significant, and separately identifiable condition(s)/problem(s) that require work above and  beyond the Medicare Wellness Visit     Chief Complaint  Chief Complaint   Patient presents with    Medicare Wellness-subsequent    Hypertension    Hypothyroidism    Chronic Kidney Disease    Hyperlipidemia        Subjective      History of Present Illness  The patient is a 80-year-old female here for a Medicare wellness visit.    She reports no significant changes in her health status since her last visit. She has not required any hospitalizations within the past year. She does not experience any symptoms of depression. She has no known diagnosis of glaucoma.    She was previously on glipizide, which was discontinued in September 2024. She is currently not on any medication for diabetes or prediabetes. Her dietary habits include skipping breakfast and consuming diet soda.    She has been experiencing knee pain, which has remained consistent over time. This pain has limited her physical activity, including exercise. She does not seek medical attention for her knee pain. She reports difficulty with balance and stair climbing due to her knee condition. She has not experienced any falls related to her knee instability. She occasionally uses a cane for support when her knees are particularly painful or unstable.    She is under the care of a nephrologist at [facility] for stage 3 kidney disease. She was prescribed potassium supplements due to persistently low potassium levels. She continues to take Lasix.    She is on amlodipine 5 mg and nebivolol 20 mg for blood pressure management.    She is on levothyroxine for hypothyroidism. Her thyroid medication was increased once when she first started it, but it has been stable for the past 20 years.    She is on Prolia injections and takes vitamin D and calcium supplements.    SOCIAL HISTORY  She does not smoke or use alcohol.    MEDICATIONS  Current: Amlodipine, nebivolol, potassium, furosemide, levothyroxine, atorvastatin, Prolia injections, vitamin D, calcium.  Discontinued:  "Glipizide.          Objective   Vital Signs:  /76   Pulse 67   Ht 154.9 cm (61\")   Wt 79.4 kg (175 lb)   SpO2 96%   BMI 33.07 kg/m²     The following labs/imaging/notes were reviewed and discussed with the patient by Ranjith Aquino MD on 03/19/2025:            Physical Exam    Physical Exam          Assessment      Diagnoses and all orders for this visit:    1. Essential hypertension (Primary)    2. Mixed hyperlipidemia  -     Lipid Panel    3. Acquired hypothyroidism  -     TSH Rfx On Abnormal To Free T4    4. Stage 3b chronic kidney disease    5. Osteopenia of neck of right femur    Other orders  -     potassium chloride (KLOR-CON M20) 20 MEQ CR tablet; Take 1 tablet by mouth 2 (Two) Times a Day. Pt takes 1 tab daily  Dispense: 90 tablet; Refill: 1             Assessment & Plan  1. Medicare wellness visit.  Her condition remains stable with no significant changes observed. Labs will be conducted today to assess cholesterol levels and thyroid function.    2. Hypertension.  She is currently on amlodipine 5 mg and nebivolol 20 mg. Blood pressure management appears stable.    3. Hypothyroidism.  She is on levothyroxine. Thyroid function tests will be conducted today to ensure appropriate treatment.    4. Chronic kidney disease stage 3.  She is under the care of a nephrologist and is taking potassium 20 mEq once daily. Recent labs from January show stable potassium levels. The prescription for potassium has been renewed.    5. Prediabetes.  Her fasting glucose was 124, which is below the diabetic range of 126. Monitoring will continue.    6. Osteoporosis.  She is receiving Prolia injections and takes vitamin D and calcium supplements. No issues reported with the current treatment.    7. Knee pain.  She reports persistent knee pain, which limits her ability to exercise and climb stairs. She uses a cane occasionally for stability. Advised to be cautious while walking to prevent falls.    Follow-up  The patient " will follow up in 6 months.    Orders Placed This Encounter   Procedures    Lipid Panel     Release to patient:   Routine Release [[id number]]    TSH Rfx On Abnormal To Free T4     Release to patient:   Routine Release [[id number]]                 Follow Up   Return in about 6 months (around 9/19/2025) for htn, HLD, Hypothyroid, CKD3, osteopenia.  Patient was given instructions and counseling regarding her condition or for health maintenance advice. Please see specific information pulled into the AVS if appropriate.    Patient or patient representative verbalized consent for the use of Ambient Listening during the visit with  Ranjith Aquino MD for chart documentation. 3/19/2025  08:43 CDT

## 2025-03-20 RX ORDER — POTASSIUM CHLORIDE 1500 MG/1
20 TABLET, EXTENDED RELEASE ORAL DAILY
Qty: 90 TABLET | Refills: 1 | Status: SHIPPED | OUTPATIENT
Start: 2025-03-20

## 2025-05-01 ENCOUNTER — LAB (OUTPATIENT)
Dept: LAB | Facility: HOSPITAL | Age: 80
End: 2025-05-01
Payer: MEDICARE

## 2025-05-01 ENCOUNTER — OFFICE VISIT (OUTPATIENT)
Dept: ONCOLOGY | Facility: CLINIC | Age: 80
End: 2025-05-01
Payer: MEDICARE

## 2025-05-01 VITALS
RESPIRATION RATE: 16 BRPM | OXYGEN SATURATION: 96 % | TEMPERATURE: 97 F | HEIGHT: 61 IN | WEIGHT: 169.4 LBS | SYSTOLIC BLOOD PRESSURE: 118 MMHG | BODY MASS INDEX: 31.98 KG/M2 | HEART RATE: 76 BPM | DIASTOLIC BLOOD PRESSURE: 78 MMHG

## 2025-05-01 DIAGNOSIS — Z85.3 HISTORY OF BREAST CANCER: Primary | ICD-10-CM

## 2025-05-01 DIAGNOSIS — C50.211 MALIGNANT NEOPLASM OF UPPER-INNER QUADRANT OF RIGHT BREAST IN FEMALE, ESTROGEN RECEPTOR POSITIVE: ICD-10-CM

## 2025-05-01 DIAGNOSIS — N18.2 STAGE 2 CHRONIC KIDNEY DISEASE: ICD-10-CM

## 2025-05-01 DIAGNOSIS — Z85.3 HISTORY OF BREAST CANCER: ICD-10-CM

## 2025-05-01 DIAGNOSIS — Z79.811 LONG TERM (CURRENT) USE OF AROMATASE INHIBITORS: ICD-10-CM

## 2025-05-01 DIAGNOSIS — M85.851 OSTEOPENIA OF NECK OF RIGHT FEMUR: Primary | ICD-10-CM

## 2025-05-01 DIAGNOSIS — E87.6 HYPOKALEMIA: ICD-10-CM

## 2025-05-01 DIAGNOSIS — M85.851 OSTEOPENIA OF NECK OF RIGHT FEMUR: ICD-10-CM

## 2025-05-01 DIAGNOSIS — N18.31 STAGE 3A CHRONIC KIDNEY DISEASE: ICD-10-CM

## 2025-05-01 DIAGNOSIS — Z17.0 MALIGNANT NEOPLASM OF UPPER-INNER QUADRANT OF RIGHT BREAST IN FEMALE, ESTROGEN RECEPTOR POSITIVE: ICD-10-CM

## 2025-05-01 LAB
ALBUMIN SERPL-MCNC: 4.3 G/DL (ref 3.5–5.2)
ALBUMIN/GLOB SERPL: 1.3 G/DL
ALP SERPL-CCNC: 93 U/L (ref 39–117)
ALT SERPL W P-5'-P-CCNC: 8 U/L (ref 1–33)
ANION GAP SERPL CALCULATED.3IONS-SCNC: 13 MMOL/L (ref 5–15)
AST SERPL-CCNC: 14 U/L (ref 1–32)
BASOPHILS # BLD AUTO: 0.03 10*3/MM3 (ref 0–0.2)
BASOPHILS NFR BLD AUTO: 0.5 % (ref 0–1.5)
BILIRUB SERPL-MCNC: 1 MG/DL (ref 0–1.2)
BUN SERPL-MCNC: 12 MG/DL (ref 8–23)
BUN/CREAT SERPL: 12 (ref 7–25)
CALCIUM SPEC-SCNC: 10.5 MG/DL (ref 8.6–10.5)
CEA SERPL-MCNC: 2.2 NG/ML
CHLORIDE SERPL-SCNC: 97 MMOL/L (ref 98–107)
CO2 SERPL-SCNC: 28 MMOL/L (ref 22–29)
CREAT SERPL-MCNC: 1 MG/DL (ref 0.57–1)
DEPRECATED RDW RBC AUTO: 44 FL (ref 37–54)
EGFRCR SERPLBLD CKD-EPI 2021: 57.8 ML/MIN/1.73
EOSINOPHIL # BLD AUTO: 0.16 10*3/MM3 (ref 0–0.4)
EOSINOPHIL NFR BLD AUTO: 2.6 % (ref 0.3–6.2)
ERYTHROCYTE [DISTWIDTH] IN BLOOD BY AUTOMATED COUNT: 13.2 % (ref 12.3–15.4)
GLOBULIN UR ELPH-MCNC: 3.3 GM/DL
GLUCOSE SERPL-MCNC: 115 MG/DL (ref 65–99)
HCT VFR BLD AUTO: 39.1 % (ref 34–46.6)
HGB BLD-MCNC: 13 G/DL (ref 12–15.9)
HOLD SPECIMEN: NORMAL
IMM GRANULOCYTES # BLD AUTO: 0.03 10*3/MM3 (ref 0–0.05)
IMM GRANULOCYTES NFR BLD AUTO: 0.5 % (ref 0–0.5)
LYMPHOCYTES # BLD AUTO: 1.81 10*3/MM3 (ref 0.7–3.1)
LYMPHOCYTES NFR BLD AUTO: 29 % (ref 19.6–45.3)
MAGNESIUM SERPL-MCNC: 1.6 MG/DL (ref 1.6–2.4)
MCH RBC QN AUTO: 30.4 PG (ref 26.6–33)
MCHC RBC AUTO-ENTMCNC: 33.2 G/DL (ref 31.5–35.7)
MCV RBC AUTO: 91.4 FL (ref 79–97)
MONOCYTES # BLD AUTO: 0.47 10*3/MM3 (ref 0.1–0.9)
MONOCYTES NFR BLD AUTO: 7.5 % (ref 5–12)
NEUTROPHILS NFR BLD AUTO: 3.74 10*3/MM3 (ref 1.7–7)
NEUTROPHILS NFR BLD AUTO: 59.9 % (ref 42.7–76)
NRBC BLD AUTO-RTO: 0 /100 WBC (ref 0–0.2)
PHOSPHATE SERPL-MCNC: 3.7 MG/DL (ref 2.5–4.5)
PLATELET # BLD AUTO: 248 10*3/MM3 (ref 140–450)
PMV BLD AUTO: 9 FL (ref 6–12)
POTASSIUM SERPL-SCNC: 3.5 MMOL/L (ref 3.5–5.2)
PROT SERPL-MCNC: 7.6 G/DL (ref 6–8.5)
RBC # BLD AUTO: 4.28 10*6/MM3 (ref 3.77–5.28)
SODIUM SERPL-SCNC: 138 MMOL/L (ref 136–145)
WBC NRBC COR # BLD AUTO: 6.24 10*3/MM3 (ref 3.4–10.8)

## 2025-05-01 PROCEDURE — 80053 COMPREHEN METABOLIC PANEL: CPT

## 2025-05-01 PROCEDURE — 86300 IMMUNOASSAY TUMOR CA 15-3: CPT

## 2025-05-01 PROCEDURE — 82378 CARCINOEMBRYONIC ANTIGEN: CPT

## 2025-05-01 PROCEDURE — 85025 COMPLETE CBC W/AUTO DIFF WBC: CPT

## 2025-05-01 PROCEDURE — 36415 COLL VENOUS BLD VENIPUNCTURE: CPT

## 2025-05-01 PROCEDURE — 84100 ASSAY OF PHOSPHORUS: CPT

## 2025-05-01 PROCEDURE — 83735 ASSAY OF MAGNESIUM: CPT

## 2025-05-01 NOTE — PROGRESS NOTES
[facility]  [address]  [place] [address] [phone]    Patient Name: Cee Quiles  Encounter Date: 05/01/2025  YOB: 1945  Patient Number: [id number]      REASON FOR FOLLOW-UP: Cee Quiles is a pleasant 80 y.o.  female who is seen on followup for Stage IA right breast cancer, receptor positive, and HER-2/jeremy negative.  Low recurrence score of 6, absolute benefit of chemo is less than 1%.   She is on adjuvant letrozole.  She is also seen for anemia from iron deficiency and chronic kidney disease.      Pt had colonoscopy on 08/29/22.  Biopsy with benign colonic mucosa.  Histologic changes of an adenomatous polyp or hyperplastic polyp are not identified.  Adenomatous polyp, tubular type.  No high-grade dysplasia identified.     She has osteopenia and has been taking Femara which she is tolerating well.     INTERVAL HISTORY      History of Present Illness  The patient presents for follow up.4  No new health concerns since the last visit. Continues letrozole regimen with adequate supply, no refill needed. Prolia injections were previously administered, but the most recent dose was missed due to low potassium and confusion. Bone density test conducted in 2023; no further tests since. No longer under Dr. Medrano's care but continues nephrology follow-up with Suha Storm.      Oncology/Hematology History Overview Note   DIAGNOSTIC ABNORMALITIES:  The patient was found to have a right breast mass by mammogram at [facility].   Right breast core biopsy 01/17/2019 showed infiltrating lobular carcinoma, grade 1. Greatest dimension of tumor measured 11.1 mm. ER 98%, PA 92%, HER-2/jeremy +1 and negative FISH, and Ki-67 at 7%,. Oncotype DX report. Low recurrence score of 6, absolute benefit of chemo is less than 1%  Breast MRI [facility] 01/30/2019, 1.8 x 0.7 x 0.6 cm, non  mass-like linear enhancement at 1 o'clock. Second suspicious area of enhancement in the right breast just behind the nipple measuring 1 x 1 x 0.9 cm. Two areas of linear non mass-like enhancement has anterior depth at 1 o'clock in the left breast.   The patient was seen by Dr. Hodges 02/01/2019. Planned for bilateral mastectomy and sentinel lymph node evaluation.   Ultrasound biopsy left breast showed atypical lobular hyperplasia. No histologic evidence of malignancy.   The patient had a followup with Dr. Hogdes 02/13/2019.  CBC 02/13/2019 revealed a WBC of 10.6, hemoglobin 12.2, hematocrit 36.8, MCV 86.4, platelet count 211,000 with ANC of 9.4. CMP remarkable for a GFR of 46 mL/minute.  Pathology report 02/15/2019 left breast showed no evidence of in situ or invasive carcinoma. Right breast showed an infiltrating lobular carcinoma, grade 1 with residual 8 mm.       PREVIOUS INTERVENTIONS:   Bilateral mastectomy with right breast sentinel lymph node biopsy on 02/12/2019.  Adjuvant Femara 2.5 mg p.o. daily 03/22/2019 through present.      PREVIOUS INTERVENTIONS:  Ferrous sulfate 325 mg daily 04/22/2020 through 05/20/2020.  Resume 07/17/2020 through 08/20/2020.     Breast CA   2/12/2019 Initial Diagnosis    Breast CA (CMS/HCC)     Malignant neoplasm of upper-inner quadrant of right breast in female, estrogen receptor positive   4/23/2020 Initial Diagnosis    Malignant neoplasm of upper-inner quadrant of right breast in female, estrogen receptor positive (CMS/HCC)     4/24/2020 -  Chemotherapy    OP SUPPORTIVE Denosumab (Prolia) Q6M         PAST MEDICAL HISTORY:  ALLERGIES:  Allergies   Allergen Reactions    Keflex [Cephalexin] Anaphylaxis    Lortab [Hydrocodone-Acetaminophen] Nausea Only     CURRENT MEDICATIONS:  Outpatient Encounter Medications as of 5/1/2025   Medication Sig Dispense Refill    amLODIPine (NORVASC) 5 MG tablet Take 1 tablet by mouth Daily.      atorvastatin (LIPITOR) 40 MG tablet Take 1 tablet by  mouth Daily. 90 tablet 3    calcitriol (ROCALTROL) 0.25 MCG capsule Take 1 capsule by mouth Every Other Day.      calcium citrate-vitamin d (CALCITRATE) 315-250 MG-UNIT tablet tablet Take  by mouth Daily.      Denosumab (PROLIA SC) Inject  under the skin into the appropriate area as directed.      furosemide (LASIX) 20 MG tablet Take 1 tablet by mouth Daily.      letrozole (FEMARA) 2.5 MG tablet TAKE 1 TABLET BY MOUTH DAILY 90 tablet 3    Magnesium Oxide -Mg Supplement 400 MG capsule Take 1 capsule by mouth Every 12 (Twelve) Hours.      meclizine (ANTIVERT) 25 MG tablet Take 1 tablet by mouth 3 (Three) Times a Day As Needed for Dizziness.      potassium chloride (KLOR-CON M20) 20 MEQ CR tablet Take 1 tablet by mouth Daily. 90 tablet 1    [DISCONTINUED] levothyroxine (SYNTHROID, LEVOTHROID) 100 MCG tablet TAKE 1 TABLET BY MOUTH DAILY 90 tablet 3    [DISCONTINUED] nebivolol (BYSTOLIC) 20 MG tablet TAKE 1 TABLET BY MOUTH DAILY 90 tablet 3     No facility-administered encounter medications on file as of 5/1/2025.     ADULT ILLNESSES:  Patient Active Problem List   Diagnosis Code    Essential hypertension I10    Acquired hypothyroidism E03.9    Chronic pain of right knee M25.561, G89.29    CKD (chronic kidney disease) stage 3, GFR 30-59 ml/min N18.30    Mixed hyperlipidemia E78.2    Morbidly obese E66.01    Breast CA C50.919    Postmenopausal Z78.0    Hypomagnesemia E83.42    Osteopenia of neck of femur M85.859    Malignant neoplasm of upper-inner quadrant of right breast in female, estrogen receptor positive C50.211, Z17.0    Long term (current) use of aromatase inhibitors Z79.811    Hypokalemia E87.6    Rash R21     SURGERIES:  Past Surgical History:   Procedure Laterality Date    CHOLECYSTECTOMY      COLONOSCOPY N/A 8/29/2022    Procedure: COLONOSCOPY WITH ANESTHESIA;  Surgeon: Rico Hilton MD;  Location: [facility] ENDOSCOPY;  Service: Gastroenterology;  Laterality: N/A;  pre hx colon polyp  post polyp  dr ojeda  pankaj    COLONOSCOPY W/ POLYPECTOMY  02/08/2012    Pedunculated polyp cecal pit, Hyperplastic polyp at 15 cm repeat exam in 5 years    COLONOSCOPY W/ POLYPECTOMY  07/31/2017    Tubular adenoma hepatic flexure repeat exam in 5 years    KNEE SURGERY Right     MASTECTOMY W/ SENTINEL NODE BIOPSY Bilateral 02/12/2019    Procedure: BILATERAL MASTECTOMY WITH RIGHT BREAST SENTINEL LYMPH NODE BIOPSY - RADIOLOGIST WILL INJECT;  Surgeon: Michael Hodges MD;  Location[facility] OR;  Service: General    OVARIAN CYST DRAINAGE Left     TUMOR REMOVAL Left      HEALTH MAINTENANCE ITEMS:  Health Maintenance Due   Topic Date Due    TDAP/TD VACCINES (1 - Tdap) Never done    ZOSTER VACCINE (1 of 2) Never done    Pneumococcal Vaccine 50+ (2 of 2 - PPSV23) 01/13/2020    RSV Vaccine - Adults (1 - 1-dose 75+ series) Never done    DIABETIC FOOT EXAM  09/08/2023    DIABETIC EYE EXAM  07/13/2024    URINE MICROALBUMIN-CREATININE RATIO (uACR)  03/14/2025    COVID-19 Vaccine (4 - 2024-25 season) 04/03/2025    HEMOGLOBIN A1C  06/16/2025       <no information>  Last Completed Colonoscopy            Needs Review       COLORECTAL CANCER SCREENING (COLONOSCOPY - Every 5 Years) Tentatively due on 8/29/2027 08/29/2022  Surgical Procedure: COLONOSCOPY    08/29/2022  COLONOSCOPY    04/24/2020  Occult Blood X 3, Stool - Stool, Per Rectum    08/14/2017  SCANNED - COLONOSCOPY    07/31/2017  Outside Claim: [place] COLSC FLX W/RMVL OF TUMOR POLYP LESION SNARE TQ     Only the first 5 history entries have been loaded, but more history exists.                        Immunization History   Administered Date(s) Administered    COVID-19 (MODERNA) 1st,2nd,3rd Dose Monovalent 02/23/2021, 03/22/2021    COVID-19 (PFIZER) 12YRS+ (COMIRNATY) 10/03/2024    FLUAD TRI 65YR+ 11/18/2019    Flu Vaccine Quad PF >36MO 11/13/2017    Fluad Quad 65+ 11/18/2019, 09/27/2022    Fluzone  >6mos 11/13/2017    Fluzone High-Dose 65+YRS 10/23/2015, 10/31/2016, 10/31/2018    Fluzone  High-Dose 65+yrs 11/13/2023    Fluzone Quad >6mos (Multi-dose) 11/13/2017    Pneumococcal Conjugate 13-Valent (PCV13) 11/18/2019     Last Completed Mammogram            Completed or No Longer Recommended       MAMMOGRAM  Discontinued        Frequency changed to Never automatically (Topic No Longer Applies)    12/21/2018  Mammo diagnostic digital tomosynthesis right w CAD    12/17/2018  Mammo Screening Bilateral With CAD    12/16/2016  SCANNED - MAMMO    12/09/2015  Outside Claim: HC MAMMOGRAM SCREENING BILAT DIGITAL      Only the first 5 history entries have been loaded, but more history exists.                              FAMILY HISTORY:  Family History   Problem Relation Age of Onset    Colon cancer Neg Hx     Colon polyps Neg Hx      SOCIAL HISTORY:  Social History     Socioeconomic History    Marital status:    Tobacco Use    Smoking status: Never    Smokeless tobacco: Never   Vaping Use    Vaping status: Never Used   Substance and Sexual Activity    Alcohol use: No    Drug use: No    Sexual activity: Defer       REVIEW OF SYSTEMS:    Review of Systems   Constitutional:  Negative for chills, fatigue and fever.   HENT:  Negative for congestion, mouth sores and trouble swallowing.    Eyes:  Negative for redness and visual disturbance.   Respiratory:  Negative for cough and shortness of breath.    Cardiovascular:  Negative for chest pain and palpitations.   Gastrointestinal:  Negative for abdominal pain, nausea and vomiting.   Endocrine: Negative for polydipsia and polyphagia.   Genitourinary:  Negative for difficulty urinating, dysuria and flank pain.   Musculoskeletal:  Negative for gait problem and joint swelling.   Skin:  Negative for pallor.   Allergic/Immunologic: Negative for food allergies.   Neurological:  Negative for dizziness, speech difficulty and weakness.   Hematological:  Negative for adenopathy. Does not bruise/bleed easily.   Psychiatric/Behavioral:  Negative for agitation, confusion and  "hallucinations.        VITAL SIGNS: /78   Pulse 76   Temp 97 °F (36.1 °C)   Resp 16   Ht 154.9 cm (61\")   Wt 76.8 kg (169 lb 6.4 oz)   SpO2 96%   BMI 32.01 kg/m²   Pain Score    05/01/25 1027   PainSc: 0-No pain         PHYSICAL EXAMINATION:     Physical Exam  Vitals reviewed.   Constitutional:       General: She is not in acute distress.  HENT:      Head: Normocephalic and atraumatic.   Eyes:      General: No scleral icterus.  Cardiovascular:      Rate and Rhythm: Normal rate.   Pulmonary:      Effort: No respiratory distress.      Breath sounds: No wheezing or rales.   Chest:      Comments: Bilateral mastectomy scars.   No palpable evidence of reoccurrence    Abdominal:      General: Bowel sounds are normal.      Palpations: Abdomen is soft.   Musculoskeletal:      Cervical back: Neck supple.      Right lower leg: Edema present.      Left lower leg: Edema present.      Comments: Using cane for ambulation    Lymphadenopathy:      Upper Body:      Right upper body: No axillary adenopathy.      Left upper body: No axillary adenopathy.   Skin:     General: Skin is warm and dry.      Coloration: Skin is not pale.   Neurological:      Mental Status: She is alert and oriented to person, place, and time.   Psychiatric:         Mood and Affect: Mood normal.         Behavior: Behavior normal.         LABS    Lab Results - Last 18 Months   Lab Units 05/01/25  1012 08/22/24  1109 07/18/24  0931 03/14/24  0937 02/22/24  1031   HEMOGLOBIN g/dL 13.0 12.9 13.5 13.1 13.7   HEMATOCRIT % 39.1 38.9 41.7 39.5 41.1   MCV fL 91.4 90.0 91.2 92.5 91.7   WBC 10*3/mm3 6.24 5.53 8.2 7.10 6.26   RDW % 13.2 13.4 13.5 12.7 12.4   MPV fL 9.0 8.8 8.9*  --  9.2   PLATELETS 10*3/mm3 248 242 252 269 286   IMM GRAN % % 0.5 0.4  --   --  0.2   NEUTROS ABS 10*3/mm3 3.74 3.61 4.8 4.24 3.50   LYMPHS ABS 10*3/mm3 1.81 1.39 2.6 2.19 2.24   MONOS ABS 10*3/mm3 0.47 0.40 0.60 0.50 0.40   EOS ABS 10*3/mm3 0.16 0.08 0.20 0.11 0.08   BASOS ABS " 10*3/mm3 0.03 0.03 0.00 0.03 0.03   IMMATURE GRANS (ABS) 10*3/mm3 0.03 0.02 0.0  --  0.01   NRBC /100 WBC 0.0 0.0  --  0.0 0.0       Lab Results - Last 18 Months   Lab Units 05/01/25  1012 03/19/25  0804 12/18/24  0859 11/01/24  1206 10/30/24  1330 08/22/24  1109 07/18/24  0931 05/15/24  0822 04/30/24  1241 03/14/24  0937 02/22/24  1031   GLUCOSE mg/dL 115* 135* 134*  --  127* 97 103 103* 110*   < > 110*   SODIUM mmol/L 138 139 138  --  139 138 137 141 141   < > 139   POTASSIUM mmol/L 3.5 3.5 3.0* 3.6 2.9* 3.4* 3.5 3.8 3.0*   < > 4.4   TOTAL CO2 mmol/L  --   --   --   --   --   --  27  --   --   --   --    CO2 mmol/L 28.0 27.6 30.5*  --  29.0 26.0  --  24.7 29.0   < > 27.0   CHLORIDE mmol/L 97* 98 96*  --  96* 97* 97* 104 98   < > 98   ANION GAP mmol/L 13.0  --   --   --  14.0 15.0 13  --  14.0  --  14.0   CREATININE mg/dL 1.00 1.14* 1.35*  --  1.07* 0.94 0.9 0.90 1.11*   < > 1.09*   BUN mg/dL 12 17 18  --  17 21 18 12 16   < > 19   BUN / CREAT RATIO  12.0 14.9 13.3  --  15.9 22.3  --  13.3 14.4   < > 17.4   CALCIUM mg/dL 10.5 10.7* 10.9*  --  10.5 9.9 9.3 9.3 10.8*   < > 10.2   ALK PHOS U/L 93 83 77  --  67 56 69  --  66   < > 62   TOTAL PROTEIN g/dL 7.6 7.4 7.0  --  6.9 7.2 7.4  --  7.3   < > 7.9   ALT (SGPT) U/L 8 9 8  --  9 8 10  --  11   < > 12   AST (SGOT) U/L 14 18 16  --  15 14 14  --  15   < > 17   BILIRUBIN mg/dL 1.0 0.9 0.9  --  1.0 1.1 0.8  --  0.9   < > 1.0   ALBUMIN g/dL 4.3 4.3 4.3  --  4.0 4.3 4.3  --  4.3   < > 4.5   GLOBULIN gm/dL 3.3  --   --   --  2.9 2.9  --   --  3.0  --  3.4   GLOBULINREF gm/dL  --  3.1 2.7  --   --   --   --   --   --    < >  --     < > = values in this interval not displayed.       Lab Results - Last 18 Months   Lab Units 05/01/25  1012 08/22/24  1109 07/18/24  0931 02/22/24  1031   URIC ACID mg/dL  --   --  6.0*  --    CEA ng/mL 2.20 1.81  --  2.01       Lab Results - Last 18 Months   Lab Units 03/19/25  0804 03/14/24  0937   TSH uIU/mL 1.060 1.180         Cee Quiles  reports a pain score of 0.  No intervention indicated.       ASSESSMENT:  1. Osteopenia of neck of right femur    2. History of breast cancer    3. Long term (current) use of aromatase inhibitors    4. Stage 3a chronic kidney disease        1.   History of Carcinoma of the right breast, infiltrating lobular.    - Low recurrence score of 6, absolute benefit of chemo is less than 1%:  Current Stage: AJCC Stage IA (T1c N0, M0, G1)   Tumor size 1.91 cm.  Hormone Status: ER 98%, GA 92%, HER-2/jeremy negative by FISH.  Baseline Node Status: 0/2 positive.  Treatment status: Post bilateral mastectomy and right sentinel node biopsy.  On adjuvant Femara 2.5 mg 03/22/2019 through present.  Plan for 10 years.   -Labs:  Ca 27.29:  30, CEA :  2.2  -Continue follow up       2.  Chronic kidney disease Stage IIIa  -Labs:  BUN 12, Creatinine 1.0, GFR 57.8   -Continue follow up with [facility]    3.   Osteopenia   4. Long term use of AI  - Taking Femara 2.5 mg 03/22/2019 through present.  Plan for 10 years.   -No s/s of toxicity.  Monitor for hot flashes,osteonecrosis  -On denosumab (Prolia) q 6 months.   -Last prolia  April 2024.    -Will order DEXA Scan and then resume Prolia.  Will have to check Calcium, Mag, Phos prior     -DEXA April 5, 2023: Femoral neck T Score -1.1, Lumbar Spine T Score 2.1        PLAN:  Stable for observation  Schedule denosumab 60 mg subcutaneously every 6 months  -Bone density improved and still on Femara.  Monitor for osteonecrosis of the jaw.  -Continue ongoing management per primary care physician and other specialists.  Return to office in 6 months with labs one week before appt with CBC with differential, CEA, CA27-29, and CMP, Mag, Phos  Plan of care discussed with patient.  Understanding expressed.  Patient agreeable to proceed.      Advance Care Planning   ACP discussion was declined by the patient. Patient does not have an advance directive, information provided.    Shirley Milner,  APRN  05/01/2025

## 2025-05-02 LAB — CANCER AG27-29 SERPL-ACNC: 30 U/ML (ref 0–38.6)

## 2025-05-12 RX ORDER — LEVOTHYROXINE SODIUM 100 UG/1
100 TABLET ORAL DAILY
Qty: 90 TABLET | Refills: 3 | Status: SHIPPED | OUTPATIENT
Start: 2025-05-12

## 2025-05-12 RX ORDER — NEBIVOLOL 20 MG/1
20 TABLET ORAL DAILY
Qty: 90 TABLET | Refills: 3 | Status: SHIPPED | OUTPATIENT
Start: 2025-05-12

## 2025-05-13 ENCOUNTER — RESULTS FOLLOW-UP (OUTPATIENT)
Dept: ONCOLOGY | Facility: CLINIC | Age: 80
End: 2025-05-13
Payer: MEDICARE

## 2025-05-13 ENCOUNTER — HOSPITAL ENCOUNTER (OUTPATIENT)
Dept: BONE DENSITY | Facility: HOSPITAL | Age: 80
Discharge: HOME | End: 2025-05-13
Admitting: NURSE PRACTITIONER
Payer: MEDICARE

## 2025-05-13 DIAGNOSIS — Z79.811 LONG TERM (CURRENT) USE OF AROMATASE INHIBITORS: ICD-10-CM

## 2025-05-13 DIAGNOSIS — M85.851 OSTEOPENIA OF NECK OF RIGHT FEMUR: ICD-10-CM

## 2025-05-13 PROCEDURE — 77080 DXA BONE DENSITY AXIAL: CPT

## 2025-05-29 ENCOUNTER — INFUSION (OUTPATIENT)
Dept: ONCOLOGY | Facility: HOSPITAL | Age: 80
End: 2025-05-29
Payer: MEDICARE

## 2025-05-29 ENCOUNTER — LAB (OUTPATIENT)
Dept: LAB | Facility: HOSPITAL | Age: 80
End: 2025-05-29
Payer: MEDICARE

## 2025-05-29 VITALS
BODY MASS INDEX: 32.1 KG/M2 | HEART RATE: 61 BPM | OXYGEN SATURATION: 97 % | SYSTOLIC BLOOD PRESSURE: 152 MMHG | HEIGHT: 61 IN | RESPIRATION RATE: 16 BRPM | WEIGHT: 170 LBS | TEMPERATURE: 97.4 F | DIASTOLIC BLOOD PRESSURE: 76 MMHG

## 2025-05-29 DIAGNOSIS — M85.851 OSTEOPENIA OF NECK OF RIGHT FEMUR: ICD-10-CM

## 2025-05-29 DIAGNOSIS — Z85.3 HISTORY OF BREAST CANCER: ICD-10-CM

## 2025-05-29 DIAGNOSIS — E83.42 HYPOMAGNESEMIA: Primary | ICD-10-CM

## 2025-05-29 LAB
ALBUMIN SERPL-MCNC: 4 G/DL (ref 3.5–5.2)
ALBUMIN/GLOB SERPL: 1.3 G/DL
ALP SERPL-CCNC: 93 U/L (ref 39–117)
ALT SERPL W P-5'-P-CCNC: 9 U/L (ref 1–33)
ANION GAP SERPL CALCULATED.3IONS-SCNC: 14 MMOL/L (ref 5–15)
AST SERPL-CCNC: 15 U/L (ref 1–32)
BILIRUB SERPL-MCNC: 0.8 MG/DL (ref 0–1.2)
BUN SERPL-MCNC: 14.6 MG/DL (ref 8–23)
BUN/CREAT SERPL: 13.4 (ref 7–25)
CALCIUM SPEC-SCNC: 10.2 MG/DL (ref 8.6–10.5)
CHLORIDE SERPL-SCNC: 99 MMOL/L (ref 98–107)
CO2 SERPL-SCNC: 25 MMOL/L (ref 22–29)
CREAT SERPL-MCNC: 1.09 MG/DL (ref 0.57–1)
EGFRCR SERPLBLD CKD-EPI 2021: 52.1 ML/MIN/1.73
GLOBULIN UR ELPH-MCNC: 3.1 GM/DL
GLUCOSE SERPL-MCNC: 110 MG/DL (ref 65–99)
MAGNESIUM SERPL-MCNC: 1.5 MG/DL (ref 1.6–2.4)
PHOSPHATE SERPL-MCNC: 3.8 MG/DL (ref 2.5–4.5)
POTASSIUM SERPL-SCNC: 4 MMOL/L (ref 3.5–5.2)
PROT SERPL-MCNC: 7.1 G/DL (ref 6–8.5)
SODIUM SERPL-SCNC: 138 MMOL/L (ref 136–145)

## 2025-05-29 PROCEDURE — 25010000002 MAGNESIUM SULFATE 2 GM/50ML SOLUTION: Performed by: NURSE PRACTITIONER

## 2025-05-29 PROCEDURE — 25810000003 SODIUM CHLORIDE 0.9 % SOLUTION: Performed by: NURSE PRACTITIONER

## 2025-05-29 PROCEDURE — 80053 COMPREHEN METABOLIC PANEL: CPT

## 2025-05-29 PROCEDURE — 96366 THER/PROPH/DIAG IV INF ADDON: CPT

## 2025-05-29 PROCEDURE — 36415 COLL VENOUS BLD VENIPUNCTURE: CPT

## 2025-05-29 PROCEDURE — 96365 THER/PROPH/DIAG IV INF INIT: CPT

## 2025-05-29 PROCEDURE — 84100 ASSAY OF PHOSPHORUS: CPT

## 2025-05-29 PROCEDURE — 83735 ASSAY OF MAGNESIUM: CPT

## 2025-05-29 RX ORDER — SODIUM CHLORIDE 9 MG/ML
20 INJECTION, SOLUTION INTRAVENOUS ONCE
Status: COMPLETED | OUTPATIENT
Start: 2025-05-29 | End: 2025-05-29

## 2025-05-29 RX ORDER — MAGNESIUM SULFATE HEPTAHYDRATE 40 MG/ML
2 INJECTION, SOLUTION INTRAVENOUS ONCE
OUTPATIENT
Start: 2025-05-29

## 2025-05-29 RX ORDER — SODIUM CHLORIDE 9 MG/ML
20 INJECTION, SOLUTION INTRAVENOUS ONCE
Status: CANCELLED | OUTPATIENT
Start: 2025-05-29

## 2025-05-29 RX ORDER — MAGNESIUM SULFATE HEPTAHYDRATE 40 MG/ML
2 INJECTION, SOLUTION INTRAVENOUS ONCE
Status: COMPLETED | OUTPATIENT
Start: 2025-05-29 | End: 2025-05-29

## 2025-05-29 RX ORDER — SODIUM CHLORIDE 9 MG/ML
20 INJECTION, SOLUTION INTRAVENOUS ONCE
OUTPATIENT
Start: 2025-05-29

## 2025-05-29 RX ORDER — MAGNESIUM SULFATE HEPTAHYDRATE 40 MG/ML
2 INJECTION, SOLUTION INTRAVENOUS ONCE
Status: CANCELLED | OUTPATIENT
Start: 2025-05-29

## 2025-05-29 RX ADMIN — MAGNESIUM SULFATE HEPTAHYDRATE 2 G: 2 INJECTION, SOLUTION INTRAVENOUS at 14:40

## 2025-05-29 RX ADMIN — SODIUM CHLORIDE 20 ML/HR: 9 INJECTION, SOLUTION INTRAVENOUS at 14:40

## 2025-05-29 NOTE — PROGRESS NOTES
Per Shirley CORDON pt to receive 2g IV magnesium today recheck labs next week for poss prolia injection.

## 2025-05-30 DIAGNOSIS — M85.851 OSTEOPENIA OF NECK OF RIGHT FEMUR: Primary | ICD-10-CM

## 2025-06-05 ENCOUNTER — LAB (OUTPATIENT)
Dept: LAB | Facility: HOSPITAL | Age: 80
End: 2025-06-05
Payer: MEDICARE

## 2025-06-05 ENCOUNTER — INFUSION (OUTPATIENT)
Dept: ONCOLOGY | Facility: HOSPITAL | Age: 80
End: 2025-06-05
Payer: MEDICARE

## 2025-06-05 VITALS
HEART RATE: 70 BPM | TEMPERATURE: 97.2 F | DIASTOLIC BLOOD PRESSURE: 70 MMHG | SYSTOLIC BLOOD PRESSURE: 144 MMHG | RESPIRATION RATE: 18 BRPM | OXYGEN SATURATION: 95 %

## 2025-06-05 DIAGNOSIS — M85.851 OSTEOPENIA OF NECK OF RIGHT FEMUR: Primary | ICD-10-CM

## 2025-06-05 DIAGNOSIS — Z79.811 LONG TERM (CURRENT) USE OF AROMATASE INHIBITORS: ICD-10-CM

## 2025-06-05 DIAGNOSIS — C50.211 MALIGNANT NEOPLASM OF UPPER-INNER QUADRANT OF RIGHT BREAST IN FEMALE, ESTROGEN RECEPTOR POSITIVE: ICD-10-CM

## 2025-06-05 DIAGNOSIS — M85.851 OSTEOPENIA OF NECK OF RIGHT FEMUR: ICD-10-CM

## 2025-06-05 DIAGNOSIS — Z17.0 MALIGNANT NEOPLASM OF UPPER-INNER QUADRANT OF RIGHT BREAST IN FEMALE, ESTROGEN RECEPTOR POSITIVE: ICD-10-CM

## 2025-06-05 LAB
ALBUMIN SERPL-MCNC: 4.2 G/DL (ref 3.5–5.2)
ALBUMIN/GLOB SERPL: 1.4 G/DL
ALP SERPL-CCNC: 91 U/L (ref 39–117)
ALT SERPL W P-5'-P-CCNC: 8 U/L (ref 1–33)
ANION GAP SERPL CALCULATED.3IONS-SCNC: 12 MMOL/L (ref 5–15)
AST SERPL-CCNC: 14 U/L (ref 1–32)
BILIRUB SERPL-MCNC: 1.1 MG/DL (ref 0–1.2)
BUN SERPL-MCNC: 16.9 MG/DL (ref 8–23)
BUN/CREAT SERPL: 15.9 (ref 7–25)
CALCIUM SPEC-SCNC: 10.4 MG/DL (ref 8.6–10.5)
CHLORIDE SERPL-SCNC: 101 MMOL/L (ref 98–107)
CO2 SERPL-SCNC: 26 MMOL/L (ref 22–29)
CREAT SERPL-MCNC: 1.06 MG/DL (ref 0.57–1)
EGFRCR SERPLBLD CKD-EPI 2021: 53.9 ML/MIN/1.73
GLOBULIN UR ELPH-MCNC: 2.9 GM/DL
GLUCOSE SERPL-MCNC: 104 MG/DL (ref 65–99)
MAGNESIUM SERPL-MCNC: 1.7 MG/DL (ref 1.6–2.4)
PHOSPHATE SERPL-MCNC: 4 MG/DL (ref 2.5–4.5)
POTASSIUM SERPL-SCNC: 4 MMOL/L (ref 3.5–5.2)
PROT SERPL-MCNC: 7.1 G/DL (ref 6–8.5)
SODIUM SERPL-SCNC: 139 MMOL/L (ref 136–145)

## 2025-06-05 PROCEDURE — 84100 ASSAY OF PHOSPHORUS: CPT

## 2025-06-05 PROCEDURE — 80053 COMPREHEN METABOLIC PANEL: CPT

## 2025-06-05 PROCEDURE — 83735 ASSAY OF MAGNESIUM: CPT

## 2025-06-05 PROCEDURE — 36415 COLL VENOUS BLD VENIPUNCTURE: CPT

## 2025-06-05 PROCEDURE — 25010000002 DENOSUMAB 60 MG/ML SOLUTION PREFILLED SYRINGE: Performed by: NURSE PRACTITIONER

## 2025-06-05 PROCEDURE — 96372 THER/PROPH/DIAG INJ SC/IM: CPT

## 2025-06-05 RX ADMIN — DENOSUMAB 60 MG: 60 INJECTION SUBCUTANEOUS at 14:09

## 2025-06-06 RX ORDER — LETROZOLE 2.5 MG/1
2.5 TABLET, FILM COATED ORAL DAILY
Qty: 90 TABLET | Refills: 3 | Status: SHIPPED | OUTPATIENT
Start: 2025-06-06

## 2025-08-06 RX ORDER — POTASSIUM CHLORIDE 1500 MG/1
20 TABLET, EXTENDED RELEASE ORAL DAILY
Qty: 90 TABLET | Refills: 3 | Status: SHIPPED | OUTPATIENT
Start: 2025-08-06

## (undated) DEVICE — PK TURNOVER RM ADV

## (undated) DEVICE — TOWEL,OR,DSP,ST,BLUE,DLX,10/PK,8PK/CS: Brand: MEDLINE

## (undated) DEVICE — MASK,OXYGEN,MED CONC,ADLT,7' TUB, UC: Brand: PENDING

## (undated) DEVICE — ADHS LIQ MASTISOL 2/3ML

## (undated) DEVICE — SUT SILK 3/0 SH CR8 18IN C013D

## (undated) DEVICE — GLV SURG SENSICARE SLT PF LF 9 STRL

## (undated) DEVICE — 3M™ WARMING BLANKET, LOWER BODY, 10 PER CASE, 42568: Brand: BAIR HUGGER™

## (undated) DEVICE — SUT SILK 2/0 SH CR8 18IN CR8 C012D

## (undated) DEVICE — SUT SILK 2/0 SUTUPAK TIES 24IN SA75H

## (undated) DEVICE — GLV SURG TRIUMPH MICRO PF LTX 7.5 STRL

## (undated) DEVICE — DRSNG SURESITE WNDW 4X4.5

## (undated) DEVICE — SHEET, T, LAPAROTOMY, STERILE: Brand: MEDLINE

## (undated) DEVICE — CUFF,BP,DISP,1 TUBE,ADULT,HP: Brand: MEDLINE

## (undated) DEVICE — SUT VIC 3/0 RB1 27IN UD VCP215H

## (undated) DEVICE — SNAR POLYP SENSATION MICRO OVL 13 240X40

## (undated) DEVICE — SHEET,DRAPE,53X77,STERILE: Brand: MEDLINE

## (undated) DEVICE — TRY PREP SCRB VAG PVP

## (undated) DEVICE — DRSNG SURESITE123 4X10IN

## (undated) DEVICE — ELECTRD BLD EDGE/INSUL1P 2.4X5.1MM STRL

## (undated) DEVICE — SUT VIC 4/0 PC3 18IN UD VCP845G

## (undated) DEVICE — SENSR O2 OXIMAX FNGR A/ 18IN NONSTR

## (undated) DEVICE — DRN WND HUBLSS FLUT FULL PERF SIL10MM

## (undated) DEVICE — 3M™ STERI-STRIP™ REINFORCED ADHESIVE SKIN CLOSURES, R1546, 1/4 IN X 4 IN (6 MM X 100 MM), 10 STRIPS/ENVELOPE: Brand: 3M™ STERI-STRIP™

## (undated) DEVICE — SPNG GZ WOVN 4X4IN 12PLY 10/BX STRL

## (undated) DEVICE — THE SINGLE USE ETRAP – POLYP TRAP IS USED FOR SUCTION RETRIEVAL OF ENDOSCOPICALLY REMOVED POLYPS.: Brand: ETRAP

## (undated) DEVICE — TBG SMPL FLTR LINE NASL 02/C02 A/ BX/100

## (undated) DEVICE — THE CHANNEL CLEANING BRUSH IS A NYLON FLEXI BRUSH ATTACHED TO A FLEXIBLE PLASTIC SHEATH DESIGNED TO SAFELY REMOVE DEBRIS FROM FLEXIBLE ENDOSCOPES.

## (undated) DEVICE — SUT SILK 3/0 SUTUPAK TIES 24IN SA74H

## (undated) DEVICE — GAUZE,SPONGE,FLUFF,6"X6.75",STRL,10/TRAY: Brand: MEDLINE

## (undated) DEVICE — RESERVOIR,SUCTION,100CC,SILICONE: Brand: MEDLINE

## (undated) DEVICE — 3M™ IOBAN™ 2 ANTIMICROBIAL INCISE DRAPE 6650EZ: Brand: IOBAN™ 2

## (undated) DEVICE — YANKAUER,BULB TIP WITH VENT: Brand: ARGYLE

## (undated) DEVICE — Device: Brand: DEFENDO AIR/WATER/SUCTION AND BIOPSY VALVE

## (undated) DEVICE — SUT ETHLN 3/0 FS1 30IN 669H

## (undated) DEVICE — PAD MINOR UNIVERSAL: Brand: MEDLINE INDUSTRIES, INC.

## (undated) DEVICE — UTILITY MARKER W/MED LABELS: Brand: MEDLINE